# Patient Record
Sex: FEMALE | Race: WHITE | Employment: PART TIME | URBAN - NONMETROPOLITAN AREA
[De-identification: names, ages, dates, MRNs, and addresses within clinical notes are randomized per-mention and may not be internally consistent; named-entity substitution may affect disease eponyms.]

---

## 2019-09-04 ENCOUNTER — APPOINTMENT (OUTPATIENT)
Dept: CT IMAGING | Age: 54
DRG: 424 | End: 2019-09-04
Payer: MEDICAID

## 2019-09-04 ENCOUNTER — HOSPITAL ENCOUNTER (INPATIENT)
Age: 54
LOS: 7 days | Discharge: NURSING, REHAB OR LONG-TERM CARE | DRG: 424 | End: 2019-09-11
Attending: INTERNAL MEDICINE | Admitting: HOSPITALIST
Payer: MEDICAID

## 2019-09-04 ENCOUNTER — APPOINTMENT (OUTPATIENT)
Dept: GENERAL RADIOLOGY | Age: 54
DRG: 424 | End: 2019-09-04
Payer: MEDICAID

## 2019-09-04 DIAGNOSIS — I95.9 HYPOTENSION, UNSPECIFIED HYPOTENSION TYPE: ICD-10-CM

## 2019-09-04 DIAGNOSIS — E87.20 METABOLIC ACIDOSIS: Primary | ICD-10-CM

## 2019-09-04 PROBLEM — G93.41 ACUTE METABOLIC ENCEPHALOPATHY: Status: ACTIVE | Noted: 2019-09-04

## 2019-09-04 LAB
ALBUMIN SERPL-MCNC: 3.5 G/DL (ref 3.5–5.1)
ALLEN TEST: POSITIVE
ALP BLD-CCNC: 90 U/L (ref 38–126)
ALT SERPL-CCNC: 15 U/L (ref 11–66)
ANION GAP SERPL CALCULATED.3IONS-SCNC: 24 MEQ/L (ref 8–16)
AST SERPL-CCNC: 60 U/L (ref 5–40)
BACTERIA: ABNORMAL /HPF
BASE EXCESS (CALCULATED): -13.6 MMOL/L (ref -2.5–2.5)
BASOPHILS # BLD: 0.9 %
BASOPHILS ABSOLUTE: 0.1 THOU/MM3 (ref 0–0.1)
BETA-HYDROXYBUTYRATE: 47.27 MG/DL (ref 0.2–2.81)
BILIRUB SERPL-MCNC: 0.7 MG/DL (ref 0.3–1.2)
BILIRUBIN DIRECT: 0.3 MG/DL (ref 0–0.3)
BILIRUBIN URINE: ABNORMAL
BLOOD, URINE: NEGATIVE
BUN BLDV-MCNC: 26 MG/DL (ref 7–22)
CALCIUM SERPL-MCNC: 8.6 MG/DL (ref 8.5–10.5)
CASTS 2: ABNORMAL /LPF
CASTS UA: ABNORMAL /LPF
CHARACTER, URINE: CLEAR
CHLORIDE BLD-SCNC: 107 MEQ/L (ref 98–111)
CO2: 9 MEQ/L (ref 23–33)
COLLECTED BY:: ABNORMAL
COLOR: ABNORMAL
CREAT SERPL-MCNC: 1.5 MG/DL (ref 0.4–1.2)
CRYSTALS, UA: ABNORMAL
DEVICE: ABNORMAL
EKG ATRIAL RATE: 128 BPM
EKG P AXIS: 73 DEGREES
EKG P-R INTERVAL: 128 MS
EKG Q-T INTERVAL: 380 MS
EKG QRS DURATION: 64 MS
EKG QTC CALCULATION (BAZETT): 554 MS
EKG R AXIS: 89 DEGREES
EKG T AXIS: 79 DEGREES
EKG VENTRICULAR RATE: 128 BPM
EOSINOPHIL # BLD: 0.4 %
EOSINOPHILS ABSOLUTE: 0 THOU/MM3 (ref 0–0.4)
EPITHELIAL CELLS, UA: ABNORMAL /HPF
ERYTHROCYTE [DISTWIDTH] IN BLOOD BY AUTOMATED COUNT: 13.5 % (ref 11.5–14.5)
ERYTHROCYTE [DISTWIDTH] IN BLOOD BY AUTOMATED COUNT: 47.3 FL (ref 35–45)
GFR SERPL CREATININE-BSD FRML MDRD: 36 ML/MIN/1.73M2
GLUCOSE BLD-MCNC: 87 MG/DL (ref 70–108)
GLUCOSE BLD-MCNC: 90 MG/DL (ref 70–108)
GLUCOSE URINE: NEGATIVE MG/DL
HCO3: 11 MMOL/L (ref 23–28)
HCT VFR BLD CALC: 45 % (ref 37–47)
HEMOGLOBIN: 14.9 GM/DL (ref 12–16)
ICTOTEST: NEGATIVE
IMMATURE GRANS (ABS): 0.02 THOU/MM3 (ref 0–0.07)
IMMATURE GRANULOCYTES: 0 %
KETONES, URINE: 40
LACTIC ACID: 1.8 MMOL/L (ref 0.5–2.2)
LEUKOCYTE ESTERASE, URINE: NEGATIVE
LIPASE: 15 U/L (ref 5.6–51.3)
LYMPHOCYTES # BLD: 28.8 %
LYMPHOCYTES ABSOLUTE: 2.6 THOU/MM3 (ref 1–4.8)
MAGNESIUM: 1.8 MG/DL (ref 1.6–2.4)
MCH RBC QN AUTO: 31.2 PG (ref 26–33)
MCHC RBC AUTO-ENTMCNC: 33.1 GM/DL (ref 32.2–35.5)
MCV RBC AUTO: 94.3 FL (ref 81–99)
MISCELLANEOUS 2: ABNORMAL
MONOCYTES # BLD: 9.2 %
MONOCYTES ABSOLUTE: 0.8 THOU/MM3 (ref 0.4–1.3)
NITRITE, URINE: NEGATIVE
NUCLEATED RED BLOOD CELLS: 0 /100 WBC
O2 SATURATION: 98 %
OSMOLALITY CALCULATION: 283.7 MOSMOL/KG (ref 275–300)
PCO2: 21 MMHG (ref 35–45)
PH BLOOD GAS: 7.3 (ref 7.35–7.45)
PH UA: 6 (ref 5–9)
PLATELET # BLD: 376 THOU/MM3 (ref 130–400)
PMV BLD AUTO: 10.7 FL (ref 9.4–12.4)
PO2: 104 MMHG (ref 71–104)
POTASSIUM SERPL-SCNC: 5.2 MEQ/L (ref 3.5–5.2)
PREGNANCY, SERUM: NEGATIVE
PROTEIN UA: 30
RBC # BLD: 4.77 MILL/MM3 (ref 4.2–5.4)
RBC URINE: ABNORMAL /HPF
REASON FOR REJECTION: NORMAL
REJECTED TEST: NORMAL
RENAL EPITHELIAL, UA: ABNORMAL
SEG NEUTROPHILS: 60.5 %
SEGMENTED NEUTROPHILS ABSOLUTE COUNT: 5.6 THOU/MM3 (ref 1.8–7.7)
SODIUM BLD-SCNC: 140 MEQ/L (ref 135–145)
SOURCE, BLOOD GAS: ABNORMAL
SPECIFIC GRAVITY, URINE: 1.02 (ref 1–1.03)
T4 FREE: 0.69 NG/DL (ref 0.93–1.76)
TOTAL CK: 2677 U/L (ref 30–135)
TOTAL PROTEIN: 5.7 G/DL (ref 6.1–8)
TROPONIN T: < 0.01 NG/ML
TSH SERPL DL<=0.05 MIU/L-ACNC: 0.03 UIU/ML (ref 0.4–4.2)
UROBILINOGEN, URINE: 1 EU/DL (ref 0–1)
WBC # BLD: 9.2 THOU/MM3 (ref 4.8–10.8)
WBC UA: ABNORMAL /HPF
YEAST: ABNORMAL

## 2019-09-04 PROCEDURE — 84703 CHORIONIC GONADOTROPIN ASSAY: CPT

## 2019-09-04 PROCEDURE — 71045 X-RAY EXAM CHEST 1 VIEW: CPT

## 2019-09-04 PROCEDURE — 93005 ELECTROCARDIOGRAM TRACING: CPT | Performed by: INTERNAL MEDICINE

## 2019-09-04 PROCEDURE — 82550 ASSAY OF CK (CPK): CPT

## 2019-09-04 PROCEDURE — 87040 BLOOD CULTURE FOR BACTERIA: CPT

## 2019-09-04 PROCEDURE — 80307 DRUG TEST PRSMV CHEM ANLYZR: CPT

## 2019-09-04 PROCEDURE — 83735 ASSAY OF MAGNESIUM: CPT

## 2019-09-04 PROCEDURE — 36600 WITHDRAWAL OF ARTERIAL BLOOD: CPT

## 2019-09-04 PROCEDURE — 2709999900 HC NON-CHARGEABLE SUPPLY

## 2019-09-04 PROCEDURE — 2580000003 HC RX 258: Performed by: INTERNAL MEDICINE

## 2019-09-04 PROCEDURE — 2060000000 HC ICU INTERMEDIATE R&B

## 2019-09-04 PROCEDURE — 84484 ASSAY OF TROPONIN QUANT: CPT

## 2019-09-04 PROCEDURE — 99223 1ST HOSP IP/OBS HIGH 75: CPT | Performed by: HOSPITALIST

## 2019-09-04 PROCEDURE — 2580000003 HC RX 258: Performed by: HOSPITALIST

## 2019-09-04 PROCEDURE — 82803 BLOOD GASES ANY COMBINATION: CPT

## 2019-09-04 PROCEDURE — 84439 ASSAY OF FREE THYROXINE: CPT

## 2019-09-04 PROCEDURE — 83690 ASSAY OF LIPASE: CPT

## 2019-09-04 PROCEDURE — 82948 REAGENT STRIP/BLOOD GLUCOSE: CPT

## 2019-09-04 PROCEDURE — 80053 COMPREHEN METABOLIC PANEL: CPT

## 2019-09-04 PROCEDURE — 84443 ASSAY THYROID STIM HORMONE: CPT

## 2019-09-04 PROCEDURE — 85025 COMPLETE CBC W/AUTO DIFF WBC: CPT

## 2019-09-04 PROCEDURE — 70450 CT HEAD/BRAIN W/O DYE: CPT

## 2019-09-04 PROCEDURE — 82010 KETONE BODYS QUAN: CPT

## 2019-09-04 PROCEDURE — 36415 COLL VENOUS BLD VENIPUNCTURE: CPT

## 2019-09-04 PROCEDURE — 99285 EMERGENCY DEPT VISIT HI MDM: CPT

## 2019-09-04 PROCEDURE — 82248 BILIRUBIN DIRECT: CPT

## 2019-09-04 PROCEDURE — 83605 ASSAY OF LACTIC ACID: CPT

## 2019-09-04 PROCEDURE — 81001 URINALYSIS AUTO W/SCOPE: CPT

## 2019-09-04 RX ORDER — ACETAMINOPHEN 325 MG/1
650 TABLET ORAL EVERY 6 HOURS PRN
Status: DISCONTINUED | OUTPATIENT
Start: 2019-09-04 | End: 2019-09-11 | Stop reason: HOSPADM

## 2019-09-04 RX ORDER — 0.9 % SODIUM CHLORIDE 0.9 %
1000 INTRAVENOUS SOLUTION INTRAVENOUS ONCE
Status: DISCONTINUED | OUTPATIENT
Start: 2019-09-04 | End: 2019-09-11 | Stop reason: HOSPADM

## 2019-09-04 RX ORDER — SODIUM CHLORIDE 9 MG/ML
INJECTION, SOLUTION INTRAVENOUS CONTINUOUS
Status: DISCONTINUED | OUTPATIENT
Start: 2019-09-04 | End: 2019-09-07

## 2019-09-04 RX ORDER — POTASSIUM CHLORIDE 20 MEQ/1
40 TABLET, EXTENDED RELEASE ORAL PRN
Status: DISCONTINUED | OUTPATIENT
Start: 2019-09-04 | End: 2019-09-11 | Stop reason: HOSPADM

## 2019-09-04 RX ORDER — 0.9 % SODIUM CHLORIDE 0.9 %
1000 INTRAVENOUS SOLUTION INTRAVENOUS ONCE
Status: COMPLETED | OUTPATIENT
Start: 2019-09-04 | End: 2019-09-04

## 2019-09-04 RX ORDER — SODIUM CHLORIDE 0.9 % (FLUSH) 0.9 %
10 SYRINGE (ML) INJECTION EVERY 12 HOURS SCHEDULED
Status: DISCONTINUED | OUTPATIENT
Start: 2019-09-04 | End: 2019-09-11 | Stop reason: HOSPADM

## 2019-09-04 RX ORDER — DEXTROSE AND SODIUM CHLORIDE 5; .45 G/100ML; G/100ML
INJECTION, SOLUTION INTRAVENOUS CONTINUOUS
Status: DISCONTINUED | OUTPATIENT
Start: 2019-09-04 | End: 2019-09-07

## 2019-09-04 RX ORDER — SODIUM CHLORIDE 0.9 % (FLUSH) 0.9 %
10 SYRINGE (ML) INJECTION PRN
Status: DISCONTINUED | OUTPATIENT
Start: 2019-09-04 | End: 2019-09-11 | Stop reason: HOSPADM

## 2019-09-04 RX ORDER — POTASSIUM CHLORIDE 7.45 MG/ML
10 INJECTION INTRAVENOUS PRN
Status: DISCONTINUED | OUTPATIENT
Start: 2019-09-04 | End: 2019-09-11 | Stop reason: HOSPADM

## 2019-09-04 RX ORDER — ONDANSETRON 2 MG/ML
4 INJECTION INTRAMUSCULAR; INTRAVENOUS EVERY 6 HOURS PRN
Status: DISCONTINUED | OUTPATIENT
Start: 2019-09-04 | End: 2019-09-11 | Stop reason: HOSPADM

## 2019-09-04 RX ADMIN — SODIUM CHLORIDE 1000 ML: 9 INJECTION, SOLUTION INTRAVENOUS at 16:47

## 2019-09-04 RX ADMIN — DEXTROSE AND SODIUM CHLORIDE: 5; 450 INJECTION, SOLUTION INTRAVENOUS at 23:02

## 2019-09-04 RX ADMIN — SODIUM CHLORIDE 1000 ML: 9 INJECTION, SOLUTION INTRAVENOUS at 19:05

## 2019-09-05 LAB
ALBUMIN SERPL-MCNC: 3.2 G/DL (ref 3.5–5.1)
ALP BLD-CCNC: 82 U/L (ref 38–126)
ALT SERPL-CCNC: 14 U/L (ref 11–66)
AMPHETAMINE+METHAMPHETAMINE URINE SCREEN: NEGATIVE
ANION GAP SERPL CALCULATED.3IONS-SCNC: 18 MEQ/L (ref 8–16)
AST SERPL-CCNC: 54 U/L (ref 5–40)
BARBITURATE QUANTITATIVE URINE: NEGATIVE
BENZODIAZEPINE QUANTITATIVE URINE: NEGATIVE
BILIRUB SERPL-MCNC: 0.6 MG/DL (ref 0.3–1.2)
BUN BLDV-MCNC: 21 MG/DL (ref 7–22)
CALCIUM SERPL-MCNC: 8.6 MG/DL (ref 8.5–10.5)
CANNABINOID QUANTITATIVE URINE: NEGATIVE
CHLORIDE BLD-SCNC: 105 MEQ/L (ref 98–111)
CO2: 16 MEQ/L (ref 23–33)
COCAINE METABOLITE QUANTITATIVE URINE: NEGATIVE
CORTISOL COLLECTION INFO: NORMAL
CORTISOL: 5.18 UG/DL
CREAT SERPL-MCNC: 1.1 MG/DL (ref 0.4–1.2)
ERYTHROCYTE [DISTWIDTH] IN BLOOD BY AUTOMATED COUNT: 13.4 % (ref 11.5–14.5)
ERYTHROCYTE [DISTWIDTH] IN BLOOD BY AUTOMATED COUNT: 46.4 FL (ref 35–45)
FOLLICLE STIMULATING HORMONE: 0.7 MIU/ML (ref 16–160)
GFR SERPL CREATININE-BSD FRML MDRD: 52 ML/MIN/1.73M2
GLUCOSE BLD-MCNC: 148 MG/DL (ref 70–108)
HCT VFR BLD CALC: 35.6 % (ref 37–47)
HEMOGLOBIN: 11.8 GM/DL (ref 12–16)
LACTIC ACID: 1.1 MMOL/L (ref 0.5–2.2)
LUTEINIZING HORMONE: 0.5 MIU/ML (ref 3.3–70.6)
MCH RBC QN AUTO: 31.3 PG (ref 26–33)
MCHC RBC AUTO-ENTMCNC: 33.1 GM/DL (ref 32.2–35.5)
MCV RBC AUTO: 94.4 FL (ref 81–99)
OPIATES, URINE: NEGATIVE
OSMOLALITY URINE: 568 MOSMOL/KG (ref 250–750)
OSMOLALITY: 292 MOSMOL/KG (ref 275–295)
OXYCODONE: NEGATIVE
PHENCYCLIDINE QUANTITATIVE URINE: NEGATIVE
PLATELET # BLD: 301 THOU/MM3 (ref 130–400)
PMV BLD AUTO: 10.1 FL (ref 9.4–12.4)
POTASSIUM REFLEX MAGNESIUM: 4.2 MEQ/L (ref 3.5–5.2)
PROLACTIN: 4.6 NG/ML
RBC # BLD: 3.77 MILL/MM3 (ref 4.2–5.4)
SODIUM BLD-SCNC: 139 MEQ/L (ref 135–145)
TOTAL CK: 2535 U/L (ref 30–135)
TOTAL PROTEIN: 6.2 G/DL (ref 6.1–8)
WBC # BLD: 6.9 THOU/MM3 (ref 4.8–10.8)

## 2019-09-05 PROCEDURE — 84146 ASSAY OF PROLACTIN: CPT

## 2019-09-05 PROCEDURE — 83002 ASSAY OF GONADOTROPIN (LH): CPT

## 2019-09-05 PROCEDURE — 83001 ASSAY OF GONADOTROPIN (FSH): CPT

## 2019-09-05 PROCEDURE — 6360000002 HC RX W HCPCS: Performed by: HOSPITALIST

## 2019-09-05 PROCEDURE — 6370000000 HC RX 637 (ALT 250 FOR IP): Performed by: INTERNAL MEDICINE

## 2019-09-05 PROCEDURE — 2709999900 HC NON-CHARGEABLE SUPPLY

## 2019-09-05 PROCEDURE — 83935 ASSAY OF URINE OSMOLALITY: CPT

## 2019-09-05 PROCEDURE — 83605 ASSAY OF LACTIC ACID: CPT

## 2019-09-05 PROCEDURE — 82550 ASSAY OF CK (CPK): CPT

## 2019-09-05 PROCEDURE — 93010 ELECTROCARDIOGRAM REPORT: CPT | Performed by: INTERNAL MEDICINE

## 2019-09-05 PROCEDURE — 2580000003 HC RX 258: Performed by: HOSPITALIST

## 2019-09-05 PROCEDURE — 82024 ASSAY OF ACTH: CPT

## 2019-09-05 PROCEDURE — 80053 COMPREHEN METABOLIC PANEL: CPT

## 2019-09-05 PROCEDURE — 83930 ASSAY OF BLOOD OSMOLALITY: CPT

## 2019-09-05 PROCEDURE — 99233 SBSQ HOSP IP/OBS HIGH 50: CPT | Performed by: INTERNAL MEDICINE

## 2019-09-05 PROCEDURE — 85027 COMPLETE CBC AUTOMATED: CPT

## 2019-09-05 PROCEDURE — 82533 TOTAL CORTISOL: CPT

## 2019-09-05 PROCEDURE — 2060000000 HC ICU INTERMEDIATE R&B

## 2019-09-05 PROCEDURE — 36415 COLL VENOUS BLD VENIPUNCTURE: CPT

## 2019-09-05 PROCEDURE — 82552 ASSAY OF CPK IN BLOOD: CPT

## 2019-09-05 PROCEDURE — 6370000000 HC RX 637 (ALT 250 FOR IP): Performed by: HOSPITALIST

## 2019-09-05 RX ORDER — LEVOTHYROXINE SODIUM 0.1 MG/1
200 TABLET ORAL DAILY
Status: COMPLETED | OUTPATIENT
Start: 2019-09-05 | End: 2019-09-08

## 2019-09-05 RX ORDER — LEVOTHYROXINE SODIUM 0.05 MG/1
50 TABLET ORAL DAILY
Status: DISCONTINUED | OUTPATIENT
Start: 2019-09-05 | End: 2019-09-05

## 2019-09-05 RX ADMIN — LEVOTHYROXINE SODIUM 50 MCG: 50 TABLET ORAL at 10:40

## 2019-09-05 RX ADMIN — LEVOTHYROXINE SODIUM 200 MCG: 100 TABLET ORAL at 20:47

## 2019-09-05 RX ADMIN — DEXTROSE AND SODIUM CHLORIDE: 5; 450 INJECTION, SOLUTION INTRAVENOUS at 21:12

## 2019-09-05 RX ADMIN — ENOXAPARIN SODIUM 40 MG: 40 INJECTION SUBCUTANEOUS at 10:36

## 2019-09-05 RX ADMIN — ACETAMINOPHEN 650 MG: 325 TABLET ORAL at 21:20

## 2019-09-05 RX ADMIN — DEXTROSE AND SODIUM CHLORIDE: 5; 450 INJECTION, SOLUTION INTRAVENOUS at 10:37

## 2019-09-05 NOTE — PROGRESS NOTES
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 CT of the head has reported being as normal however will order an MRI of the brain with contrast, with particular interest in the pituitary area. -We will add levothyroxine oral for now and discuss with endocrinologist if IV is needed. Discussed this with the pharmacist    Acute metabolic acidosis likely secondary to starvation ketosis and mild rhabdomyolysis-improving with IV fluid continue to monitor CK      Thank you No primary care provider on file. for the opportunity to be involved in this patient's care.     Electronically signed by Leonora Arredondo MD on 9/5/2019 at 6:26 PM

## 2019-09-05 NOTE — ED NOTES
Pt resting in bed. Call light in reach. Respirations regular.  VS reassessed     Faith Muhammad RN  09/04/19 1959 Aminah Paredes RN  09/04/19 4090

## 2019-09-05 NOTE — H&P
recognition technology. **      Final report electronically signed by Dr Juan Manuel Hernandez on 9/4/2019 5:10 PM           DVT prophylaxis: Lovenox    Code Status: No Order      Austen Hyman    Diagnosis Date Noted    Metabolic acidosis [K78.3] 09/04/2019     Priority: High    Acute metabolic encephalopathy [G53.05] 09/04/2019       PLAN:    1. Acute metabolic encephalopathy - unknown cause. Limited history due to mental status. Continue supportive care. 2. Metabolic acidosis - patient denies history of DM. Glucose level normal but ketone level is high. Possible starvation ketoacidosis. Continue with D5 1/2 NS. Reassess in AM.        Thank you No primary care provider on file. for the opportunity to be involved in this patient's care.     Electronically signed by Renee Mclean DO on 9/4/2019 at 11:26 PM

## 2019-09-06 ENCOUNTER — APPOINTMENT (OUTPATIENT)
Dept: MRI IMAGING | Age: 54
DRG: 424 | End: 2019-09-06
Payer: MEDICAID

## 2019-09-06 LAB
ANION GAP SERPL CALCULATED.3IONS-SCNC: 11 MEQ/L (ref 8–16)
BUN BLDV-MCNC: 8 MG/DL (ref 7–22)
CALCIUM SERPL-MCNC: 8.5 MG/DL (ref 8.5–10.5)
CHLORIDE BLD-SCNC: 108 MEQ/L (ref 98–111)
CO2: 20 MEQ/L (ref 23–33)
CREAT SERPL-MCNC: 0.7 MG/DL (ref 0.4–1.2)
ESTRADIOL LEVEL: 24.1 PG/ML
GFR SERPL CREATININE-BSD FRML MDRD: 87 ML/MIN/1.73M2
GLUCOSE BLD-MCNC: 136 MG/DL (ref 70–108)
POTASSIUM SERPL-SCNC: 3.6 MEQ/L (ref 3.5–5.2)
SODIUM BLD-SCNC: 139 MEQ/L (ref 135–145)
TOTAL CK: 1037 U/L (ref 30–135)

## 2019-09-06 PROCEDURE — 99232 SBSQ HOSP IP/OBS MODERATE 35: CPT | Performed by: INTERNAL MEDICINE

## 2019-09-06 PROCEDURE — 2060000000 HC ICU INTERMEDIATE R&B

## 2019-09-06 PROCEDURE — 2709999900 HC NON-CHARGEABLE SUPPLY

## 2019-09-06 PROCEDURE — 6360000004 HC RX CONTRAST MEDICATION: Performed by: INTERNAL MEDICINE

## 2019-09-06 PROCEDURE — 82550 ASSAY OF CK (CPK): CPT

## 2019-09-06 PROCEDURE — 82670 ASSAY OF TOTAL ESTRADIOL: CPT

## 2019-09-06 PROCEDURE — 6370000000 HC RX 637 (ALT 250 FOR IP): Performed by: INTERNAL MEDICINE

## 2019-09-06 PROCEDURE — 36415 COLL VENOUS BLD VENIPUNCTURE: CPT

## 2019-09-06 PROCEDURE — 2580000003 HC RX 258: Performed by: HOSPITALIST

## 2019-09-06 PROCEDURE — 6370000000 HC RX 637 (ALT 250 FOR IP): Performed by: HOSPITALIST

## 2019-09-06 PROCEDURE — 80048 BASIC METABOLIC PNL TOTAL CA: CPT

## 2019-09-06 PROCEDURE — 6360000002 HC RX W HCPCS: Performed by: HOSPITALIST

## 2019-09-06 PROCEDURE — 70553 MRI BRAIN STEM W/O & W/DYE: CPT

## 2019-09-06 PROCEDURE — A9579 GAD-BASE MR CONTRAST NOS,1ML: HCPCS | Performed by: INTERNAL MEDICINE

## 2019-09-06 RX ADMIN — GADOTERIDOL 10 ML: 279.3 INJECTION, SOLUTION INTRAVENOUS at 13:42

## 2019-09-06 RX ADMIN — DEXTROSE AND SODIUM CHLORIDE: 5; 450 INJECTION, SOLUTION INTRAVENOUS at 06:44

## 2019-09-06 RX ADMIN — LEVOTHYROXINE SODIUM 200 MCG: 100 TABLET ORAL at 06:44

## 2019-09-06 RX ADMIN — ENOXAPARIN SODIUM 40 MG: 40 INJECTION SUBCUTANEOUS at 10:08

## 2019-09-06 RX ADMIN — ACETAMINOPHEN 650 MG: 325 TABLET ORAL at 23:37

## 2019-09-06 RX ADMIN — DEXTROSE AND SODIUM CHLORIDE: 5; 450 INJECTION, SOLUTION INTRAVENOUS at 19:40

## 2019-09-06 ASSESSMENT — PAIN DESCRIPTION - ONSET
ONSET: GRADUAL
ONSET: GRADUAL

## 2019-09-06 ASSESSMENT — PAIN DESCRIPTION - FREQUENCY
FREQUENCY: INTERMITTENT
FREQUENCY: CONTINUOUS

## 2019-09-06 ASSESSMENT — PAIN DESCRIPTION - ORIENTATION
ORIENTATION: RIGHT
ORIENTATION: ANTERIOR
ORIENTATION: RIGHT
ORIENTATION: RIGHT

## 2019-09-06 ASSESSMENT — PAIN DESCRIPTION - PAIN TYPE
TYPE: ACUTE PAIN

## 2019-09-06 ASSESSMENT — PAIN DESCRIPTION - LOCATION
LOCATION: HEAD
LOCATION: NECK
LOCATION: LEG
LOCATION: LEG

## 2019-09-06 ASSESSMENT — PAIN SCALES - GENERAL
PAINLEVEL_OUTOF10: 0
PAINLEVEL_OUTOF10: 0
PAINLEVEL_OUTOF10: 4
PAINLEVEL_OUTOF10: 10
PAINLEVEL_OUTOF10: 0
PAINLEVEL_OUTOF10: 1
PAINLEVEL_OUTOF10: 8
PAINLEVEL_OUTOF10: 0

## 2019-09-06 ASSESSMENT — PAIN DESCRIPTION - DESCRIPTORS
DESCRIPTORS: ACHING;HEADACHE
DESCRIPTORS: DULL;ACHING;THROBBING
DESCRIPTORS: ACHING;DULL
DESCRIPTORS: DULL

## 2019-09-06 ASSESSMENT — PAIN - FUNCTIONAL ASSESSMENT: PAIN_FUNCTIONAL_ASSESSMENT: ACTIVITIES ARE NOT PREVENTED

## 2019-09-06 NOTE — PROGRESS NOTES
Spoke with pt's daughter Jimena via telephone. She would like the pt at a nursing home as she feels that pt doesn't take good care of herself. She even said she would like the pt to get Medicaid and possibly move her down to [place] with her.

## 2019-09-06 NOTE — PROGRESS NOTES
Ht 5' 2\" (1.575 m)   Wt 133 lb (60.3 kg)   SpO2 97%   BMI 24.33 kg/m²     General appearance:  No apparent distress, appears stated age and cooperative.,gives a blank stare when questions are asked  HEENT:  Dry oral mucosa. Poor dentition. Neck: No jugular venous distention. Trachea midline. Respiratory:  Normal respiratory effort. Clear to auscultation, bilaterally without Rales/Wheezes/Rhonchi. Cardiovascular:  Regular rate and rhythm with normal S1/S2 without murmurs, rubs or gallops. Abdomen: Soft, non-tender, non-distended with normal bowel sounds. Musculoskeletal:  No clubbing, cyanosis or edema bilaterally. Skin: Dry and warm. Neurologic:  Limited exam.  No obvious deficit noted. Psychiatric:  Alert and oriented to self. Capillary Refill: Brisk,< 3 seconds   Peripheral Pulses: +2 palpable, equal bilaterally       Labs:     Recent Labs     09/04/19  1635 09/05/19  0408   WBC 9.2 6.9   HGB 14.9 11.8*   HCT 45.0 35.6*    301     Recent Labs     09/04/19  1709 09/05/19  0408 09/06/19  0350    139 139   K 5.2 4.2 3.6    105 108   CO2 9* 16* 20*   BUN 26* 21 8   CREATININE 1.5* 1.1 0.7   CALCIUM 8.6 8.6 8.5     Recent Labs     09/04/19  1709 09/05/19  0408   AST 60* 54*   ALT 15 14   BILIDIR 0.3  --    BILITOT 0.7 0.6   ALKPHOS 90 82     No results for input(s): INR in the last 72 hours. Recent Labs     09/04/19  1709 09/05/19  0408 09/06/19  0350   CKTOTAL 2,677* 2,535* 1,037*       Urinalysis:      Lab Results   Component Value Date    NITRU NEGATIVE 09/04/2019    WBCUA 0-2 09/04/2019    BACTERIA NONE 09/04/2019    RBCUA 0-2 09/04/2019    BLOODU NEGATIVE 09/04/2019    GLUCOSEU NEGATIVE 09/04/2019       Intake & Output:  I/O last 3 completed shifts: In: 2352.9 [P.O.:200; I.V.:2152.9]  Out: 650 [Urine:650]  No intake/output data recorded.       Radiology:            DVT prophylaxis: Lovenox    Code Status: Full Code      Disposition:Home    [facility]

## 2019-09-06 NOTE — PROGRESS NOTES
Pt. Alert and Oriented x4. Speech slurred. Pupils equal and round 4mm. Left eye reactive to light 2mm. Mucous membranes pink, dry and intact. Heart sounds regular, S1 and S2 heard. Lung sounds regular and equal throughout. Upper extremities appropriate for ethnicity, warm and dry. Peripheral IV in right antecubital infusing. Dressing clear, dry and intact. Hand grasp weak bilaterally. Capillary refill and skin turgor less than 3 seconds. Bowel sounds present and active in all four quadrants, no guarding or tenderness. Talavera catheter in place. Redness on bottom. Lower extremities appropriate for ethnicity, warm and dry. Pedal push and pull weak bilaterally. Pedal pulses 2+ bilaterally. Call light and bedside table within reach.    Maru Salas SN/RSC

## 2019-09-06 NOTE — PROGRESS NOTES
Once    enoxaparin  40 mg Subcutaneous Daily     Continuous Infusions:   sodium chloride      dextrose 5 % and 0.45 % NaCl 100 mL/hr at 09/06/19 0644     PRN Meds:sodium chloride flush, magnesium hydroxide, ondansetron, magnesium sulfate, potassium chloride **OR** potassium alternative oral replacement **OR** potassium chloride, acetaminophen    OBJECTIVE        Physical    VITALS:  /63   Pulse 88   Temp 98.5 °F (36.9 °C) (Oral)   Resp 16   Ht 5' 2\" (1.575 m)   Wt 133 lb (60.3 kg)   SpO2 97%   BMI 24.33 kg/m²   CONSTITUTIONAL:  awake, alert, cooperative, no apparent distress, and appears stated age  CARDIOVASCULAR:  Normal apical impulse, regular rate and rhythm, normal S1 and S2, no S3 or S4, and no murmur noted  ABDOMEN:  No scars, normal bowel sounds, soft, non-distended, non-tender, no masses palpated, no hepatosplenomegally  CHEST/BREASTS:  Breasts symmetrical, skin without lesion(s), no nipple retraction or dimpling, no nipple discharge, no masses palpated, no axillary or supraclavicular adenopathy    DATA  TSH:    Lab Results   Component Value Date    TSH 0.032 09/04/2019   No components found for: FREE T4No components found for: FREET3  RADIOLOGYREPORTS:    Lab Review  @LASTGLUCOSEx3@  [unfilled]  Lab Results   Component Value Date    TSH 0.032 (L) 09/04/2019    T4FREE 0.69 (L) 09/04/2019     No results found for: FREET4  No results found for: TESTOSTERONE  CBC:  Lab Results   Component Value Date    WBC 6.9 09/05/2019    RBC 3.77 09/05/2019    HGB 11.8 09/05/2019    HCT 35.6 09/05/2019    MCV 94.4 09/05/2019    MCH 31.3 09/05/2019    MCHC 33.1 09/05/2019     09/05/2019    MPV 10.1 09/05/2019     CMP:    Lab Results   Component Value Date     09/06/2019    K 3.6 09/06/2019    K 4.2 09/05/2019     09/06/2019    CO2 20 09/06/2019    BUN 8 09/06/2019    CREATININE 0.7 09/06/2019    LABGLOM 87 09/06/2019    GLUCOSE 136 09/06/2019    PROT 6.2 09/05/2019    LABALBU 3.2 09/05/2019    CALCIUM 8.5 09/06/2019    BILITOT 0.6 09/05/2019    ALKPHOS 82 09/05/2019    AST 54 09/05/2019    ALT 14 09/05/2019     Hepatic Function Panel:    Lab Results   Component Value Date    ALKPHOS 82 09/05/2019    ALT 14 09/05/2019    AST 54 09/05/2019    PROT 6.2 09/05/2019    BILITOT 0.6 09/05/2019    BILIDIR 0.3 09/04/2019    LABALBU 3.2 09/05/2019     Magnesium:    Lab Results   Component Value Date    MG 1.8 09/04/2019     PT/INR:  No results found for: PROTIME, INR  HgBA1c:  No results found for: LABA1C   Recent Labs     09/04/19  1709 09/05/19  0408 09/06/19  0350   CKTOTAL 2,677* 2,535* 1,037*     PROCEDURE: MRI BRAIN W WO CONTRAST    CLINICAL INFORMATION: central hypothyroidism. COMPARISON: None available. Correlation is made to CT of the head dated September 4, 2019. TECHNIQUE: Multiplanar and multiple spin echo T1 and T2-weighted images were obtained through the brain before and after the administration of 10 mL ProHance intravenous contrast. Additional high-resolution sequences were performed through the pituitary   gland. These include coronal T2-weighted images and coronal and sagittal pre-and postcontrast T1-weighted images. Note is made that images were repeated secondary to patient motion. The repeated images are also degraded by motion. FINDINGS:  The images are degraded by motion artifacts which limits detailed evaluation. There is prominence of the sulcal spaces and ventricular system secondary to generalized, age-related parenchymal volume loss. Mild punctate foci of hyperintense T2 signal are noted within the periventricular and deep cerebral white matter which likely   correspond to sequela of chronic microvascular ischemic change. Encephalomalacia is noted at the inferior medial aspect of the right frontal lobe. This may be secondary to postsurgical changes and there are postsurgical changes at the sellar region and   suprasellar cistern.  No restricted diffusion is

## 2019-09-07 LAB
ANION GAP SERPL CALCULATED.3IONS-SCNC: 10 MEQ/L (ref 8–16)
BUN BLDV-MCNC: 4 MG/DL (ref 7–22)
CALCIUM SERPL-MCNC: 8 MG/DL (ref 8.5–10.5)
CHLORIDE BLD-SCNC: 106 MEQ/L (ref 98–111)
CO2: 20 MEQ/L (ref 23–33)
CORTISOL COLLECTION INFO: NORMAL
CORTISOL: 1.37 UG/DL
CORTISOL: 5.93 UG/DL
CORTISOL: 8.48 UG/DL
CORTISOL: NORMAL UG/DL
CREAT SERPL-MCNC: 0.6 MG/DL (ref 0.4–1.2)
GFR SERPL CREATININE-BSD FRML MDRD: > 90 ML/MIN/1.73M2
GLUCOSE BLD-MCNC: 100 MG/DL (ref 70–108)
POTASSIUM SERPL-SCNC: 2.9 MEQ/L (ref 3.5–5.2)
POTASSIUM SERPL-SCNC: 3.6 MEQ/L (ref 3.5–5.2)
POTASSIUM SERPL-SCNC: 5.8 MEQ/L (ref 3.5–5.2)
SODIUM BLD-SCNC: 136 MEQ/L (ref 135–145)
TOTAL CK: 444 U/L (ref 30–135)
VITAMIN D 25-HYDROXY: 16 NG/ML (ref 30–100)

## 2019-09-07 PROCEDURE — 99232 SBSQ HOSP IP/OBS MODERATE 35: CPT | Performed by: INTERNAL MEDICINE

## 2019-09-07 PROCEDURE — 84132 ASSAY OF SERUM POTASSIUM: CPT

## 2019-09-07 PROCEDURE — 36415 COLL VENOUS BLD VENIPUNCTURE: CPT

## 2019-09-07 PROCEDURE — 80400 ACTH STIMULATION PANEL: CPT

## 2019-09-07 PROCEDURE — 6360000002 HC RX W HCPCS: Performed by: HOSPITALIST

## 2019-09-07 PROCEDURE — 2580000003 HC RX 258: Performed by: HOSPITALIST

## 2019-09-07 PROCEDURE — 2709999900 HC NON-CHARGEABLE SUPPLY

## 2019-09-07 PROCEDURE — 97530 THERAPEUTIC ACTIVITIES: CPT

## 2019-09-07 PROCEDURE — 82550 ASSAY OF CK (CPK): CPT

## 2019-09-07 PROCEDURE — 82306 VITAMIN D 25 HYDROXY: CPT

## 2019-09-07 PROCEDURE — 82533 TOTAL CORTISOL: CPT

## 2019-09-07 PROCEDURE — 97163 PT EVAL HIGH COMPLEX 45 MIN: CPT

## 2019-09-07 PROCEDURE — 2060000000 HC ICU INTERMEDIATE R&B

## 2019-09-07 PROCEDURE — 80048 BASIC METABOLIC PNL TOTAL CA: CPT

## 2019-09-07 PROCEDURE — 6370000000 HC RX 637 (ALT 250 FOR IP): Performed by: INTERNAL MEDICINE

## 2019-09-07 PROCEDURE — 6360000002 HC RX W HCPCS: Performed by: INTERNAL MEDICINE

## 2019-09-07 RX ORDER — FLUDROCORTISONE ACETATE 0.1 MG/1
100 TABLET ORAL 2 TIMES DAILY
Status: DISCONTINUED | OUTPATIENT
Start: 2019-09-07 | End: 2019-09-11 | Stop reason: HOSPADM

## 2019-09-07 RX ORDER — MEDROXYPROGESTERONE ACETATE 2.5 MG/1
2.5 TABLET ORAL DAILY
Status: DISCONTINUED | OUTPATIENT
Start: 2019-09-07 | End: 2019-09-11 | Stop reason: HOSPADM

## 2019-09-07 RX ORDER — ESTRADIOL 1 MG/1
1 TABLET ORAL DAILY
Status: DISCONTINUED | OUTPATIENT
Start: 2019-09-07 | End: 2019-09-11 | Stop reason: HOSPADM

## 2019-09-07 RX ORDER — COSYNTROPIN 0.25 MG/ML
0.25 INJECTION, POWDER, FOR SOLUTION INTRAMUSCULAR; INTRAVENOUS ONCE
Status: DISCONTINUED | OUTPATIENT
Start: 2019-09-07 | End: 2019-09-07 | Stop reason: SDUPTHER

## 2019-09-07 RX ORDER — ERGOCALCIFEROL 1.25 MG/1
50000 CAPSULE ORAL
Status: DISCONTINUED | OUTPATIENT
Start: 2019-09-07 | End: 2019-09-11 | Stop reason: HOSPADM

## 2019-09-07 RX ORDER — COSYNTROPIN 0.25 MG/ML
250 INJECTION, POWDER, FOR SOLUTION INTRAMUSCULAR; INTRAVENOUS ONCE
Status: COMPLETED | OUTPATIENT
Start: 2019-09-07 | End: 2019-09-07

## 2019-09-07 RX ORDER — POTASSIUM CHLORIDE 7.45 MG/ML
10 INJECTION INTRAVENOUS
Status: DISPENSED | OUTPATIENT
Start: 2019-09-07 | End: 2019-09-07

## 2019-09-07 RX ADMIN — ERGOCALCIFEROL 50000 UNITS: 1.25 CAPSULE ORAL at 21:27

## 2019-09-07 RX ADMIN — POTASSIUM CHLORIDE 10 MEQ: 7.46 INJECTION, SOLUTION INTRAVENOUS at 08:35

## 2019-09-07 RX ADMIN — MEDROXYPROGESTERONE ACETATE 2.5 MG: 2.5 TABLET ORAL at 10:28

## 2019-09-07 RX ADMIN — HYDROCORTISONE SODIUM SUCCINATE 100 MG: 100 INJECTION, POWDER, FOR SOLUTION INTRAMUSCULAR; INTRAVENOUS at 13:31

## 2019-09-07 RX ADMIN — COSYNTROPIN 250 MCG: 0.25 INJECTION, POWDER, LYOPHILIZED, FOR SOLUTION INTRAMUSCULAR; INTRAVENOUS at 10:18

## 2019-09-07 RX ADMIN — POTASSIUM CHLORIDE 10 MEQ: 7.46 INJECTION, SOLUTION INTRAVENOUS at 11:32

## 2019-09-07 RX ADMIN — FLUDROCORTISONE ACETATE 100 MCG: 0.1 TABLET ORAL at 21:27

## 2019-09-07 RX ADMIN — HYDROCORTISONE SODIUM SUCCINATE 100 MG: 100 INJECTION, POWDER, FOR SOLUTION INTRAMUSCULAR; INTRAVENOUS at 21:28

## 2019-09-07 RX ADMIN — POTASSIUM CHLORIDE 10 MEQ: 7.46 INJECTION, SOLUTION INTRAVENOUS at 09:46

## 2019-09-07 RX ADMIN — DEXTROSE AND SODIUM CHLORIDE: 5; 450 INJECTION, SOLUTION INTRAVENOUS at 05:36

## 2019-09-07 RX ADMIN — ESTRADIOL 1 MG: 1 TABLET ORAL at 10:28

## 2019-09-07 RX ADMIN — LEVOTHYROXINE SODIUM 200 MCG: 100 TABLET ORAL at 05:33

## 2019-09-07 RX ADMIN — ENOXAPARIN SODIUM 40 MG: 40 INJECTION SUBCUTANEOUS at 09:53

## 2019-09-07 RX ADMIN — POTASSIUM CHLORIDE 10 MEQ: 7.46 INJECTION, SOLUTION INTRAVENOUS at 05:34

## 2019-09-07 RX ADMIN — POTASSIUM CHLORIDE 10 MEQ: 7.46 INJECTION, SOLUTION INTRAVENOUS at 17:24

## 2019-09-07 RX ADMIN — Medication 10 ML: at 21:25

## 2019-09-07 RX ADMIN — Medication 10 ML: at 10:34

## 2019-09-07 ASSESSMENT — PAIN SCALES - GENERAL
PAINLEVEL_OUTOF10: 0
PAINLEVEL_OUTOF10: 2

## 2019-09-07 ASSESSMENT — PAIN DESCRIPTION - ONSET: ONSET: GRADUAL

## 2019-09-07 ASSESSMENT — PAIN DESCRIPTION - PROGRESSION: CLINICAL_PROGRESSION: GRADUALLY WORSENING

## 2019-09-07 ASSESSMENT — PAIN DESCRIPTION - FREQUENCY: FREQUENCY: INTERMITTENT

## 2019-09-07 ASSESSMENT — PAIN DESCRIPTION - LOCATION: LOCATION: HEAD

## 2019-09-07 ASSESSMENT — PAIN - FUNCTIONAL ASSESSMENT: PAIN_FUNCTIONAL_ASSESSMENT: ACTIVITIES ARE NOT PREVENTED

## 2019-09-07 ASSESSMENT — PAIN DESCRIPTION - PAIN TYPE: TYPE: ACUTE PAIN

## 2019-09-07 ASSESSMENT — PAIN DESCRIPTION - DESCRIPTORS: DESCRIPTORS: HEADACHE

## 2019-09-07 NOTE — PROGRESS NOTES
Received bed side report from primary nurse, Dorann Alpers. Patient resting in bed with eyes closed.      Niurka Jimenez [facility]-SN

## 2019-09-07 NOTE — PROGRESS NOTES
Reported off to primary nurse, Dee Dee Almendarez. 1400 dose of potassium is still infusing. Patient is resting in bed.      Paul Friends RSC-SN

## 2019-09-07 NOTE — PROGRESS NOTES
Beth Christy 61  OCCUPATIONAL THERAPY MISSED TREATMENT NOTE  [facility] NEUROSCIENCES 4A  [id number]      Date: 2019  Patient Name: Elayne Gillespie        CSN: [id number]   : 1965  (49 y.o.)  Gender: female                REASON FOR MISSED TREATMENT: Pt declined participation this pm d/t fatigue. Detailed exam

## 2019-09-07 NOTE — PROGRESS NOTES
Patient's BP reassessed, 85/65. Lab chey second cortisol level. Patient resting in bed.     Blase Holding RSC-SN

## 2019-09-07 NOTE — PROGRESS NOTES
Pt. resting comfortably in bed with eyes closed. Call light and bedside table within reach. Reported of to Alexa Lopes . KYLE Saucedo SN/RSC

## 2019-09-07 NOTE — PROGRESS NOTES
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 [address] and LH along with TSH and free T4 is low estradiol started -by endocrinologist on 9/6 .levothyroxine added by endocrinologist  Received a one-time dose of cosyntropin 9/7-    Solu-Cortef added IV by endocrinologist 9/7    Acute metabolic acidosis likely secondary to starvation ketosis and mild rhabdomyolysis-improving with IV fluid continue to monitor [id number] to monitor      PT OT plan for rehab  Thank you No primary care provider on file. for the opportunity to be involved in this patient's care.     Electronically signed by Mellisa Nicholas MD on 9/7/2019 at 5:56 PM

## 2019-09-07 NOTE — PLAN OF CARE
participates minimally in self care - continue to encourage and assist pt.  9/7/2019 0055 by Pawel Singh RN  Outcome: Ongoing  Note:   Pt starting to help with turning and repositioning     Problem: Discharge Planning:  Goal: Discharged to appropriate level of care  Description  Discharged to appropriate level of care  9/7/2019 1401 by Christiano Martinez RN  Outcome: Ongoing  Note:   Per daughter, Ari Santana in [place] - relative pt. Was staying with can no longer take care of pt, as she has been requiring quite a lot of care - will ensure SS is on case and discusses discharge with pt./family. 9/7/2019 0055 by Pawel Singh RN  Outcome: Ongoing  Note:   Pt possibly looking to go to nursing home at discharge     Problem: Discharge Planning:  Goal: Ability to perform activities of daily living will improve  Description  Ability to perform activities of daily living will improve  9/7/2019 1401 by Christiano Martinez RN  Outcome: Not Met This Shift  Note:   Pt. Is unable to perform many of her daily activities - continue to assist and encourage pt. 9/7/2019 0055 by Pawel Singh RN  Outcome: Ongoing  Note:   Pt does not have much energy and needs assistance with ADLs     Problem: Discharge Planning:  Goal: Participates in care planning  Description  Participates in care planning  9/7/2019 1401 by Christiano Martinez RN  Outcome: Ongoing  Note:   Pt. Isn't able to participate in care much today - continue to teach and discuss POC  Problem: Risk for Impaired Skin Integrity  Goal: Tissue integrity - skin and mucous membranes  Description  Structural intactness and normal physiological function of skin and  mucous membranes. 9/7/2019 1401 by Christiano Martinez RN  Outcome: Ongoing  Note:   Pt. Remains at risk and all measures taken to ensure no skin breakdown.   Problem: Injury - Risk of, Physical Injury:  Goal: Will remain free from falls  Description  Will remain free from falls  9/7/2019 1401 by Christiano Martinez RN  Outcome: Met This

## 2019-09-07 NOTE — PLAN OF CARE
Problem: Falls - Risk of:  Goal: Will remain free from falls  Description  Will remain free from falls  Outcome: Ongoing  Note:   No falls this shift. Pt using call light appropriately to call for assistance with ambulation to the bathroom and to chair. Pt is also compliant with use of non-skid slippers. Pt reports understanding of fall prevention when discussed. Problem: Falls - Risk of:  Goal: Absence of physical injury  Description  Absence of physical injury  Outcome: Ongoing  Note:   Pt free from physical injury this shift. Pt states that she understands how to use the call light     Problem: Skin Integrity:  Goal: Will show no infection signs and symptoms  Description  Will show no infection signs and symptoms  Outcome: Ongoing  Note:   Pt running low grade temps at times     Problem: Skin Integrity:  Goal: Absence of new skin breakdown  Description  Absence of new skin breakdown  Outcome: Ongoing  Note:   No new skin breakdown noted. Pt will turn and reposition with strong encouragement     Problem: Coping:  Goal: Ability to remain calm will improve  Description  Ability to remain calm will improve  Outcome: Ongoing  Note:   Pt has been calm and cooperative this shift     Problem: Safety:  Goal: Ability to remain free from injury will improve  Description  Ability to remain free from injury will improve  Outcome: Ongoing  Note:   Pt has been safe and free from injury this shift.       Problem: Self-Care:  Goal: Ability to participate in self-care as condition permits will improve  Description  Ability to participate in self-care as condition permits will improve  Outcome: Ongoing  Note:   Pt starting to help with turning and repositioning     Problem: Discharge Planning:  Goal: Discharged to appropriate level of care  Description  Discharged to appropriate level of care  Outcome: Ongoing  Note:   Pt possibly looking to go to nursing home at discharge     Problem: Discharge Planning:  Goal: Ability to perform activities of daily living will improve  Description  Ability to perform activities of daily living will improve  Outcome: Ongoing  Note:   Pt does not have much energy and needs assistance with ADLs     Problem: Discharge Planning:  Goal: Participates in care planning  Description  Participates in care planning  Outcome: Ongoing  Note:   Pt does not participate much in care planning     Problem: Risk for Impaired Skin Integrity  Goal: Tissue integrity - skin and mucous membranes  Description  Structural intactness and normal physiological function of skin and  mucous membranes. Outcome: Ongoing  Note:   No new skin breakdown noted. Pt encouraged to turn and reposition every 2 hours     Problem: Confusion - Acute:  Goal: Absence of continued neurological deterioration signs and symptoms  Description  Absence of continued neurological deterioration signs and symptoms  Outcome: Ongoing  Note:   Pt alert to name always. Disoriented to time, place and situation at times     Problem: Confusion - Acute:  Goal: Mental status will be restored to baseline  Description  Mental status will be restored to baseline  Outcome: Ongoing  Note:   Pt is alert to name. Disoriented to time, place, and situation     Problem: Injury - Risk of, Physical Injury:  Goal: Will remain free from falls  Description  Will remain free from falls  Outcome: Ongoing  Note:   No falls this shift. Pt using call light appropriately to call for assistance with ambulation to the bathroom and to chair. Pt is also compliant with use of non-skid slippers. Pt reports understanding of fall prevention when discussed. Problem: Injury - Risk of, Physical Injury:  Goal: Absence of physical injury  Description  Absence of physical injury  Outcome: Ongoing  Note:   Pt free from physical injury this shift.  Pt states that she understands how to use the call light     Problem: Mood - Altered:  Goal: Mood stable  Description  Mood stable  Outcome:

## 2019-09-07 NOTE — PROGRESS NOTES
Oral Q6H PRN Latisha Montaño DO   650 mg at 09/06/19 2337     Home Meds:   Prior to Admission medications    Medication Sig Start Date End Date Taking?  Authorizing Provider   Estradiol-Progesterone 1-100 MG CAPS Take 1 capsule by mouth daily 9/7/19  Yes Florentino Barnard MD     Scheduled Meds:   potassium chloride  10 mEq Intravenous Q2H    medroxyPROGESTERone  2.5 mg Oral Daily    estradiol  1 mg Oral Daily    hydrocortisone sodium succinate PF  100 mg Intravenous Q8H    levothyroxine  200 mcg Oral Daily    sodium chloride flush  10 mL Intravenous 2 times per day    sodium chloride  1,000 mL Intravenous Once    enoxaparin  40 mg Subcutaneous Daily     Continuous Infusions:  PRN Meds:sodium chloride flush, magnesium hydroxide, ondansetron, magnesium sulfate, potassium chloride **OR** potassium alternative oral replacement **OR** potassium chloride, acetaminophen    OBJECTIVE        Physical    VITALS:  /62   Pulse 78   Temp 98.1 °F (36.7 °C) (Oral)   Resp 16   Ht 5' 2\" (1.575 m)   Wt 138 lb 9.6 oz (62.9 kg)   SpO2 98%   BMI 25.35 kg/m²   CONSTITUTIONAL:  awake, alert, cooperative, no apparent distress, and appears stated age  LUNGS:  No increased work of breathing, good air exchange, clear to auscultation bilaterally, no crackles or wheezing  CARDIOVASCULAR:  Normal apical impulse, regular rate and rhythm, normal S1 and S2, no S3 or S4, and no murmur noted  ABDOMEN:  No scars, normal bowel sounds, soft, non-distended, non-tender, no masses palpated, no hepatosplenomegally    DATA  TSH:    Lab Results   Component Value Date    TSH 0.032 09/04/2019   No components found for: FREE T4No components found for: FREET3  RADIOLOGYREPORTS:    Lab Review  @LASTGLUCOSEx3@  [unfilled]  Lab Results   Component Value Date    TSH 0.032 (L) 09/04/2019    T4FREE 0.69 (L) 09/04/2019     No results found for: FREET4  No results found for: TESTOSTERONE  CBC:  Lab Results   Component Value Date    WBC 6.9

## 2019-09-07 NOTE — PROGRESS NOTES
Second head-to-toe complete. Check flowsheet. Patient refused bath or washup. Patient resting comfortably in bed, with call liht and bedside table within reach.    New Site Minks SN/RSC

## 2019-09-08 PROBLEM — E23.0: Status: ACTIVE | Noted: 2019-09-08

## 2019-09-08 LAB
ACTH: < 1 PG/ML (ref 7.2–63.3)
ANION GAP SERPL CALCULATED.3IONS-SCNC: 13 MEQ/L (ref 8–16)
AVERAGE GLUCOSE: 81 MG/DL (ref 70–126)
BUN BLDV-MCNC: 5 MG/DL (ref 7–22)
CALCIUM SERPL-MCNC: 8.7 MG/DL (ref 8.5–10.5)
CHLORIDE BLD-SCNC: 109 MEQ/L (ref 98–111)
CO2: 18 MEQ/L (ref 23–33)
CREAT SERPL-MCNC: 0.5 MG/DL (ref 0.4–1.2)
GFR SERPL CREATININE-BSD FRML MDRD: > 90 ML/MIN/1.73M2
GLUCOSE BLD-MCNC: 128 MG/DL (ref 70–108)
GLUCOSE BLD-MCNC: 178 MG/DL (ref 70–108)
GLUCOSE BLD-MCNC: 183 MG/DL (ref 70–108)
GLUCOSE BLD-MCNC: 205 MG/DL (ref 70–108)
HBA1C MFR BLD: 4.7 % (ref 4.4–6.4)
MISC. #1 REFERENCE GROUP TEST: ABNORMAL
POTASSIUM SERPL-SCNC: 3.9 MEQ/L (ref 3.5–5.2)
SODIUM BLD-SCNC: 140 MEQ/L (ref 135–145)
TOTAL CK: 330 U/L (ref 30–135)

## 2019-09-08 PROCEDURE — 6360000002 HC RX W HCPCS: Performed by: INTERNAL MEDICINE

## 2019-09-08 PROCEDURE — 83036 HEMOGLOBIN GLYCOSYLATED A1C: CPT

## 2019-09-08 PROCEDURE — 2709999900 HC NON-CHARGEABLE SUPPLY

## 2019-09-08 PROCEDURE — 97535 SELF CARE MNGMENT TRAINING: CPT

## 2019-09-08 PROCEDURE — 2060000000 HC ICU INTERMEDIATE R&B

## 2019-09-08 PROCEDURE — 36415 COLL VENOUS BLD VENIPUNCTURE: CPT

## 2019-09-08 PROCEDURE — 6370000000 HC RX 637 (ALT 250 FOR IP): Performed by: INTERNAL MEDICINE

## 2019-09-08 PROCEDURE — 82550 ASSAY OF CK (CPK): CPT

## 2019-09-08 PROCEDURE — 82948 REAGENT STRIP/BLOOD GLUCOSE: CPT

## 2019-09-08 PROCEDURE — 99232 SBSQ HOSP IP/OBS MODERATE 35: CPT | Performed by: INTERNAL MEDICINE

## 2019-09-08 PROCEDURE — 80048 BASIC METABOLIC PNL TOTAL CA: CPT

## 2019-09-08 PROCEDURE — 97165 OT EVAL LOW COMPLEX 30 MIN: CPT

## 2019-09-08 PROCEDURE — 97166 OT EVAL MOD COMPLEX 45 MIN: CPT

## 2019-09-08 PROCEDURE — 2580000003 HC RX 258: Performed by: HOSPITALIST

## 2019-09-08 PROCEDURE — 6360000002 HC RX W HCPCS: Performed by: HOSPITALIST

## 2019-09-08 RX ORDER — HYDROCORTISONE 5 MG/1
5 TABLET ORAL
Status: DISCONTINUED | OUTPATIENT
Start: 2019-09-09 | End: 2019-09-11 | Stop reason: HOSPADM

## 2019-09-08 RX ORDER — HYDROCORTISONE 10 MG/1
10 TABLET ORAL 2 TIMES DAILY
Status: DISCONTINUED | OUTPATIENT
Start: 2019-09-08 | End: 2019-09-11 | Stop reason: HOSPADM

## 2019-09-08 RX ADMIN — HYDROCORTISONE 10 MG: 10 TABLET ORAL at 20:58

## 2019-09-08 RX ADMIN — FLUDROCORTISONE ACETATE 100 MCG: 0.1 TABLET ORAL at 20:58

## 2019-09-08 RX ADMIN — ESTRADIOL 1 MG: 1 TABLET ORAL at 08:44

## 2019-09-08 RX ADMIN — HYDROCORTISONE SODIUM SUCCINATE 100 MG: 100 INJECTION, POWDER, FOR SOLUTION INTRAMUSCULAR; INTRAVENOUS at 03:06

## 2019-09-08 RX ADMIN — ENOXAPARIN SODIUM 40 MG: 40 INJECTION SUBCUTANEOUS at 08:41

## 2019-09-08 RX ADMIN — MEDROXYPROGESTERONE ACETATE 2.5 MG: 2.5 TABLET ORAL at 08:44

## 2019-09-08 RX ADMIN — HYDROCORTISONE 10 MG: 10 TABLET ORAL at 13:03

## 2019-09-08 RX ADMIN — FLUDROCORTISONE ACETATE 100 MCG: 0.1 TABLET ORAL at 08:42

## 2019-09-08 RX ADMIN — Medication 10 ML: at 08:42

## 2019-09-08 RX ADMIN — Medication 10 ML: at 20:59

## 2019-09-08 RX ADMIN — LEVOTHYROXINE SODIUM 200 MCG: 100 TABLET ORAL at 06:04

## 2019-09-08 ASSESSMENT — PAIN SCALES - GENERAL
PAINLEVEL_OUTOF10: 0

## 2019-09-08 ASSESSMENT — PAIN DESCRIPTION - PAIN TYPE: TYPE: ACUTE PAIN

## 2019-09-08 NOTE — PLAN OF CARE
Problem: Falls - Risk of:  Goal: Will remain free from falls  Description  Will remain free from falls  9/8/2019 1020 by Jade Wolf RN  Outcome: Ongoing  Note:   Call light within reach. Side rails up x2. Bed alarm on. Non skid slippers available. Hourly rounding done. Problem: Skin Integrity:  Goal: Absence of new skin breakdown  Description  Absence of new skin breakdown  Outcome: Ongoing  Note:   Patient free from skin breakdown. Patient turns self and makes frequent positional changes. Will continue to monitor. Problem: Self-Care:  Goal: Ability to participate in self-care as condition permits will improve  Description  Ability to participate in self-care as condition permits will improve  Outcome: Ongoing  Note:   Encourage patient to perform as much of daily care as she can. Problem: Discharge Planning:  Goal: Participates in care planning  Description  Participates in care planning  Outcome: Ongoing  Note:   All changes in care and plans for discharge will be communicated to patient and family. Problem: Risk for Impaired Skin Integrity  Goal: Tissue integrity - skin and mucous membranes  Description  Structural intactness and normal physiological function of skin and  mucous membranes. Outcome: Ongoing  Note:   No signs of new skin breakdown with each assessment. Skin remains warm, dry, intact. Mucous membranes pink & moist. Patient is able to turn self. Problem: Injury - Risk of, Physical Injury:  Goal: Will remain free from falls  Description  Will remain free from falls  9/8/2019 1020 by Jade Wolf RN  Outcome: Ongoing  Note:   Call light within reach. Side rails up x2. Bed alarm on. Non skid slippers available. Hourly rounding done.      Problem: Sensory Perception - Impaired:  Goal: Demonstrates accurate environmental perceptions  Description  Demonstrates accurate environmental perceptions  Outcome: Ongoing  Note:   Continuing to evaluate  patients orientation and

## 2019-09-08 NOTE — PROGRESS NOTES
Department of Internal Medicine  Section of Endocrinology   [facility] ENDOCRINOLOGY AND DIABETES CLINIC    [address] , [address], [address]  Office Phone [address]  ([address])  ([address])  Dulce Maria Cancino MD, Fellow of [employer]   Progress Note    Admit Date: 9/4/2019  Hospital day  Reviewed the chart of the last 24 hours:   SUBJECTIVE:     Chief Complaint   Patient presents with    Altered Mental Status    Hypotension    Other     not eating or drinking for 3-6 days     Acute metabolic encephalopathy [G27.34]   Patient Active Problem List   Diagnosis Code    Metabolic acidosis V63.2    Acute metabolic encephalopathy J88.71     Acute metabolic encephalopathy  9/8/6516  4:30 PM  Admission weight: 120 lb (54.4 kg)  [id number]  [id number]  4A-09/009-A      Patient has complaints bed sore. .   Medication side effects: none  Patient is eating 100%    Review of Systems  Review of Systems - General ROS: negative   Psychological ROS: negative   Hematological and Lymphatic ROS: No history of blood clots or bleeding disorder. Respiratory ROS: no cough, shortness of breath, or wheezing   Cardiovascular ROS: no chest pain or dyspnea on exertion   Gastrointestinal ROS: no abdominal pain, change in bowel habits, or black or bloody stools   Genito-Urinary ROS: no dysuria, trouble voiding, or hematuria   Musculoskeletal ROS: negative   Neurological ROS: no TIA or stroke symptoms   Dermatological ROS: negative    Past Medical, Surgical, Family, Social and Allergy Histories:  I have reviewed the patient's medical history in detail and updated the computerized patient record. has no past medical history on file. has no past surgical history on file. reports that she has quit smoking.  She has never used smokeless tobacco. She reports that she drinks about 1.0 standard drinks of alcohol per week. She reports that she does not use drugs. family history is not on file.   Allergies   Allergen Reactions    Morphine And Related      Current Facility-Administered Medications   Medication Dose Route Frequency Provider Last Rate Last Dose    hydrocortisone (CORTEF) tablet 10 mg  10 mg Oral BID Wil Bowden MD   10 mg at 09/08/19 1303    medroxyPROGESTERone (PROVERA) tablet 2.5 mg  2.5 mg Oral Daily Jasmin Randolph MD   2.5 mg at 09/08/19 0844    estradiol (ESTRACE) tablet 1 mg  1 mg Oral Daily Wil Bowden MD   1 mg at 09/08/19 0844    vitamin D (ERGOCALCIFEROL) capsule 50,000 Units  50,000 Units Oral Once per day on Mon Wed Fri Wil Bowden MD   50,000 Units at 09/07/19 2127    fludrocortisone (FLORINEF) tablet 100 mcg  100 mcg Oral BID Wil Bowden MD   100 mcg at 09/08/19 2875    sodium chloride flush 0.9 % injection 10 mL  10 mL Intravenous 2 times per day Festus Palm, DO   10 mL at 09/08/19 7611    sodium chloride flush 0.9 % injection 10 mL  10 mL Intravenous PRN Festus Palm, DO        magnesium hydroxide (MILK OF MAGNESIA) 400 MG/5ML suspension 30 mL  30 mL Oral Daily PRN Festus Palm, DO        ondansetron [facility]) injection 4 mg  4 mg Intravenous Q6H PRN Festus Palm, DO        0.9 % sodium chloride bolus  1,000 mL Intravenous Once Festus Palm, DO        magnesium sulfate 1 g in dextrose 5 % 100 mL IVPB  1 g Intravenous PRN Festus Palm, DO        potassium chloride (KLOR-CON M) extended release tablet 40 mEq  40 mEq Oral PRN Festus Palm, DO        Or    potassium bicarb-citric acid (EFFER-K) effervescent tablet 40 mEq  40 mEq Oral PRN Festus Palm, DO        Or    potassium chloride 10 mEq/100 mL IVPB (Peripheral Line)  10 mEq Intravenous PRN Festus Palm, DO        enoxaparin (LOVENOX) injection 40 mg  40 mg Subcutaneous Daily Festus Palm, DO   40 mg at 09/08/19 0841    acetaminophen (TYLENOL) tablet 650 mg  650 mg Oral Q6H PRN Festus Palm, DO   650 mg at 09/06/19 2337     Home Meds:   Prior to Admission medications    Medication Sig Start Date End Date Taking?  Authorizing Provider   Estradiol-Progesterone 1-100 MG CAPS Take 1 capsule by mouth daily 9/7/19  Yes Cliff Ma MD     Scheduled Meds:   hydrocortisone  10 mg Oral BID    medroxyPROGESTERone  2.5 mg Oral Daily    estradiol  1 mg Oral Daily    vitamin D  50,000 Units Oral Once per day on Mon Wed Fri    fludrocortisone  100 mcg Oral BID    sodium chloride flush  10 mL Intravenous 2 times per day    sodium chloride  1,000 mL Intravenous Once    enoxaparin  40 mg Subcutaneous Daily     Continuous Infusions:  PRN Meds:sodium chloride flush, magnesium hydroxide, ondansetron, magnesium sulfate, potassium chloride **OR** potassium alternative oral replacement **OR** potassium chloride, acetaminophen    OBJECTIVE        Physical    VITALS:  BP 99/63   Pulse 80   Temp 99 °F (37.2 °C) (Oral)   Resp 18   Ht 5' 2\" (1.575 m)   Wt 133 lb 4 oz (60.4 kg)   SpO2 96%   BMI 24.37 kg/m²   CONSTITUTIONAL:  awake, alert, cooperative, no apparent distress, and appears stated age  LUNGS:  No increased work of breathing, good air exchange, clear to auscultation bilaterally, no crackles or wheezing  CARDIOVASCULAR:  Normal apical impulse, regular rate and rhythm, normal S1 and S2, no S3 or S4, and no murmur noted  ABDOMEN:  No scars, normal bowel sounds, soft, non-distended, non-tender, no masses palpated, no hepatosplenomegally    DATA  TSH:    Lab Results   Component Value Date    TSH 0.032 09/04/2019   No components found for: FREE T4No components found for: FREET3  RADIOLOGYREPORTS:    Lab Review  @LASTGLUCOSEx3@  [unfilled]  Lab Results   Component Value Date    TSH 0.032 (L) 09/04/2019    T4FREE 0.69 (L) 09/04/2019     No results found for: FREET4  No results found for: TESTOSTERONE  CBC:  Lab Results   Component Value Date    WBC 6.9 09/05/2019    RBC 3.77 09/05/2019    HGB 11.8 09/05/2019    HCT 35.6 09/05/2019    MCV 94.4 09/05/2019    MCH 31.3 09/05/2019    MCHC 33.1 09/05/2019     09/05/2019    MPV 10.1 09/05/2019     CMP:    Lab Results   Component Value Date     09/08/2019    K 3.9 09/08/2019    K 4.2 09/05/2019     09/08/2019    CO2 18 09/08/2019    BUN 5 09/08/2019    CREATININE 0.5 09/08/2019    LABGLOM >90 09/08/2019    GLUCOSE 183 09/08/2019    PROT 6.2 09/05/2019    LABALBU 3.2 09/05/2019    CALCIUM 8.7 09/08/2019    BILITOT 0.6 09/05/2019    ALKPHOS 82 09/05/2019    AST 54 09/05/2019    ALT 14 09/05/2019     Hepatic Function Panel:    Lab Results   Component Value Date    ALKPHOS 82 09/05/2019    ALT 14 09/05/2019    AST 54 09/05/2019    PROT 6.2 09/05/2019    BILITOT 0.6 09/05/2019    BILIDIR 0.3 09/04/2019    LABALBU 3.2 09/05/2019     Magnesium:    Lab Results   Component Value Date    MG 1.8 09/04/2019   Cortrosyn Stim test: . It is a Abnormal  LOW baseline, low ACTH, poor response to cortrosyn response. Results for Manish Rockwell (MRN [id number]) as of 9/8/2019 16:42   Ref. Range 9/7/2019 10:15 9/7/2019 10:49 9/7/2019 11:22   Cortisol Latest Units: ug/dL 1.37 5.93 8.48   Cortisol Collection Info Unknown Baseline 30 Minute 60 Minute        PT/INR:  No results found for: PROTIME, INR  HgBA1c:  No results found for: LABA1C   Recent Labs     09/06/19  0350 09/07/19  0340 09/08/19  0339   CKTOTAL 1,037* 444* 330*     FLP:  No results found for: TRIG, HDL, LDLCALC, LDLDIRECT, LABVLDL  DIET: DIET GENERAL; No Added Salt (3-4 GM)    Acth < 1.0Low      Results for Manish Rockwell (MRN [id number]) as of 9/8/2019 16:42   Ref. Range 9/8/2019 12:10   POC Glucose Latest Ref Range: 70 - 108 mg/dl 205 (H)   Results for Manish Ditch (MRN [id number]) as of 9/8/2019 16:42   Ref.  Range 9/5/2019 18:43   FSH Latest Ref Range: 16.0 - 160.0 mIU/ml 0.7 (L)   LH Latest Ref Range: 3.3 - 70.6 mIU/ml 0.5 (L)   Prolactin Latest Units: ng/ml 4.6   Results for Manish Ditch (MRN [id number]) as of 9/8/2019 16:42 Ref. Range 9/5/2019 18:43   FSH Latest Ref Range: 16.0 - 160.0 mIU/ml 0.7 (L)   LH Latest Ref Range: 3.3 - 70.6 mIU/ml 0.5 (L)   Prolactin Latest Units: ng/ml 4.6   Results for Felix Brewer (MRN [id number]) as of 9/8/2019 16:42   Ref. Range 9/4/2019 16:35 9/6/2019 03:50   Estradiol Latest Units: pg/mL  24.1   Results for Felix Brewer (MRN [id number]) as of 9/8/2019 16:42   Ref.  Range 9/7/2019 11:22 9/7/2019 11:31   Vit D, 25-Hydroxy Latest Ref Range: 30 - 100 ng/ml  16 (L)     Impression:    Waiting for A1C result  [place] 2X2\"  ASSESSMENT AND PLAN    Add daytime hydrocortisone because Systolic BP is 99

## 2019-09-09 LAB
ANION GAP SERPL CALCULATED.3IONS-SCNC: 11 MEQ/L (ref 8–16)
BUN BLDV-MCNC: 5 MG/DL (ref 7–22)
CALCIUM SERPL-MCNC: 9.1 MG/DL (ref 8.5–10.5)
CHLORIDE BLD-SCNC: 111 MEQ/L (ref 98–111)
CO2: 24 MEQ/L (ref 23–33)
CREAT SERPL-MCNC: 0.6 MG/DL (ref 0.4–1.2)
GFR SERPL CREATININE-BSD FRML MDRD: > 90 ML/MIN/1.73M2
GLUCOSE BLD-MCNC: 110 MG/DL (ref 70–108)
GLUCOSE BLD-MCNC: 110 MG/DL (ref 70–108)
GLUCOSE BLD-MCNC: 114 MG/DL (ref 70–108)
GLUCOSE BLD-MCNC: 125 MG/DL (ref 70–108)
GLUCOSE BLD-MCNC: 92 MG/DL (ref 70–108)
POTASSIUM SERPL-SCNC: 4.1 MEQ/L (ref 3.5–5.2)
SODIUM BLD-SCNC: 146 MEQ/L (ref 135–145)
TOTAL CK: 187 U/L (ref 30–135)

## 2019-09-09 PROCEDURE — 6370000000 HC RX 637 (ALT 250 FOR IP): Performed by: INTERNAL MEDICINE

## 2019-09-09 PROCEDURE — 82550 ASSAY OF CK (CPK): CPT

## 2019-09-09 PROCEDURE — 97110 THERAPEUTIC EXERCISES: CPT

## 2019-09-09 PROCEDURE — 97116 GAIT TRAINING THERAPY: CPT

## 2019-09-09 PROCEDURE — 2709999900 HC NON-CHARGEABLE SUPPLY

## 2019-09-09 PROCEDURE — 36415 COLL VENOUS BLD VENIPUNCTURE: CPT

## 2019-09-09 PROCEDURE — 97530 THERAPEUTIC ACTIVITIES: CPT

## 2019-09-09 PROCEDURE — 82948 REAGENT STRIP/BLOOD GLUCOSE: CPT

## 2019-09-09 PROCEDURE — 2580000003 HC RX 258: Performed by: HOSPITALIST

## 2019-09-09 PROCEDURE — 2060000000 HC ICU INTERMEDIATE R&B

## 2019-09-09 PROCEDURE — 6360000002 HC RX W HCPCS: Performed by: HOSPITALIST

## 2019-09-09 PROCEDURE — 80048 BASIC METABOLIC PNL TOTAL CA: CPT

## 2019-09-09 PROCEDURE — 97535 SELF CARE MNGMENT TRAINING: CPT

## 2019-09-09 PROCEDURE — 99232 SBSQ HOSP IP/OBS MODERATE 35: CPT | Performed by: INTERNAL MEDICINE

## 2019-09-09 RX ADMIN — Medication 10 ML: at 10:08

## 2019-09-09 RX ADMIN — MEDROXYPROGESTERONE ACETATE 2.5 MG: 2.5 TABLET ORAL at 10:07

## 2019-09-09 RX ADMIN — HYDROCORTISONE 5 MG: 10 TABLET ORAL at 10:08

## 2019-09-09 RX ADMIN — ENOXAPARIN SODIUM 40 MG: 40 INJECTION SUBCUTANEOUS at 10:06

## 2019-09-09 RX ADMIN — FLUDROCORTISONE ACETATE 100 MCG: 0.1 TABLET ORAL at 10:07

## 2019-09-09 RX ADMIN — ESTRADIOL 1 MG: 1 TABLET ORAL at 10:07

## 2019-09-09 RX ADMIN — HYDROCORTISONE 10 MG: 10 TABLET ORAL at 20:17

## 2019-09-09 RX ADMIN — HYDROCORTISONE 10 MG: 10 TABLET ORAL at 10:07

## 2019-09-09 RX ADMIN — ERGOCALCIFEROL 50000 UNITS: 1.25 CAPSULE ORAL at 20:18

## 2019-09-09 RX ADMIN — Medication 10 ML: at 20:18

## 2019-09-09 RX ADMIN — FLUDROCORTISONE ACETATE 100 MCG: 0.1 TABLET ORAL at 20:17

## 2019-09-09 ASSESSMENT — PAIN SCALES - GENERAL: PAINLEVEL_OUTOF10: 0

## 2019-09-09 NOTE — PROGRESS NOTES
Spoke to primary nurse regarding open blister consult. Pt had blister present on admission that has since ruptured to coccyx area. Nursing has sacral bordered foam inplace to deroofed blister - pt is continent of bowel and bladder. Pt on hercules dream air support surface with alternating pressure and microclimate control. Recommend waffle cushion to chair. Informed primary nurse to call wound and ostomy if wound appears to be yellow to wound bed, deep or shows s/s of infection. Otherwise would continue current treatment and monitor area. Turn pt every 2 hours to offload area.

## 2019-09-09 NOTE — PROGRESS NOTES
[facility]  PHYSICAL THERAPY MISSED TREATMENT NOTE  ACUTE CARE  [facility] NEUROSCIENCES 4A       x 1 - pt with OT        Missed Treatment  Elise Shah PTA [id number]

## 2019-09-09 NOTE — CARE COORDINATION
9/9/19  3:07 PM  Clinical update: Hospitalist following. Endocrinology consult pending fro central hypothyroidism. PT/OT. PMR consult. Would be unable to go to TCU r/t medicaid pending but possible IPR vs ECF. CM will continue to follow.

## 2019-09-09 NOTE — PLAN OF CARE
at this time. Problem: Mood - Altered:  Goal: Mood stable  Description  Mood stable  Outcome: Met This Shift     Problem: Mood - Altered:  Goal: Absence of abusive behavior  Description  Absence of abusive behavior  Outcome: Met This Shift     Problem: Mood - Altered:  Goal: Verbalizations of feeling emotionally comfortable while being cared for will increase  Description  Verbalizations of feeling emotionally comfortable while being cared for will increase  Outcome: Met This Shift     Problem: Pain:  Goal: Pain level will decrease  Description  Pain level will decrease  Outcome: Met This Shift     Problem: Coping:  Goal: Ability to remain calm will improve  Description  Ability to remain calm will improve  Outcome: Ongoing     Problem: Coping:  Goal: Ability to remain calm will improve  Description  Ability to remain calm will improve  Outcome: Ongoing     Problem: Self-Care:  Goal: Ability to participate in self-care as condition permits will improve  Description  Ability to participate in self-care as condition permits will improve  Outcome: Ongoing  Note:   Able to participate in self care. Problem: Discharge Planning:  Goal: Discharged to appropriate level of care  Description  Discharged to appropriate level of care  Outcome: Ongoing     Problem: Discharge Planning:  Goal: Ability to perform activities of daily living will improve  Description  Ability to perform activities of daily living will improve  Outcome: Ongoing     Problem: Discharge Planning:  Goal: Participates in care planning  Description  Participates in care planning  Outcome: Ongoing     Problem: Risk for Impaired Skin Integrity  Goal: Tissue integrity - skin and mucous membranes  Description  Structural intactness and normal physiological function of skin and  mucous membranes.   Outcome: Ongoing     Problem: Confusion - Acute:  Goal: Mental status will be restored to baseline  Description  Mental status will be restored to baseline  Outcome: Ongoing     Problem: Psychomotor Activity - Altered:  Goal: Absence of psychomotor disturbance signs and symptoms  Description  Absence of psychomotor disturbance signs and symptoms  Outcome: Ongoing     Problem: Sensory Perception - Impaired:  Goal: Demonstrations of improved sensory functioning will increase  Description  Demonstrations of improved sensory functioning will increase  Outcome: Ongoing     Problem: Sensory Perception - Impaired:  Goal: Decrease in sensory misperception frequency  Description  Decrease in sensory misperception frequency  Outcome: Ongoing     Problem: Sensory Perception - Impaired:  Goal: Able to refrain from responding to false sensory perceptions  Description  Able to refrain from responding to false sensory perceptions  Outcome: Ongoing     Problem: Sensory Perception - Impaired:  Goal: Demonstrates accurate environmental perceptions  Description  Demonstrates accurate environmental perceptions  Outcome: Ongoing     Problem: Sensory Perception - Impaired:  Goal: Able to distinguish between reality-based and nonreality-based thinking  Description  Able to distinguish between reality-based and nonreality-based thinking  Outcome: Ongoing     Problem: Sensory Perception - Impaired:  Goal: Able to interrupt nonreality-based thinking  Description  Able to interrupt nonreality-based thinking  Outcome: Ongoing     Problem: Sleep Pattern Disturbance:  Goal: Appears well-rested  Description  Appears well-rested  Outcome: Ongoing    Care plan reviewed with patient. Patient verbalizes understanding of the plan of care and contributed to goal setting.

## 2019-09-10 LAB
ANION GAP SERPL CALCULATED.3IONS-SCNC: 11 MEQ/L (ref 8–16)
BLOOD CULTURE, ROUTINE: NORMAL
BLOOD CULTURE, ROUTINE: NORMAL
BUN BLDV-MCNC: 5 MG/DL (ref 7–22)
CALCIUM SERPL-MCNC: 8.5 MG/DL (ref 8.5–10.5)
CHLORIDE BLD-SCNC: 111 MEQ/L (ref 98–111)
CO2: 27 MEQ/L (ref 23–33)
CREAT SERPL-MCNC: 0.6 MG/DL (ref 0.4–1.2)
GFR SERPL CREATININE-BSD FRML MDRD: > 90 ML/MIN/1.73M2
GLUCOSE BLD-MCNC: 114 MG/DL (ref 70–108)
GLUCOSE BLD-MCNC: 123 MG/DL (ref 70–108)
GLUCOSE BLD-MCNC: 78 MG/DL (ref 70–108)
GLUCOSE BLD-MCNC: 85 MG/DL (ref 70–108)
GLUCOSE BLD-MCNC: 92 MG/DL (ref 70–108)
POTASSIUM SERPL-SCNC: 3.9 MEQ/L (ref 3.5–5.2)
SODIUM BLD-SCNC: 149 MEQ/L (ref 135–145)
T4 FREE: 1.38 NG/DL (ref 0.93–1.76)

## 2019-09-10 PROCEDURE — 36415 COLL VENOUS BLD VENIPUNCTURE: CPT

## 2019-09-10 PROCEDURE — 97535 SELF CARE MNGMENT TRAINING: CPT

## 2019-09-10 PROCEDURE — 97110 THERAPEUTIC EXERCISES: CPT

## 2019-09-10 PROCEDURE — 2060000000 HC ICU INTERMEDIATE R&B

## 2019-09-10 PROCEDURE — 97530 THERAPEUTIC ACTIVITIES: CPT

## 2019-09-10 PROCEDURE — 80048 BASIC METABOLIC PNL TOTAL CA: CPT

## 2019-09-10 PROCEDURE — 99232 SBSQ HOSP IP/OBS MODERATE 35: CPT | Performed by: INTERNAL MEDICINE

## 2019-09-10 PROCEDURE — 84439 ASSAY OF FREE THYROXINE: CPT

## 2019-09-10 PROCEDURE — 82948 REAGENT STRIP/BLOOD GLUCOSE: CPT

## 2019-09-10 PROCEDURE — 6370000000 HC RX 637 (ALT 250 FOR IP): Performed by: INTERNAL MEDICINE

## 2019-09-10 PROCEDURE — 2580000003 HC RX 258: Performed by: INTERNAL MEDICINE

## 2019-09-10 PROCEDURE — 97116 GAIT TRAINING THERAPY: CPT

## 2019-09-10 PROCEDURE — 2580000003 HC RX 258: Performed by: HOSPITALIST

## 2019-09-10 PROCEDURE — 2709999900 HC NON-CHARGEABLE SUPPLY

## 2019-09-10 RX ORDER — HYDROCORTISONE 5 MG/1
10 TABLET ORAL 2 TIMES DAILY
COMMUNITY

## 2019-09-10 RX ORDER — DEXTROSE MONOHYDRATE 50 MG/ML
INJECTION, SOLUTION INTRAVENOUS CONTINUOUS
Status: ACTIVE | OUTPATIENT
Start: 2019-09-10 | End: 2019-09-11

## 2019-09-10 RX ORDER — LEVOTHYROXINE SODIUM 0.1 MG/1
100 TABLET ORAL DAILY
Status: DISCONTINUED | OUTPATIENT
Start: 2019-09-10 | End: 2019-09-11 | Stop reason: HOSPADM

## 2019-09-10 RX ORDER — LEVOTHYROXINE AND LIOTHYRONINE 38; 9 UG/1; UG/1
60 TABLET ORAL DAILY
Status: ON HOLD | COMMUNITY
End: 2019-09-11 | Stop reason: HOSPADM

## 2019-09-10 RX ADMIN — MEDROXYPROGESTERONE ACETATE 2.5 MG: 2.5 TABLET ORAL at 08:16

## 2019-09-10 RX ADMIN — HYDROCORTISONE 10 MG: 10 TABLET ORAL at 21:06

## 2019-09-10 RX ADMIN — ESTRADIOL 1 MG: 1 TABLET ORAL at 08:16

## 2019-09-10 RX ADMIN — FLUDROCORTISONE ACETATE 100 MCG: 0.1 TABLET ORAL at 21:06

## 2019-09-10 RX ADMIN — Medication 10 ML: at 08:16

## 2019-09-10 RX ADMIN — HYDROCORTISONE 10 MG: 10 TABLET ORAL at 08:16

## 2019-09-10 RX ADMIN — DEXTROSE MONOHYDRATE: 50 INJECTION, SOLUTION INTRAVENOUS at 23:21

## 2019-09-10 RX ADMIN — DEXTROSE MONOHYDRATE: 50 INJECTION, SOLUTION INTRAVENOUS at 09:28

## 2019-09-10 RX ADMIN — HYDROCORTISONE 5 MG: 10 TABLET ORAL at 11:47

## 2019-09-10 RX ADMIN — FLUDROCORTISONE ACETATE 100 MCG: 0.1 TABLET ORAL at 08:16

## 2019-09-10 RX ADMIN — LEVOTHYROXINE SODIUM 100 MCG: 100 TABLET ORAL at 17:37

## 2019-09-10 ASSESSMENT — PAIN SCALES - GENERAL: PAINLEVEL_OUTOF10: 0

## 2019-09-10 NOTE — PROGRESS NOTES
Department of Internal Medicine  Section of Endocrinology   [address]    [address] [place], [address]  Office Phone Mahesh [address]  ([address])  ([address])  [address] Angelita Sosa MD, Fellow of [employer]   Progress Note    Admit Date: 9/4/2019    Reviewed the chart of the last 24 hours:   SUBJECTIVE:     Chief Complaint   Patient presents with    Altered Mental Status    Hypotension    Other     not eating or drinking for 3-6 days     Acute metabolic encephalopathy [N91.46]   Patient Active Problem List   Diagnosis Code    Metabolic acidosis Q04.1    Acute metabolic encephalopathy N41.14    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 .   Medication side effects: none  Patient is eating 100%    Review of Systems  Review of Systems - General ROS: negative   Psychological ROS: negative   Hematological and Lymphatic ROS: No history of blood clots or bleeding disorder. Respiratory ROS: no cough, shortness of breath, or wheezing   Cardiovascular ROS: no chest pain or dyspnea on exertion   Gastrointestinal ROS: no abdominal pain, change in bowel habits, or black or bloody stools   Genito-Urinary ROS: no dysuria, trouble voiding, or hematuria   Musculoskeletal ROS: negative   Neurological ROS: no TIA or stroke symptoms   Dermatological ROS: negative    Past Medical, Surgical, Family, Social and Allergy Histories:  I have reviewed the patient's medical history in detail and updated the computerized patient record. has no past medical history on file. has no past surgical history on file. reports that she has quit smoking.  She has S4, and no murmur noted  ABDOMEN:  No scars, normal bowel sounds, soft, non-distended, non-tender, no masses palpated, no hepatosplenomegally    DATA  TSH:    Lab Results   Component Value Date    TSH 0.032 09/04/2019   No components found for: FREE T4No components found for: FREET3  RADIOLOGYREPORTS:    Lab Review  @LASTGLUCOSEx3@  [unfilled]  Lab Results   Component Value Date    TSH 0.032 (L) 09/04/2019    T4FREE 0.69 (L) 09/04/2019     No results found for: FREET4  No results found for: TESTOSTERONE  CBC:  Lab Results   Component Value Date    WBC 6.9 09/05/2019    RBC 3.77 09/05/2019    HGB 11.8 09/05/2019    HCT 35.6 09/05/2019    MCV 94.4 09/05/2019    MCH 31.3 09/05/2019    MCHC 33.1 09/05/2019     09/05/2019    MPV 10.1 09/05/2019     CMP:    Lab Results   Component Value Date     09/10/2019    K 3.9 09/10/2019    K 4.2 09/05/2019     09/10/2019    CO2 27 09/10/2019    BUN 5 09/10/2019    CREATININE 0.6 09/10/2019    LABGLOM >90 09/10/2019    GLUCOSE 85 09/10/2019    PROT 6.2 09/05/2019    LABALBU 3.2 09/05/2019    CALCIUM 8.5 09/10/2019    BILITOT 0.6 09/05/2019    ALKPHOS 82 09/05/2019    AST 54 09/05/2019    ALT 14 09/05/2019     Hepatic Function Panel:    Lab Results   Component Value Date    ALKPHOS 82 09/05/2019    ALT 14 09/05/2019    AST 54 09/05/2019    PROT 6.2 09/05/2019    BILITOT 0.6 09/05/2019    BILIDIR 0.3 09/04/2019    LABALBU 3.2 09/05/2019     Magnesium:    Lab Results   Component Value Date    MG 1.8 09/04/2019     PT/INR:  No results found for: PROTIME, INR  HgBA1c:    Lab Results   Component Value Date    LABA1C 4.7 09/08/2019      Recent Labs     09/08/19  0339 09/09/19  0339   CKTOTAL 330* 187*     FLP:  No results found for: TRIG, HDL, LDLCALC, LDLDIRECT, LABVLDL  DIET: DIET GENERAL; No Added Salt (3-4 GM)  Impression:    Pan hypopit    ASSESSMENT AND PLAN

## 2019-09-10 NOTE — PLAN OF CARE
Problem: Falls - Risk of:  Goal: Will remain free from falls  Description  Will remain free from falls  9/10/2019 0007 by Eder Melendez RN  Note:   Fall precaution maintained this shift. Nonskid socks on, call light within reach, bed alarm on, 2/4 side rails raised. No falls. Problem: Skin Integrity:  Goal: Absence of new skin breakdown  Description  Absence of new skin breakdown  9/10/2019 0007 by Eder Melendez RN  Note:   Pt turns self in bed with pillow support. Heels elevated as tolerated. No new evidence of skin breakdown noted. Problem: Confusion - Acute:  Goal: Absence of continued neurological deterioration signs and symptoms  Description  Absence of continued neurological deterioration signs and symptoms  9/10/2019 0007 by Eder Melendez RN  Note:   Pt alert and oriented x4 most of shift. Confused at times. Problem: Injury - Risk of, Physical Injury:  Goal: Will remain free from falls  Description  Will remain free from falls  9/10/2019 0007 by Eder Melendez RN  Note:   Fall precaution maintained this shift. Nonskid socks on, call light within reach, bed alarm on, 2/4 side rails raised. No falls. Problem: Pain:  Goal: Pain level will decrease  Description  Pain level will decrease  9/10/2019 0007 by Eder Melendez RN  Note:   Pt doesn't complain of pain this shift. Problem: Coping:  Goal: Ability to remain calm will improve  Description  Ability to remain calm will improve  9/10/2019 0007 by Eder Melendez RN  Note:   Pt calm this shift. Problem: Discharge Planning:  Goal: Discharged to appropriate level of care  Description  Discharged to appropriate level of care  9/10/2019 0007 by Eder Melendez RN  Note:   Pt from home. Discharge planning remains in place. Care plan reviewed with patient. Patient verbalize understanding of the plan of care and contribute to goal setting.

## 2019-09-10 NOTE — PROGRESS NOTES
acetaminophen    Diet:  DIET GENERAL; No Added Salt (3-4 GM)    REVIEW OF SYSTEMS:   Pertinent positives as noted in the HPI. All other systems reviewed and negative. PHYSICAL EXAM:    /71   Pulse 72   Temp 98.2 °F (36.8 °C) (Oral)   Resp 16   Ht 5' 2\" (1.575 m)   Wt 130 lb 1.6 oz (59 kg)   SpO2 98%   BMI 23.80 kg/m²     General appearance:  No apparent distress, appears stated age and cooperative.,gives a blank stare when questions are asked  HEENT:  Dry oral mucosa. Poor dentition. Neck: No jugular venous distention. Trachea midline. Respiratory:  Normal respiratory effort. Clear to auscultation, bilaterally without Rales/Wheezes/Rhonchi. Cardiovascular:  Regular rate and rhythm with normal S1/S2 without murmurs, rubs or gallops. Abdomen: Soft, non-tender, non-distended with normal bowel sounds. Musculoskeletal:  No clubbing, cyanosis or edema bilaterally. Skin: Dry and warm. Neurologic:  Limited exam.  No obvious deficit noted. Psychiatric:  Alert and oriented to self. Capillary Refill: Brisk,< 3 seconds   Peripheral Pulses: +2 palpable, equal bilaterally       Labs:     No results for input(s): WBC, HGB, HCT, PLT in the last 72 hours. Recent Labs     09/08/19 0339 09/09/19 0339 09/10/19  0358    146* 149*   K 3.9 4.1 3.9    111 111   CO2 18* 24 27   BUN 5* 5* 5*   CREATININE 0.5 0.6 0.6   CALCIUM 8.7 9.1 8.5     No results for input(s): AST, ALT, BILIDIR, BILITOT, ALKPHOS in the last 72 hours. No results for input(s): INR in the last 72 hours. Recent Labs     09/08/19 0339 09/09/19 0339   CKTOTAL 330* 187*       Urinalysis:      Lab Results   Component Value Date    NITRU NEGATIVE 09/04/2019    WBCUA 0-2 09/04/2019    BACTERIA NONE 09/04/2019    RBCUA 0-2 09/04/2019    BLOODU NEGATIVE 09/04/2019    GLUCOSEU NEGATIVE 09/04/2019       Intake & Output:  I/O last 3 completed shifts:   In: 6127 [P.O.:1820]  Out: 500 [Urine:500]  I/O this shift:  In: -   Out: 100 [Urine:100]      Radiology:            DVT prophylaxis: Lovenox    Code Status: Full Code      Disposition:Home    Active Hospital Problems    Diagnosis Date Noted    Hypopituitary dwarfism ([facility] [address] [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 [address] and LH along with TSH and free T4 is low estradiol started -by endocrinologist on 9/6 .levothyroxine added by endocrinologist  Received a one-time dose of cosyntropin 9/7-    Solu-Cortef added IV by endocrinologist 9/7    Acute metabolic acidosis likely secondary to starvation ketosis and mild rhabdomyolysis-improving with IV fluid continue to monitor [id number] to monitor      PT OT plan for rehab  Disposition per endocrinologist    Clinically improving planning for inpatient rehab versus nursing home    Next option in shelter home which patient is agreeable  Thank you No primary care provider on file. for the opportunity to be involved in this patient's care.     Electronically signed by Annita Corrales MD on 9/10/2019 at 6:09 PM

## 2019-09-10 NOTE — CARE COORDINATION
9/10/19, 1:47 PM    DISCHARGE BARRIERS  Spoke with Isabelle Bloch and she would like to go to an ECF at discharge. She is agreeable to [address] [place] [address] as they take most Medicaid pending cases. She would like to go for rehab. SW called and left a message for Jose Nowak in admissions at the facility. Update 2:05pm: [facility] Update 4:07pm: Isabelle Bloch has been accepted to [address] [place] [address] at discharge.

## 2019-09-11 VITALS
BODY MASS INDEX: 24.92 KG/M2 | TEMPERATURE: 97.8 F | DIASTOLIC BLOOD PRESSURE: 59 MMHG | RESPIRATION RATE: 16 BRPM | HEART RATE: 90 BPM | WEIGHT: 135.4 LBS | SYSTOLIC BLOOD PRESSURE: 88 MMHG | OXYGEN SATURATION: 98 % | HEIGHT: 62 IN

## 2019-09-11 LAB
ANION GAP SERPL CALCULATED.3IONS-SCNC: 12 MEQ/L (ref 8–16)
BASOPHILS # BLD: 0.4 %
BASOPHILS ABSOLUTE: 0 THOU/MM3 (ref 0–0.1)
BUN BLDV-MCNC: 8 MG/DL (ref 7–22)
CALCIUM SERPL-MCNC: 8.5 MG/DL (ref 8.5–10.5)
CHLORIDE BLD-SCNC: 106 MEQ/L (ref 98–111)
CO2: 27 MEQ/L (ref 23–33)
CREAT SERPL-MCNC: 0.6 MG/DL (ref 0.4–1.2)
EOSINOPHIL # BLD: 1.2 %
EOSINOPHILS ABSOLUTE: 0.1 THOU/MM3 (ref 0–0.4)
ERYTHROCYTE [DISTWIDTH] IN BLOOD BY AUTOMATED COUNT: 14.1 % (ref 11.5–14.5)
ERYTHROCYTE [DISTWIDTH] IN BLOOD BY AUTOMATED COUNT: 50.9 FL (ref 35–45)
GFR SERPL CREATININE-BSD FRML MDRD: > 90 ML/MIN/1.73M2
GLUCOSE BLD-MCNC: 72 MG/DL (ref 70–108)
GLUCOSE BLD-MCNC: 73 MG/DL (ref 70–108)
GLUCOSE BLD-MCNC: 99 MG/DL (ref 70–108)
HCT VFR BLD CALC: 32.4 % (ref 37–47)
HEMOGLOBIN: 10.2 GM/DL (ref 12–16)
IMMATURE GRANS (ABS): 0.03 THOU/MM3 (ref 0–0.07)
IMMATURE GRANULOCYTES: 0 %
LYMPHOCYTES # BLD: 43.4 %
LYMPHOCYTES ABSOLUTE: 3.2 THOU/MM3 (ref 1–4.8)
MCH RBC QN AUTO: 31.2 PG (ref 26–33)
MCHC RBC AUTO-ENTMCNC: 31.5 GM/DL (ref 32.2–35.5)
MCV RBC AUTO: 99.1 FL (ref 81–99)
MONOCYTES # BLD: 8.1 %
MONOCYTES ABSOLUTE: 0.6 THOU/MM3 (ref 0.4–1.3)
NUCLEATED RED BLOOD CELLS: 0 /100 WBC
PLATELET # BLD: 309 THOU/MM3 (ref 130–400)
PMV BLD AUTO: 9.9 FL (ref 9.4–12.4)
POTASSIUM SERPL-SCNC: 3.5 MEQ/L (ref 3.5–5.2)
RBC # BLD: 3.27 MILL/MM3 (ref 4.2–5.4)
SEG NEUTROPHILS: 46.5 %
SEGMENTED NEUTROPHILS ABSOLUTE COUNT: 3.4 THOU/MM3 (ref 1.8–7.7)
SODIUM BLD-SCNC: 145 MEQ/L (ref 135–145)
WBC # BLD: 7.3 THOU/MM3 (ref 4.8–10.8)

## 2019-09-11 PROCEDURE — 97116 GAIT TRAINING THERAPY: CPT

## 2019-09-11 PROCEDURE — 85025 COMPLETE CBC W/AUTO DIFF WBC: CPT

## 2019-09-11 PROCEDURE — 2709999900 HC NON-CHARGEABLE SUPPLY

## 2019-09-11 PROCEDURE — 99239 HOSP IP/OBS DSCHRG MGMT >30: CPT | Performed by: HOSPITALIST

## 2019-09-11 PROCEDURE — 97110 THERAPEUTIC EXERCISES: CPT

## 2019-09-11 PROCEDURE — 36415 COLL VENOUS BLD VENIPUNCTURE: CPT

## 2019-09-11 PROCEDURE — 97530 THERAPEUTIC ACTIVITIES: CPT

## 2019-09-11 PROCEDURE — 6370000000 HC RX 637 (ALT 250 FOR IP): Performed by: INTERNAL MEDICINE

## 2019-09-11 PROCEDURE — 82397 CHEMILUMINESCENT ASSAY: CPT

## 2019-09-11 PROCEDURE — 80048 BASIC METABOLIC PNL TOTAL CA: CPT

## 2019-09-11 PROCEDURE — 82948 REAGENT STRIP/BLOOD GLUCOSE: CPT

## 2019-09-11 PROCEDURE — 97535 SELF CARE MNGMENT TRAINING: CPT

## 2019-09-11 RX ORDER — ESTRADIOL 1 MG/1
1 TABLET ORAL DAILY
Qty: 21 TABLET | Refills: 1 | Status: ON HOLD | OUTPATIENT
Start: 2019-09-12 | End: 2019-12-17

## 2019-09-11 RX ORDER — LEVOTHYROXINE SODIUM 0.1 MG/1
100 TABLET ORAL DAILY
Qty: 30 TABLET | Refills: 1 | Status: SHIPPED | OUTPATIENT
Start: 2019-09-12

## 2019-09-11 RX ORDER — FLUDROCORTISONE ACETATE 0.1 MG/1
0.1 TABLET ORAL 2 TIMES DAILY
Qty: 60 TABLET | Refills: 1 | Status: ON HOLD | OUTPATIENT
Start: 2019-09-11 | End: 2019-09-30 | Stop reason: SDUPTHER

## 2019-09-11 RX ORDER — ERGOCALCIFEROL (VITAMIN D2) 1250 MCG
50000 CAPSULE ORAL
Qty: 5 CAPSULE | Refills: 1 | Status: SHIPPED | OUTPATIENT
Start: 2019-09-11 | End: 2020-04-26

## 2019-09-11 RX ORDER — MEDROXYPROGESTERONE ACETATE 2.5 MG/1
2.5 TABLET ORAL DAILY
Qty: 30 TABLET | Refills: 1 | Status: ON HOLD | OUTPATIENT
Start: 2019-09-12 | End: 2019-12-17

## 2019-09-11 RX ADMIN — ESTRADIOL 1 MG: 1 TABLET ORAL at 09:33

## 2019-09-11 RX ADMIN — LEVOTHYROXINE SODIUM 100 MCG: 100 TABLET ORAL at 05:47

## 2019-09-11 RX ADMIN — HYDROCORTISONE 10 MG: 10 TABLET ORAL at 09:32

## 2019-09-11 RX ADMIN — HYDROCORTISONE 5 MG: 10 TABLET ORAL at 13:08

## 2019-09-11 RX ADMIN — MEDROXYPROGESTERONE ACETATE 2.5 MG: 2.5 TABLET ORAL at 09:32

## 2019-09-11 RX ADMIN — FLUDROCORTISONE ACETATE 100 MCG: 0.1 TABLET ORAL at 09:32

## 2019-09-11 ASSESSMENT — PAIN SCALES - GENERAL
PAINLEVEL_OUTOF10: 0
PAINLEVEL_OUTOF10: 0

## 2019-09-11 NOTE — PROGRESS NOTES
This RN called Dr. Isabela Bailey and updated him of the fact that the hospitalist would like to discharge. Dr Isabela Bailey states she has an appointment next week Tuesday at his office. This RN then proceeded to call his office. The office staff state that she has no appointment made for next week Tuesday, (9/17/2019) and that Dr. Isabela Bailey has no more available appointments that day. The office staff state that the next available appointment is September 17th, 2019 at 7:20 AM.  This RN told the office staff the patient is being dischaged to [facility]. This RN explained that it is the new name of [address] [place] [address] and [facility] and this RN gave them the phone number.

## 2019-09-13 LAB — IGF BINDING PROTEIN-3: 1400 NG/ML (ref 2562–5596)

## 2019-09-28 ENCOUNTER — HOSPITAL ENCOUNTER (OUTPATIENT)
Age: 54
Setting detail: OBSERVATION
Discharge: OTHER INSTITUTION | End: 2019-09-30
Attending: INTERNAL MEDICINE | Admitting: INTERNAL MEDICINE
Payer: MEDICAID

## 2019-09-28 ENCOUNTER — APPOINTMENT (OUTPATIENT)
Dept: GENERAL RADIOLOGY | Age: 54
End: 2019-09-28
Payer: MEDICAID

## 2019-09-28 DIAGNOSIS — E87.6 HYPOKALEMIA: Primary | ICD-10-CM

## 2019-09-28 LAB
ALBUMIN SERPL-MCNC: 4 G/DL (ref 3.5–5.1)
ALP BLD-CCNC: 60 U/L (ref 38–126)
ALT SERPL-CCNC: 7 U/L (ref 11–66)
ANION GAP SERPL CALCULATED.3IONS-SCNC: 14 MEQ/L (ref 8–16)
AST SERPL-CCNC: 16 U/L (ref 5–40)
BASOPHILS # BLD: 0.7 %
BASOPHILS ABSOLUTE: 0 THOU/MM3 (ref 0–0.1)
BILIRUB SERPL-MCNC: 0.2 MG/DL (ref 0.3–1.2)
BILIRUBIN DIRECT: < 0.2 MG/DL (ref 0–0.3)
BUN BLDV-MCNC: 8 MG/DL (ref 7–22)
CALCIUM SERPL-MCNC: 9 MG/DL (ref 8.5–10.5)
CHLORIDE BLD-SCNC: 106 MEQ/L (ref 98–111)
CO2: 27 MEQ/L (ref 23–33)
CREAT SERPL-MCNC: 0.7 MG/DL (ref 0.4–1.2)
EOSINOPHIL # BLD: 1.8 %
EOSINOPHILS ABSOLUTE: 0.1 THOU/MM3 (ref 0–0.4)
ERYTHROCYTE [DISTWIDTH] IN BLOOD BY AUTOMATED COUNT: 15.1 % (ref 11.5–14.5)
ERYTHROCYTE [DISTWIDTH] IN BLOOD BY AUTOMATED COUNT: 55.2 FL (ref 35–45)
GFR SERPL CREATININE-BSD FRML MDRD: 87 ML/MIN/1.73M2
GLUCOSE BLD-MCNC: 94 MG/DL (ref 70–108)
HCT VFR BLD CALC: 34.2 % (ref 37–47)
HEMOGLOBIN: 10.8 GM/DL (ref 12–16)
IMMATURE GRANS (ABS): 0.03 THOU/MM3 (ref 0–0.07)
IMMATURE GRANULOCYTES: 0.4 %
LYMPHOCYTES # BLD: 44.2 %
LYMPHOCYTES ABSOLUTE: 3 THOU/MM3 (ref 1–4.8)
MAGNESIUM: 2.1 MG/DL (ref 1.6–2.4)
MCH RBC QN AUTO: 31.7 PG (ref 26–33)
MCHC RBC AUTO-ENTMCNC: 31.6 GM/DL (ref 32.2–35.5)
MCV RBC AUTO: 100.3 FL (ref 81–99)
MONOCYTES # BLD: 6.1 %
MONOCYTES ABSOLUTE: 0.4 THOU/MM3 (ref 0.4–1.3)
NUCLEATED RED BLOOD CELLS: 0 /100 WBC
OSMOLALITY CALCULATION: 290.5 MOSMOL/KG (ref 275–300)
PLATELET # BLD: 273 THOU/MM3 (ref 130–400)
PMV BLD AUTO: 10.2 FL (ref 9.4–12.4)
POTASSIUM REFLEX MAGNESIUM: 2.8 MEQ/L (ref 3.5–5.2)
PRO-BNP: 2641 PG/ML (ref 0–900)
RBC # BLD: 3.41 MILL/MM3 (ref 4.2–5.4)
SEG NEUTROPHILS: 46.8 %
SEGMENTED NEUTROPHILS ABSOLUTE COUNT: 3.1 THOU/MM3 (ref 1.8–7.7)
SODIUM BLD-SCNC: 147 MEQ/L (ref 135–145)
TOTAL CK: 83 U/L (ref 30–135)
TOTAL PROTEIN: 6.7 G/DL (ref 6.1–8)
TROPONIN T: < 0.01 NG/ML
WBC # BLD: 6.7 THOU/MM3 (ref 4.8–10.8)

## 2019-09-28 PROCEDURE — 80048 BASIC METABOLIC PNL TOTAL CA: CPT

## 2019-09-28 PROCEDURE — 36415 COLL VENOUS BLD VENIPUNCTURE: CPT

## 2019-09-28 PROCEDURE — 6370000000 HC RX 637 (ALT 250 FOR IP): Performed by: INTERNAL MEDICINE

## 2019-09-28 PROCEDURE — 83735 ASSAY OF MAGNESIUM: CPT

## 2019-09-28 PROCEDURE — G0378 HOSPITAL OBSERVATION PER HR: HCPCS

## 2019-09-28 PROCEDURE — 82550 ASSAY OF CK (CPK): CPT

## 2019-09-28 PROCEDURE — 85025 COMPLETE CBC W/AUTO DIFF WBC: CPT

## 2019-09-28 PROCEDURE — 83880 ASSAY OF NATRIURETIC PEPTIDE: CPT

## 2019-09-28 PROCEDURE — 84484 ASSAY OF TROPONIN QUANT: CPT

## 2019-09-28 PROCEDURE — 2580000003 HC RX 258: Performed by: INTERNAL MEDICINE

## 2019-09-28 PROCEDURE — 99285 EMERGENCY DEPT VISIT HI MDM: CPT

## 2019-09-28 PROCEDURE — 80076 HEPATIC FUNCTION PANEL: CPT

## 2019-09-28 PROCEDURE — 71046 X-RAY EXAM CHEST 2 VIEWS: CPT

## 2019-09-28 PROCEDURE — 93005 ELECTROCARDIOGRAM TRACING: CPT | Performed by: NURSE PRACTITIONER

## 2019-09-28 RX ORDER — SODIUM CHLORIDE 0.9 % (FLUSH) 0.9 %
10 SYRINGE (ML) INJECTION EVERY 12 HOURS SCHEDULED
Status: DISCONTINUED | OUTPATIENT
Start: 2019-09-28 | End: 2019-09-30 | Stop reason: HOSPADM

## 2019-09-28 RX ORDER — LEVOTHYROXINE SODIUM 0.1 MG/1
100 TABLET ORAL DAILY
Status: DISCONTINUED | OUTPATIENT
Start: 2019-09-29 | End: 2019-09-30 | Stop reason: HOSPADM

## 2019-09-28 RX ORDER — MEDROXYPROGESTERONE ACETATE 2.5 MG/1
2.5 TABLET ORAL DAILY
Status: DISCONTINUED | OUTPATIENT
Start: 2019-09-29 | End: 2019-09-30 | Stop reason: HOSPADM

## 2019-09-28 RX ORDER — POTASSIUM CHLORIDE 750 MG/1
40 TABLET, FILM COATED, EXTENDED RELEASE ORAL ONCE
Status: COMPLETED | OUTPATIENT
Start: 2019-09-28 | End: 2019-09-28

## 2019-09-28 RX ORDER — POTASSIUM CHLORIDE 20 MEQ/1
10 TABLET, EXTENDED RELEASE ORAL 2 TIMES DAILY
Status: DISCONTINUED | OUTPATIENT
Start: 2019-09-28 | End: 2019-09-30 | Stop reason: HOSPADM

## 2019-09-28 RX ORDER — HYDROCORTISONE 5 MG/1
5 TABLET ORAL DAILY
Status: DISCONTINUED | OUTPATIENT
Start: 2019-09-29 | End: 2019-09-30 | Stop reason: HOSPADM

## 2019-09-28 RX ORDER — FLUDROCORTISONE ACETATE 0.1 MG/1
0.1 TABLET ORAL 2 TIMES DAILY
Status: DISCONTINUED | OUTPATIENT
Start: 2019-09-28 | End: 2019-09-30 | Stop reason: HOSPADM

## 2019-09-28 RX ORDER — ERGOCALCIFEROL 1.25 MG/1
50000 CAPSULE ORAL
Status: DISCONTINUED | OUTPATIENT
Start: 2019-09-30 | End: 2019-09-30 | Stop reason: HOSPADM

## 2019-09-28 RX ORDER — ONDANSETRON 2 MG/ML
4 INJECTION INTRAMUSCULAR; INTRAVENOUS EVERY 6 HOURS PRN
Status: DISCONTINUED | OUTPATIENT
Start: 2019-09-28 | End: 2019-09-30 | Stop reason: HOSPADM

## 2019-09-28 RX ORDER — SODIUM CHLORIDE 0.9 % (FLUSH) 0.9 %
10 SYRINGE (ML) INJECTION PRN
Status: DISCONTINUED | OUTPATIENT
Start: 2019-09-28 | End: 2019-09-30 | Stop reason: HOSPADM

## 2019-09-28 RX ORDER — POTASSIUM CHLORIDE 20 MEQ/1
40 TABLET, EXTENDED RELEASE ORAL PRN
Status: DISCONTINUED | OUTPATIENT
Start: 2019-09-28 | End: 2019-09-30 | Stop reason: HOSPADM

## 2019-09-28 RX ORDER — ACETAMINOPHEN 325 MG/1
650 TABLET ORAL EVERY 4 HOURS PRN
Status: DISCONTINUED | OUTPATIENT
Start: 2019-09-28 | End: 2019-09-30 | Stop reason: HOSPADM

## 2019-09-28 RX ORDER — ESTRADIOL 1 MG/1
1 TABLET ORAL DAILY
Status: DISCONTINUED | OUTPATIENT
Start: 2019-09-29 | End: 2019-09-30 | Stop reason: HOSPADM

## 2019-09-28 RX ORDER — POTASSIUM CHLORIDE 7.45 MG/ML
10 INJECTION INTRAVENOUS PRN
Status: DISCONTINUED | OUTPATIENT
Start: 2019-09-28 | End: 2019-09-30 | Stop reason: HOSPADM

## 2019-09-28 RX ADMIN — SODIUM CHLORIDE, PRESERVATIVE FREE 10 ML: 5 INJECTION INTRAVENOUS at 23:10

## 2019-09-28 RX ADMIN — POTASSIUM CHLORIDE 10 MEQ: 1500 TABLET, EXTENDED RELEASE ORAL at 23:10

## 2019-09-28 RX ADMIN — POTASSIUM CHLORIDE 40 MEQ: 750 TABLET, FILM COATED, EXTENDED RELEASE ORAL at 17:33

## 2019-09-28 RX ADMIN — FLUDROCORTISONE ACETATE 0.1 MG: 0.1 TABLET ORAL at 23:33

## 2019-09-28 ASSESSMENT — ENCOUNTER SYMPTOMS
EYE DISCHARGE: 0
ABDOMINAL PAIN: 0
DIARRHEA: 0
VOMITING: 0
NAUSEA: 0
EYE PAIN: 0
SORE THROAT: 0
COUGH: 0
SHORTNESS OF BREATH: 0
BACK PAIN: 0
RHINORRHEA: 0
WHEEZING: 0

## 2019-09-28 ASSESSMENT — PAIN SCALES - GENERAL
PAINLEVEL_OUTOF10: 0
PAINLEVEL_OUTOF10: 0

## 2019-09-29 PROBLEM — M79.89 SWELLING OF LOWER EXTREMITY: Status: ACTIVE | Noted: 2019-09-29

## 2019-09-29 LAB
ANION GAP SERPL CALCULATED.3IONS-SCNC: 11 MEQ/L (ref 8–16)
BUN BLDV-MCNC: 7 MG/DL (ref 7–22)
CALCIUM SERPL-MCNC: 8.2 MG/DL (ref 8.5–10.5)
CHLORIDE BLD-SCNC: 110 MEQ/L (ref 98–111)
CO2: 27 MEQ/L (ref 23–33)
CREAT SERPL-MCNC: 0.7 MG/DL (ref 0.4–1.2)
ERYTHROCYTE [DISTWIDTH] IN BLOOD BY AUTOMATED COUNT: 15.2 % (ref 11.5–14.5)
ERYTHROCYTE [DISTWIDTH] IN BLOOD BY AUTOMATED COUNT: 55.4 FL (ref 35–45)
GFR SERPL CREATININE-BSD FRML MDRD: 87 ML/MIN/1.73M2
GLUCOSE BLD-MCNC: 73 MG/DL (ref 70–108)
HCT VFR BLD CALC: 28.4 % (ref 37–47)
HEMOGLOBIN: 9 GM/DL (ref 12–16)
MAGNESIUM: 2.1 MG/DL (ref 1.6–2.4)
MCH RBC QN AUTO: 31.6 PG (ref 26–33)
MCHC RBC AUTO-ENTMCNC: 31.7 GM/DL (ref 32.2–35.5)
MCV RBC AUTO: 99.6 FL (ref 81–99)
PLATELET # BLD: 216 THOU/MM3 (ref 130–400)
PMV BLD AUTO: 10.3 FL (ref 9.4–12.4)
POTASSIUM REFLEX MAGNESIUM: 2.9 MEQ/L (ref 3.5–5.2)
POTASSIUM SERPL-SCNC: 3.6 MEQ/L (ref 3.5–5.2)
RBC # BLD: 2.85 MILL/MM3 (ref 4.2–5.4)
SODIUM BLD-SCNC: 148 MEQ/L (ref 135–145)
WBC # BLD: 5.5 THOU/MM3 (ref 4.8–10.8)

## 2019-09-29 PROCEDURE — 6370000000 HC RX 637 (ALT 250 FOR IP): Performed by: INTERNAL MEDICINE

## 2019-09-29 PROCEDURE — 2709999900 HC NON-CHARGEABLE SUPPLY

## 2019-09-29 PROCEDURE — 83735 ASSAY OF MAGNESIUM: CPT

## 2019-09-29 PROCEDURE — 99225 PR SBSQ OBSERVATION CARE/DAY 25 MINUTES: CPT | Performed by: INTERNAL MEDICINE

## 2019-09-29 PROCEDURE — 2580000003 HC RX 258: Performed by: INTERNAL MEDICINE

## 2019-09-29 PROCEDURE — 96376 TX/PRO/DX INJ SAME DRUG ADON: CPT

## 2019-09-29 PROCEDURE — 84132 ASSAY OF SERUM POTASSIUM: CPT

## 2019-09-29 PROCEDURE — 96374 THER/PROPH/DIAG INJ IV PUSH: CPT

## 2019-09-29 PROCEDURE — 36415 COLL VENOUS BLD VENIPUNCTURE: CPT

## 2019-09-29 PROCEDURE — G0378 HOSPITAL OBSERVATION PER HR: HCPCS

## 2019-09-29 PROCEDURE — 85027 COMPLETE CBC AUTOMATED: CPT

## 2019-09-29 PROCEDURE — 94760 N-INVAS EAR/PLS OXIMETRY 1: CPT

## 2019-09-29 PROCEDURE — 6360000002 HC RX W HCPCS: Performed by: INTERNAL MEDICINE

## 2019-09-29 PROCEDURE — 80048 BASIC METABOLIC PNL TOTAL CA: CPT

## 2019-09-29 RX ORDER — POTASSIUM CHLORIDE 7.45 MG/ML
10 INJECTION INTRAVENOUS
Status: COMPLETED | OUTPATIENT
Start: 2019-09-29 | End: 2019-09-29

## 2019-09-29 RX ADMIN — ESTRADIOL 1 MG: 1 TABLET ORAL at 09:07

## 2019-09-29 RX ADMIN — POTASSIUM CHLORIDE 10 MEQ: 7.46 INJECTION, SOLUTION INTRAVENOUS at 13:55

## 2019-09-29 RX ADMIN — POTASSIUM CHLORIDE 10 MEQ: 7.46 INJECTION, SOLUTION INTRAVENOUS at 06:51

## 2019-09-29 RX ADMIN — MEDROXYPROGESTERONE ACETATE 2.5 MG: 2.5 TABLET ORAL at 09:07

## 2019-09-29 RX ADMIN — ACETAMINOPHEN 650 MG: 325 TABLET ORAL at 20:23

## 2019-09-29 RX ADMIN — POTASSIUM CHLORIDE 10 MEQ: 7.46 INJECTION, SOLUTION INTRAVENOUS at 15:10

## 2019-09-29 RX ADMIN — LEVOTHYROXINE SODIUM 100 MCG: 100 TABLET ORAL at 06:53

## 2019-09-29 RX ADMIN — FLUDROCORTISONE ACETATE 0.1 MG: 0.1 TABLET ORAL at 20:21

## 2019-09-29 RX ADMIN — FLUDROCORTISONE ACETATE 0.1 MG: 0.1 TABLET ORAL at 09:07

## 2019-09-29 RX ADMIN — HYDROCORTISONE 5 MG: 5 TABLET ORAL at 09:06

## 2019-09-29 RX ADMIN — POTASSIUM CHLORIDE 10 MEQ: 7.46 INJECTION, SOLUTION INTRAVENOUS at 09:50

## 2019-09-29 RX ADMIN — POTASSIUM CHLORIDE 10 MEQ: 1500 TABLET, EXTENDED RELEASE ORAL at 20:20

## 2019-09-29 RX ADMIN — POTASSIUM CHLORIDE 10 MEQ: 7.46 INJECTION, SOLUTION INTRAVENOUS at 17:30

## 2019-09-29 RX ADMIN — POTASSIUM CHLORIDE 10 MEQ: 1500 TABLET, EXTENDED RELEASE ORAL at 10:18

## 2019-09-29 RX ADMIN — POTASSIUM CHLORIDE 10 MEQ: 7.46 INJECTION, SOLUTION INTRAVENOUS at 08:18

## 2019-09-29 RX ADMIN — SODIUM CHLORIDE, PRESERVATIVE FREE 10 ML: 5 INJECTION INTRAVENOUS at 20:20

## 2019-09-29 ASSESSMENT — PAIN SCALES - GENERAL
PAINLEVEL_OUTOF10: 0

## 2019-09-30 VITALS
BODY MASS INDEX: 26.06 KG/M2 | HEART RATE: 74 BPM | SYSTOLIC BLOOD PRESSURE: 132 MMHG | WEIGHT: 141.6 LBS | TEMPERATURE: 98.3 F | RESPIRATION RATE: 16 BRPM | OXYGEN SATURATION: 92 % | HEIGHT: 62 IN | DIASTOLIC BLOOD PRESSURE: 86 MMHG

## 2019-09-30 LAB
ANION GAP SERPL CALCULATED.3IONS-SCNC: 8 MEQ/L (ref 8–16)
BUN BLDV-MCNC: 10 MG/DL (ref 7–22)
CALCIUM SERPL-MCNC: 8.6 MG/DL (ref 8.5–10.5)
CHLORIDE BLD-SCNC: 112 MEQ/L (ref 98–111)
CO2: 27 MEQ/L (ref 23–33)
CREAT SERPL-MCNC: 0.7 MG/DL (ref 0.4–1.2)
EKG ATRIAL RATE: 62 BPM
EKG P AXIS: 75 DEGREES
EKG P-R INTERVAL: 180 MS
EKG Q-T INTERVAL: 410 MS
EKG QRS DURATION: 76 MS
EKG QTC CALCULATION (BAZETT): 416 MS
EKG R AXIS: 46 DEGREES
EKG T AXIS: 62 DEGREES
EKG VENTRICULAR RATE: 62 BPM
ERYTHROCYTE [DISTWIDTH] IN BLOOD BY AUTOMATED COUNT: 15.1 % (ref 11.5–14.5)
ERYTHROCYTE [DISTWIDTH] IN BLOOD BY AUTOMATED COUNT: 55.9 FL (ref 35–45)
GFR SERPL CREATININE-BSD FRML MDRD: 87 ML/MIN/1.73M2
GLUCOSE BLD-MCNC: 74 MG/DL (ref 70–108)
HCT VFR BLD CALC: 32.4 % (ref 37–47)
HEMOGLOBIN: 9.9 GM/DL (ref 12–16)
LV EF: 55 %
LVEF MODALITY: NORMAL
MCH RBC QN AUTO: 30.9 PG (ref 26–33)
MCHC RBC AUTO-ENTMCNC: 30.6 GM/DL (ref 32.2–35.5)
MCV RBC AUTO: 101.3 FL (ref 81–99)
PLATELET # BLD: 238 THOU/MM3 (ref 130–400)
PMV BLD AUTO: 10.5 FL (ref 9.4–12.4)
POTASSIUM REFLEX MAGNESIUM: 3.9 MEQ/L (ref 3.5–5.2)
RBC # BLD: 3.2 MILL/MM3 (ref 4.2–5.4)
SODIUM BLD-SCNC: 147 MEQ/L (ref 135–145)
WBC # BLD: 5.8 THOU/MM3 (ref 4.8–10.8)

## 2019-09-30 PROCEDURE — G0378 HOSPITAL OBSERVATION PER HR: HCPCS

## 2019-09-30 PROCEDURE — 85027 COMPLETE CBC AUTOMATED: CPT

## 2019-09-30 PROCEDURE — 2580000003 HC RX 258: Performed by: INTERNAL MEDICINE

## 2019-09-30 PROCEDURE — 93306 TTE W/DOPPLER COMPLETE: CPT

## 2019-09-30 PROCEDURE — 94760 N-INVAS EAR/PLS OXIMETRY 1: CPT

## 2019-09-30 PROCEDURE — 93010 ELECTROCARDIOGRAM REPORT: CPT | Performed by: INTERNAL MEDICINE

## 2019-09-30 PROCEDURE — 80048 BASIC METABOLIC PNL TOTAL CA: CPT

## 2019-09-30 PROCEDURE — 99217 PR OBSERVATION CARE DISCHARGE MANAGEMENT: CPT | Performed by: INTERNAL MEDICINE

## 2019-09-30 PROCEDURE — 36415 COLL VENOUS BLD VENIPUNCTURE: CPT

## 2019-09-30 PROCEDURE — 6370000000 HC RX 637 (ALT 250 FOR IP): Performed by: INTERNAL MEDICINE

## 2019-09-30 RX ORDER — FLUDROCORTISONE ACETATE 0.1 MG/1
0.1 TABLET ORAL 2 TIMES DAILY
Qty: 60 TABLET | Refills: 0 | Status: SHIPPED | OUTPATIENT
Start: 2019-09-30 | End: 2019-10-30

## 2019-09-30 RX ADMIN — ESTRADIOL 1 MG: 1 TABLET ORAL at 10:24

## 2019-09-30 RX ADMIN — FLUDROCORTISONE ACETATE 0.1 MG: 0.1 TABLET ORAL at 10:24

## 2019-09-30 RX ADMIN — MEDROXYPROGESTERONE ACETATE 2.5 MG: 2.5 TABLET ORAL at 10:24

## 2019-09-30 RX ADMIN — ERGOCALCIFEROL 50000 UNITS: 1.25 CAPSULE ORAL at 10:24

## 2019-09-30 RX ADMIN — LEVOTHYROXINE SODIUM 100 MCG: 100 TABLET ORAL at 06:52

## 2019-09-30 RX ADMIN — SODIUM CHLORIDE, PRESERVATIVE FREE 10 ML: 5 INJECTION INTRAVENOUS at 10:29

## 2019-09-30 RX ADMIN — HYDROCORTISONE 5 MG: 5 TABLET ORAL at 10:24

## 2019-09-30 RX ADMIN — POTASSIUM CHLORIDE 10 MEQ: 1500 TABLET, EXTENDED RELEASE ORAL at 10:29

## 2019-09-30 ASSESSMENT — PAIN SCALES - GENERAL
PAINLEVEL_OUTOF10: 0

## 2019-11-19 ENCOUNTER — TELEPHONE (OUTPATIENT)
Dept: FAMILY MEDICINE CLINIC | Age: 54
End: 2019-11-19

## 2019-11-20 ENCOUNTER — HOSPITAL ENCOUNTER (EMERGENCY)
Age: 54
Discharge: HOME | End: 2019-11-20
Payer: MEDICAID

## 2019-11-20 VITALS
RESPIRATION RATE: 18 BRPM | HEART RATE: 66 BPM | WEIGHT: 120 LBS | HEIGHT: 62 IN | OXYGEN SATURATION: 99 % | DIASTOLIC BLOOD PRESSURE: 80 MMHG | TEMPERATURE: 98.3 F | SYSTOLIC BLOOD PRESSURE: 138 MMHG | BODY MASS INDEX: 22.08 KG/M2

## 2019-11-20 DIAGNOSIS — Z01.89 ENCOUNTER FOR LABORATORY TEST: Primary | ICD-10-CM

## 2019-11-20 LAB
CHOLESTEROL, TOTAL: 209 MG/DL (ref 100–199)
HDLC SERPL-MCNC: 49 MG/DL
LDL CHOLESTEROL CALCULATED: 135 MG/DL
TRIGL SERPL-MCNC: 125 MG/DL (ref 0–199)

## 2019-11-20 PROCEDURE — 99282 EMERGENCY DEPT VISIT SF MDM: CPT

## 2019-11-20 PROCEDURE — 80061 LIPID PANEL: CPT

## 2019-11-20 PROCEDURE — 36415 COLL VENOUS BLD VENIPUNCTURE: CPT

## 2019-11-20 RX ORDER — ATORVASTATIN CALCIUM 40 MG/1
1 TABLET, FILM COATED ORAL DAILY
COMMUNITY
End: 2020-01-16 | Stop reason: SDUPTHER

## 2019-11-20 ASSESSMENT — ENCOUNTER SYMPTOMS: SHORTNESS OF BREATH: 0

## 2019-12-17 ENCOUNTER — HOSPITAL ENCOUNTER (OUTPATIENT)
Age: 54
Setting detail: OBSERVATION
Discharge: HOME | End: 2019-12-18
Attending: INTERNAL MEDICINE | Admitting: INTERNAL MEDICINE
Payer: MEDICAID

## 2019-12-17 ENCOUNTER — APPOINTMENT (OUTPATIENT)
Dept: CT IMAGING | Age: 54
End: 2019-12-17
Payer: MEDICAID

## 2019-12-17 DIAGNOSIS — R94.31 ABNORMAL EKG: ICD-10-CM

## 2019-12-17 DIAGNOSIS — R06.02 SHORTNESS OF BREATH: Primary | ICD-10-CM

## 2019-12-17 PROBLEM — R07.9 CHEST PAIN: Status: ACTIVE | Noted: 2019-12-17

## 2019-12-17 LAB
ANION GAP SERPL CALCULATED.3IONS-SCNC: 13 MEQ/L (ref 8–16)
APTT: 42.9 SECONDS (ref 22–38)
BASOPHILS # BLD: 1.1 %
BASOPHILS ABSOLUTE: 0.1 THOU/MM3 (ref 0–0.1)
BUN BLDV-MCNC: 6 MG/DL (ref 7–22)
CALCIUM SERPL-MCNC: 9.2 MG/DL (ref 8.5–10.5)
CHLORIDE BLD-SCNC: 106 MEQ/L (ref 98–111)
CO2: 21 MEQ/L (ref 23–33)
CREAT SERPL-MCNC: 0.9 MG/DL (ref 0.4–1.2)
EKG ATRIAL RATE: 84 BPM
EKG ATRIAL RATE: 97 BPM
EKG P AXIS: 60 DEGREES
EKG P AXIS: 77 DEGREES
EKG P-R INTERVAL: 154 MS
EKG P-R INTERVAL: 162 MS
EKG Q-T INTERVAL: 354 MS
EKG Q-T INTERVAL: 420 MS
EKG QRS DURATION: 78 MS
EKG QRS DURATION: 84 MS
EKG QTC CALCULATION (BAZETT): 449 MS
EKG QTC CALCULATION (BAZETT): 496 MS
EKG R AXIS: 58 DEGREES
EKG R AXIS: 63 DEGREES
EKG T AXIS: -29 DEGREES
EKG T AXIS: 73 DEGREES
EKG VENTRICULAR RATE: 84 BPM
EKG VENTRICULAR RATE: 97 BPM
EOSINOPHIL # BLD: 2.9 %
EOSINOPHILS ABSOLUTE: 0.2 THOU/MM3 (ref 0–0.4)
ERYTHROCYTE [DISTWIDTH] IN BLOOD BY AUTOMATED COUNT: 13.3 % (ref 11.5–14.5)
ERYTHROCYTE [DISTWIDTH] IN BLOOD BY AUTOMATED COUNT: 13.3 % (ref 11.5–14.5)
ERYTHROCYTE [DISTWIDTH] IN BLOOD BY AUTOMATED COUNT: 45.1 FL (ref 35–45)
ERYTHROCYTE [DISTWIDTH] IN BLOOD BY AUTOMATED COUNT: 45.2 FL (ref 35–45)
GFR SERPL CREATININE-BSD FRML MDRD: 65 ML/MIN/1.73M2
GLUCOSE BLD-MCNC: 109 MG/DL (ref 70–108)
HCT VFR BLD CALC: 42 % (ref 37–47)
HCT VFR BLD CALC: 44.1 % (ref 37–47)
HEMOGLOBIN: 13.6 GM/DL (ref 12–16)
HEMOGLOBIN: 14.3 GM/DL (ref 12–16)
IMMATURE GRANS (ABS): 0.03 THOU/MM3 (ref 0–0.07)
IMMATURE GRANULOCYTES: 0.4 %
LYMPHOCYTES # BLD: 40.5 %
LYMPHOCYTES ABSOLUTE: 3.3 THOU/MM3 (ref 1–4.8)
MAGNESIUM: 1.9 MG/DL (ref 1.6–2.4)
MCH RBC QN AUTO: 30.2 PG (ref 26–33)
MCH RBC QN AUTO: 30.3 PG (ref 26–33)
MCHC RBC AUTO-ENTMCNC: 32.4 GM/DL (ref 32.2–35.5)
MCHC RBC AUTO-ENTMCNC: 32.4 GM/DL (ref 32.2–35.5)
MCV RBC AUTO: 93.1 FL (ref 81–99)
MCV RBC AUTO: 93.4 FL (ref 81–99)
MONOCYTES # BLD: 6 %
MONOCYTES ABSOLUTE: 0.5 THOU/MM3 (ref 0.4–1.3)
NUCLEATED RED BLOOD CELLS: 0 /100 WBC
OSMOLALITY CALCULATION: 277.6 MOSMOL/KG (ref 275–300)
PLATELET # BLD: 248 THOU/MM3 (ref 130–400)
PLATELET # BLD: 264 THOU/MM3 (ref 130–400)
PMV BLD AUTO: 10.1 FL (ref 9.4–12.4)
PMV BLD AUTO: 9.8 FL (ref 9.4–12.4)
POTASSIUM SERPL-SCNC: 3.7 MEQ/L (ref 3.5–5.2)
PRO-BNP: 95.5 PG/ML (ref 0–900)
RBC # BLD: 4.51 MILL/MM3 (ref 4.2–5.4)
RBC # BLD: 4.72 MILL/MM3 (ref 4.2–5.4)
SEG NEUTROPHILS: 49.1 %
SEGMENTED NEUTROPHILS ABSOLUTE COUNT: 4 THOU/MM3 (ref 1.8–7.7)
SODIUM BLD-SCNC: 140 MEQ/L (ref 135–145)
TROPONIN T: < 0.01 NG/ML
WBC # BLD: 7.7 THOU/MM3 (ref 4.8–10.8)
WBC # BLD: 8.2 THOU/MM3 (ref 4.8–10.8)

## 2019-12-17 PROCEDURE — 85025 COMPLETE CBC W/AUTO DIFF WBC: CPT

## 2019-12-17 PROCEDURE — 96375 TX/PRO/DX INJ NEW DRUG ADDON: CPT

## 2019-12-17 PROCEDURE — 93005 ELECTROCARDIOGRAM TRACING: CPT | Performed by: INTERNAL MEDICINE

## 2019-12-17 PROCEDURE — 83880 ASSAY OF NATRIURETIC PEPTIDE: CPT

## 2019-12-17 PROCEDURE — 94761 N-INVAS EAR/PLS OXIMETRY MLT: CPT

## 2019-12-17 PROCEDURE — 80048 BASIC METABOLIC PNL TOTAL CA: CPT

## 2019-12-17 PROCEDURE — 6370000000 HC RX 637 (ALT 250 FOR IP): Performed by: PHYSICIAN ASSISTANT

## 2019-12-17 PROCEDURE — 96374 THER/PROPH/DIAG INJ IV PUSH: CPT

## 2019-12-17 PROCEDURE — G0378 HOSPITAL OBSERVATION PER HR: HCPCS

## 2019-12-17 PROCEDURE — 71275 CT ANGIOGRAPHY CHEST: CPT

## 2019-12-17 PROCEDURE — 94640 AIRWAY INHALATION TREATMENT: CPT

## 2019-12-17 PROCEDURE — 96376 TX/PRO/DX INJ SAME DRUG ADON: CPT

## 2019-12-17 PROCEDURE — 93005 ELECTROCARDIOGRAM TRACING: CPT | Performed by: PHYSICIAN ASSISTANT

## 2019-12-17 PROCEDURE — 6360000002 HC RX W HCPCS: Performed by: PHYSICIAN ASSISTANT

## 2019-12-17 PROCEDURE — 93010 ELECTROCARDIOGRAM REPORT: CPT | Performed by: NUCLEAR MEDICINE

## 2019-12-17 PROCEDURE — 6370000000 HC RX 637 (ALT 250 FOR IP): Performed by: INTERNAL MEDICINE

## 2019-12-17 PROCEDURE — 6360000004 HC RX CONTRAST MEDICATION: Performed by: PHYSICIAN ASSISTANT

## 2019-12-17 PROCEDURE — 6360000002 HC RX W HCPCS: Performed by: INTERNAL MEDICINE

## 2019-12-17 PROCEDURE — 85730 THROMBOPLASTIN TIME PARTIAL: CPT

## 2019-12-17 PROCEDURE — 85027 COMPLETE CBC AUTOMATED: CPT

## 2019-12-17 PROCEDURE — 99285 EMERGENCY DEPT VISIT HI MDM: CPT

## 2019-12-17 PROCEDURE — 83735 ASSAY OF MAGNESIUM: CPT

## 2019-12-17 PROCEDURE — 96366 THER/PROPH/DIAG IV INF ADDON: CPT

## 2019-12-17 PROCEDURE — 36415 COLL VENOUS BLD VENIPUNCTURE: CPT

## 2019-12-17 PROCEDURE — 2709999900 HC NON-CHARGEABLE SUPPLY

## 2019-12-17 PROCEDURE — 99220 PR INITIAL OBSERVATION CARE/DAY 70 MINUTES: CPT | Performed by: PHYSICIAN ASSISTANT

## 2019-12-17 PROCEDURE — 84484 ASSAY OF TROPONIN QUANT: CPT

## 2019-12-17 PROCEDURE — 96365 THER/PROPH/DIAG IV INF INIT: CPT

## 2019-12-17 PROCEDURE — 99245 OFF/OP CONSLTJ NEW/EST HI 55: CPT | Performed by: INTERNAL MEDICINE

## 2019-12-17 RX ORDER — HEPARIN SODIUM 1000 [USP'U]/ML
60 INJECTION, SOLUTION INTRAVENOUS; SUBCUTANEOUS PRN
Status: DISCONTINUED | OUTPATIENT
Start: 2019-12-17 | End: 2019-12-19 | Stop reason: HOSPADM

## 2019-12-17 RX ORDER — HYDROCORTISONE 5 MG/1
5 TABLET ORAL DAILY
Status: DISCONTINUED | OUTPATIENT
Start: 2019-12-17 | End: 2019-12-17

## 2019-12-17 RX ORDER — METHYLPREDNISOLONE SODIUM SUCCINATE 125 MG/2ML
125 INJECTION, POWDER, LYOPHILIZED, FOR SOLUTION INTRAMUSCULAR; INTRAVENOUS ONCE
Status: COMPLETED | OUTPATIENT
Start: 2019-12-17 | End: 2019-12-17

## 2019-12-17 RX ORDER — HYDROCORTISONE 10 MG/1
10 TABLET ORAL 2 TIMES DAILY
Status: DISCONTINUED | OUTPATIENT
Start: 2019-12-17 | End: 2019-12-19 | Stop reason: HOSPADM

## 2019-12-17 RX ORDER — HEPARIN SODIUM 10000 [USP'U]/100ML
12 INJECTION, SOLUTION INTRAVENOUS CONTINUOUS
Status: DISCONTINUED | OUTPATIENT
Start: 2019-12-17 | End: 2019-12-19 | Stop reason: HOSPADM

## 2019-12-17 RX ORDER — ONDANSETRON 2 MG/ML
4 INJECTION INTRAMUSCULAR; INTRAVENOUS EVERY 6 HOURS PRN
Status: DISCONTINUED | OUTPATIENT
Start: 2019-12-17 | End: 2019-12-19 | Stop reason: HOSPADM

## 2019-12-17 RX ORDER — SODIUM CHLORIDE 0.9 % (FLUSH) 0.9 %
10 SYRINGE (ML) INJECTION PRN
Status: DISCONTINUED | OUTPATIENT
Start: 2019-12-17 | End: 2019-12-19 | Stop reason: HOSPADM

## 2019-12-17 RX ORDER — SODIUM CHLORIDE 0.9 % (FLUSH) 0.9 %
10 SYRINGE (ML) INJECTION EVERY 12 HOURS SCHEDULED
Status: DISCONTINUED | OUTPATIENT
Start: 2019-12-17 | End: 2019-12-19 | Stop reason: HOSPADM

## 2019-12-17 RX ORDER — FLUDROCORTISONE ACETATE 0.1 MG/1
0.1 TABLET ORAL 2 TIMES DAILY
Status: DISCONTINUED | OUTPATIENT
Start: 2019-12-17 | End: 2019-12-19 | Stop reason: HOSPADM

## 2019-12-17 RX ORDER — HEPARIN SODIUM 1000 [USP'U]/ML
60 INJECTION, SOLUTION INTRAVENOUS; SUBCUTANEOUS ONCE
Status: COMPLETED | OUTPATIENT
Start: 2019-12-17 | End: 2019-12-17

## 2019-12-17 RX ORDER — NITROGLYCERIN 20 MG/100ML
5 INJECTION INTRAVENOUS CONTINUOUS
Status: DISCONTINUED | OUTPATIENT
Start: 2019-12-17 | End: 2019-12-19 | Stop reason: HOSPADM

## 2019-12-17 RX ORDER — MEDROXYPROGESTERONE ACETATE 2.5 MG/1
2.5 TABLET ORAL DAILY
Status: DISCONTINUED | OUTPATIENT
Start: 2019-12-17 | End: 2019-12-17

## 2019-12-17 RX ORDER — ATORVASTATIN CALCIUM 80 MG/1
80 TABLET, FILM COATED ORAL NIGHTLY
Status: DISCONTINUED | OUTPATIENT
Start: 2019-12-17 | End: 2019-12-17

## 2019-12-17 RX ORDER — IPRATROPIUM BROMIDE AND ALBUTEROL SULFATE 2.5; .5 MG/3ML; MG/3ML
1 SOLUTION RESPIRATORY (INHALATION) ONCE
Status: COMPLETED | OUTPATIENT
Start: 2019-12-17 | End: 2019-12-17

## 2019-12-17 RX ORDER — FLUDROCORTISONE ACETATE 0.1 MG/1
0.1 TABLET ORAL 2 TIMES DAILY
COMMUNITY

## 2019-12-17 RX ORDER — LEVOTHYROXINE SODIUM 0.1 MG/1
100 TABLET ORAL DAILY
Status: DISCONTINUED | OUTPATIENT
Start: 2019-12-17 | End: 2019-12-19 | Stop reason: HOSPADM

## 2019-12-17 RX ORDER — ASPIRIN 81 MG/1
324 TABLET, CHEWABLE ORAL ONCE
Status: DISCONTINUED | OUTPATIENT
Start: 2019-12-17 | End: 2019-12-19 | Stop reason: HOSPADM

## 2019-12-17 RX ORDER — ATORVASTATIN CALCIUM 40 MG/1
40 TABLET, FILM COATED ORAL NIGHTLY
Status: DISCONTINUED | OUTPATIENT
Start: 2019-12-17 | End: 2019-12-19 | Stop reason: HOSPADM

## 2019-12-17 RX ORDER — ESTRADIOL 1 MG/1
1 TABLET ORAL DAILY
Status: DISCONTINUED | OUTPATIENT
Start: 2019-12-17 | End: 2019-12-17

## 2019-12-17 RX ORDER — ASPIRIN 81 MG/1
81 TABLET, CHEWABLE ORAL DAILY
Status: DISCONTINUED | OUTPATIENT
Start: 2019-12-18 | End: 2019-12-19 | Stop reason: HOSPADM

## 2019-12-17 RX ORDER — ACETAMINOPHEN 325 MG/1
650 TABLET ORAL EVERY 4 HOURS PRN
Status: DISCONTINUED | OUTPATIENT
Start: 2019-12-17 | End: 2019-12-18 | Stop reason: SDUPTHER

## 2019-12-17 RX ORDER — ERGOCALCIFEROL 1.25 MG/1
50000 CAPSULE ORAL
Status: DISCONTINUED | OUTPATIENT
Start: 2019-12-18 | End: 2019-12-19 | Stop reason: HOSPADM

## 2019-12-17 RX ORDER — HEPARIN SODIUM 1000 [USP'U]/ML
30 INJECTION, SOLUTION INTRAVENOUS; SUBCUTANEOUS PRN
Status: DISCONTINUED | OUTPATIENT
Start: 2019-12-17 | End: 2019-12-19 | Stop reason: HOSPADM

## 2019-12-17 RX ADMIN — METHYLPREDNISOLONE SODIUM SUCCINATE 125 MG: 125 INJECTION, POWDER, FOR SOLUTION INTRAMUSCULAR; INTRAVENOUS at 12:13

## 2019-12-17 RX ADMIN — HYDROCORTISONE 10 MG: 10 TABLET ORAL at 21:13

## 2019-12-17 RX ADMIN — LEVOTHYROXINE SODIUM 100 MCG: 100 TABLET ORAL at 21:13

## 2019-12-17 RX ADMIN — IOPAMIDOL 55 ML: 755 INJECTION, SOLUTION INTRAVENOUS at 12:58

## 2019-12-17 RX ADMIN — HEPARIN SODIUM 12 UNITS/KG/HR: 10000 INJECTION, SOLUTION INTRAVENOUS at 18:28

## 2019-12-17 RX ADMIN — ATORVASTATIN CALCIUM 40 MG: 40 TABLET, FILM COATED ORAL at 21:12

## 2019-12-17 RX ADMIN — FLUDROCORTISONE ACETATE 0.1 MG: 0.1 TABLET ORAL at 21:13

## 2019-12-17 RX ADMIN — HEPARIN SODIUM 3440 UNITS: 1000 INJECTION, SOLUTION INTRAVENOUS; SUBCUTANEOUS at 18:28

## 2019-12-17 RX ADMIN — IPRATROPIUM BROMIDE AND ALBUTEROL SULFATE 1 AMPULE: .5; 3 SOLUTION RESPIRATORY (INHALATION) at 12:18

## 2019-12-17 ASSESSMENT — PAIN DESCRIPTION - LOCATION: LOCATION: CHEST

## 2019-12-17 ASSESSMENT — PAIN SCALES - GENERAL
PAINLEVEL_OUTOF10: 4
PAINLEVEL_OUTOF10: 0

## 2019-12-17 ASSESSMENT — ENCOUNTER SYMPTOMS
CHEST TIGHTNESS: 1
COUGH: 1
ABDOMINAL PAIN: 0
BACK PAIN: 0
EYE ITCHING: 0
SINUS PRESSURE: 0
SORE THROAT: 0
WHEEZING: 1
SHORTNESS OF BREATH: 1
EYE REDNESS: 0
SHORTNESS OF BREATH: 0
EYE PAIN: 0
NAUSEA: 0
DIARRHEA: 0
VOMITING: 0
RHINORRHEA: 0

## 2019-12-17 ASSESSMENT — PAIN DESCRIPTION - PAIN TYPE: TYPE: ACUTE PAIN

## 2019-12-17 ASSESSMENT — HEART SCORE: ECG: 2

## 2019-12-17 ASSESSMENT — PAIN DESCRIPTION - ORIENTATION: ORIENTATION: RIGHT

## 2019-12-18 ENCOUNTER — APPOINTMENT (OUTPATIENT)
Dept: CARDIAC CATH/INVASIVE PROCEDURES | Age: 54
End: 2019-12-18
Payer: MEDICAID

## 2019-12-18 VITALS
TEMPERATURE: 97.7 F | HEIGHT: 62 IN | DIASTOLIC BLOOD PRESSURE: 72 MMHG | OXYGEN SATURATION: 99 % | SYSTOLIC BLOOD PRESSURE: 103 MMHG | WEIGHT: 126.4 LBS | HEART RATE: 90 BPM | RESPIRATION RATE: 16 BRPM | BODY MASS INDEX: 23.26 KG/M2

## 2019-12-18 LAB
ABO: NORMAL
ANION GAP SERPL CALCULATED.3IONS-SCNC: 13 MEQ/L (ref 8–16)
ANTIBODY SCREEN: NORMAL
APTT: 119.5 SECONDS (ref 22–38)
APTT: 41.9 SECONDS (ref 22–38)
APTT: 41.9 SECONDS (ref 22–38)
BUN BLDV-MCNC: 10 MG/DL (ref 7–22)
CALCIUM SERPL-MCNC: 9.5 MG/DL (ref 8.5–10.5)
CHLORIDE BLD-SCNC: 106 MEQ/L (ref 98–111)
CHOLESTEROL, TOTAL: 201 MG/DL (ref 100–199)
CO2: 20 MEQ/L (ref 23–33)
CREAT SERPL-MCNC: 0.8 MG/DL (ref 0.4–1.2)
EKG ATRIAL RATE: 68 BPM
EKG P AXIS: 75 DEGREES
EKG P-R INTERVAL: 146 MS
EKG Q-T INTERVAL: 444 MS
EKG QRS DURATION: 74 MS
EKG QTC CALCULATION (BAZETT): 472 MS
EKG R AXIS: 52 DEGREES
EKG T AXIS: 60 DEGREES
EKG VENTRICULAR RATE: 68 BPM
ERYTHROCYTE [DISTWIDTH] IN BLOOD BY AUTOMATED COUNT: 13.2 % (ref 11.5–14.5)
ERYTHROCYTE [DISTWIDTH] IN BLOOD BY AUTOMATED COUNT: 45 FL (ref 35–45)
GFR SERPL CREATININE-BSD FRML MDRD: 75 ML/MIN/1.73M2
GLUCOSE BLD-MCNC: 305 MG/DL (ref 70–108)
HCT VFR BLD CALC: 40.2 % (ref 37–47)
HDLC SERPL-MCNC: 32 MG/DL
HEMOGLOBIN: 13.1 GM/DL (ref 12–16)
INR BLD: 1.13 (ref 0.85–1.13)
LDL CHOLESTEROL CALCULATED: 151 MG/DL
MCH RBC QN AUTO: 29.9 PG (ref 26–33)
MCHC RBC AUTO-ENTMCNC: 32.6 GM/DL (ref 32.2–35.5)
MCV RBC AUTO: 91.8 FL (ref 81–99)
PLATELET # BLD: 267 THOU/MM3 (ref 130–400)
PMV BLD AUTO: 10.4 FL (ref 9.4–12.4)
POTASSIUM REFLEX MAGNESIUM: 4.2 MEQ/L (ref 3.5–5.2)
RBC # BLD: 4.38 MILL/MM3 (ref 4.2–5.4)
RH FACTOR: NORMAL
SODIUM BLD-SCNC: 139 MEQ/L (ref 135–145)
TRIGL SERPL-MCNC: 91 MG/DL (ref 0–199)
WBC # BLD: 9.6 THOU/MM3 (ref 4.8–10.8)

## 2019-12-18 PROCEDURE — 85027 COMPLETE CBC AUTOMATED: CPT

## 2019-12-18 PROCEDURE — 36415 COLL VENOUS BLD VENIPUNCTURE: CPT

## 2019-12-18 PROCEDURE — 86850 RBC ANTIBODY SCREEN: CPT

## 2019-12-18 PROCEDURE — 6360000002 HC RX W HCPCS

## 2019-12-18 PROCEDURE — 93458 L HRT ARTERY/VENTRICLE ANGIO: CPT

## 2019-12-18 PROCEDURE — 2580000003 HC RX 258: Performed by: PHYSICIAN ASSISTANT

## 2019-12-18 PROCEDURE — 2709999900 HC NON-CHARGEABLE SUPPLY

## 2019-12-18 PROCEDURE — 86901 BLOOD TYPING SEROLOGIC RH(D): CPT

## 2019-12-18 PROCEDURE — C1769 GUIDE WIRE: HCPCS

## 2019-12-18 PROCEDURE — 93010 ELECTROCARDIOGRAM REPORT: CPT | Performed by: NUCLEAR MEDICINE

## 2019-12-18 PROCEDURE — 2500000003 HC RX 250 WO HCPCS

## 2019-12-18 PROCEDURE — 80048 BASIC METABOLIC PNL TOTAL CA: CPT

## 2019-12-18 PROCEDURE — 99217 PR OBSERVATION CARE DISCHARGE MANAGEMENT: CPT | Performed by: PHYSICIAN ASSISTANT

## 2019-12-18 PROCEDURE — G0378 HOSPITAL OBSERVATION PER HR: HCPCS

## 2019-12-18 PROCEDURE — 6370000000 HC RX 637 (ALT 250 FOR IP): Performed by: PHYSICIAN ASSISTANT

## 2019-12-18 PROCEDURE — 6360000002 HC RX W HCPCS: Performed by: INTERNAL MEDICINE

## 2019-12-18 PROCEDURE — C1894 INTRO/SHEATH, NON-LASER: HCPCS

## 2019-12-18 PROCEDURE — 80061 LIPID PANEL: CPT

## 2019-12-18 PROCEDURE — 85730 THROMBOPLASTIN TIME PARTIAL: CPT

## 2019-12-18 PROCEDURE — 96366 THER/PROPH/DIAG IV INF ADDON: CPT

## 2019-12-18 PROCEDURE — 93005 ELECTROCARDIOGRAM TRACING: CPT | Performed by: PHYSICIAN ASSISTANT

## 2019-12-18 PROCEDURE — 2580000003 HC RX 258: Performed by: INTERNAL MEDICINE

## 2019-12-18 PROCEDURE — 85610 PROTHROMBIN TIME: CPT

## 2019-12-18 PROCEDURE — 86900 BLOOD TYPING SEROLOGIC ABO: CPT

## 2019-12-18 PROCEDURE — 6370000000 HC RX 637 (ALT 250 FOR IP): Performed by: INTERNAL MEDICINE

## 2019-12-18 PROCEDURE — 6360000004 HC RX CONTRAST MEDICATION: Performed by: INTERNAL MEDICINE

## 2019-12-18 PROCEDURE — 93458 L HRT ARTERY/VENTRICLE ANGIO: CPT | Performed by: INTERNAL MEDICINE

## 2019-12-18 PROCEDURE — 96376 TX/PRO/DX INJ SAME DRUG ADON: CPT

## 2019-12-18 RX ORDER — SODIUM CHLORIDE 0.9 % (FLUSH) 0.9 %
10 SYRINGE (ML) INJECTION PRN
Status: DISCONTINUED | OUTPATIENT
Start: 2019-12-18 | End: 2019-12-19 | Stop reason: HOSPADM

## 2019-12-18 RX ORDER — SODIUM CHLORIDE 9 MG/ML
INJECTION, SOLUTION INTRAVENOUS CONTINUOUS
Status: DISCONTINUED | OUTPATIENT
Start: 2019-12-18 | End: 2019-12-19 | Stop reason: HOSPADM

## 2019-12-18 RX ORDER — SODIUM CHLORIDE 0.9 % (FLUSH) 0.9 %
10 SYRINGE (ML) INJECTION EVERY 12 HOURS SCHEDULED
Status: DISCONTINUED | OUTPATIENT
Start: 2019-12-18 | End: 2019-12-19 | Stop reason: HOSPADM

## 2019-12-18 RX ORDER — ACETAMINOPHEN 325 MG/1
650 TABLET ORAL EVERY 4 HOURS PRN
Status: DISCONTINUED | OUTPATIENT
Start: 2019-12-18 | End: 2019-12-19 | Stop reason: HOSPADM

## 2019-12-18 RX ORDER — ALPRAZOLAM 0.5 MG/1
0.5 TABLET ORAL
Status: DISCONTINUED | OUTPATIENT
Start: 2019-12-18 | End: 2019-12-18 | Stop reason: HOSPADM

## 2019-12-18 RX ORDER — ASPIRIN 81 MG/1
81 TABLET, CHEWABLE ORAL DAILY
Qty: 30 TABLET | Refills: 3 | Status: SHIPPED | OUTPATIENT
Start: 2019-12-19

## 2019-12-18 RX ADMIN — SODIUM CHLORIDE, PRESERVATIVE FREE 10 ML: 5 INJECTION INTRAVENOUS at 09:14

## 2019-12-18 RX ADMIN — ASPIRIN 81 MG: 81 TABLET, CHEWABLE ORAL at 09:10

## 2019-12-18 RX ADMIN — FLUDROCORTISONE ACETATE 0.1 MG: 0.1 TABLET ORAL at 09:11

## 2019-12-18 RX ADMIN — METOPROLOL TARTRATE 12.5 MG: 25 TABLET, FILM COATED ORAL at 09:12

## 2019-12-18 RX ADMIN — IOPAMIDOL 30 ML: 755 INJECTION, SOLUTION INTRAVENOUS at 14:14

## 2019-12-18 RX ADMIN — HYDROCORTISONE 10 MG: 10 TABLET ORAL at 09:12

## 2019-12-18 RX ADMIN — SODIUM CHLORIDE: 9 INJECTION, SOLUTION INTRAVENOUS at 04:28

## 2019-12-18 RX ADMIN — HEPARIN SODIUM 2000 UNITS: 1000 INJECTION, SOLUTION INTRAVENOUS; SUBCUTANEOUS at 12:42

## 2019-12-18 RX ADMIN — LEVOTHYROXINE SODIUM 100 MCG: 100 TABLET ORAL at 09:12

## 2019-12-18 ASSESSMENT — PAIN SCALES - GENERAL: PAINLEVEL_OUTOF10: 0

## 2020-01-02 ENCOUNTER — OFFICE VISIT (OUTPATIENT)
Dept: CARDIOLOGY CLINIC | Age: 55
End: 2020-01-02
Payer: MEDICAID

## 2020-01-02 VITALS
HEIGHT: 62 IN | BODY MASS INDEX: 23 KG/M2 | WEIGHT: 125 LBS | SYSTOLIC BLOOD PRESSURE: 98 MMHG | DIASTOLIC BLOOD PRESSURE: 60 MMHG | HEART RATE: 96 BPM

## 2020-01-02 PROCEDURE — G8484 FLU IMMUNIZE NO ADMIN: HCPCS | Performed by: INTERNAL MEDICINE

## 2020-01-02 PROCEDURE — 4004F PT TOBACCO SCREEN RCVD TLK: CPT | Performed by: INTERNAL MEDICINE

## 2020-01-02 PROCEDURE — 93000 ELECTROCARDIOGRAM COMPLETE: CPT | Performed by: INTERNAL MEDICINE

## 2020-01-02 PROCEDURE — G8427 DOCREV CUR MEDS BY ELIG CLIN: HCPCS | Performed by: INTERNAL MEDICINE

## 2020-01-02 PROCEDURE — 3017F COLORECTAL CA SCREEN DOC REV: CPT | Performed by: INTERNAL MEDICINE

## 2020-01-02 PROCEDURE — 99214 OFFICE O/P EST MOD 30 MIN: CPT | Performed by: INTERNAL MEDICINE

## 2020-01-02 PROCEDURE — G8420 CALC BMI NORM PARAMETERS: HCPCS | Performed by: INTERNAL MEDICINE

## 2020-01-02 NOTE — PROGRESS NOTES
[address] [facility],[place] [address] [address] [place] [address]  Dept: [phone]  Dept Fax: [phone]  Loc: [phone]    Visit Date: 1/2/2020    Ms. Kaelyn Flores is a 49 y.o. female  who presented for:  Chief Complaint   Patient presents with    Follow-up   chest pain  nonobstructive CAD  RECENT ADMISSION FOR CP    HPI:   TIERNEY Melchor is a pleasant 49year old female patient who  has a past medical history of Hyperlipidemia and Thyroid disease. The patient also has h/o cigarette smoking. During her recent admission to the hospital, patient presented with chest pain, she was put on heparin gtt and NTG gtt for possible unstable angina. There was evidence for pulmonary nodule for which repeat CT in 3-6 months was planned per DC summary. LHC revealed mild non-obstructive CAD, there was suspicion for vasospasm. Patient denies chest pain, shortness of breath, dyspnea on exertion, orthopnea, paroxysmal nocturnal dyspnea, palpitations, dizziness, syncope, weight gain or leg swelling.        Current Outpatient Medications:     aspirin 81 MG chewable tablet, Take 1 tablet by mouth daily, Disp: 30 tablet, Rfl: 3    metoprolol tartrate (LOPRESSOR) 25 MG tablet, Take 0.5 tablets by mouth 2 times daily, Disp: 60 tablet, Rfl: 3    estrogen, conjugated,-medroxyprogesterone (PREMPRO) 0.3-1.5 MG per tablet, Take 1 tablet by mouth daily, Disp: , Rfl:     fludrocortisone (FLORINEF) 0.1 MG tablet, Take 0.1 mg by mouth 2 times daily, Disp: , Rfl:     atorvastatin (LIPITOR) 40 MG tablet, Take 1 tablet by mouth daily Unsure of dose , Disp: , Rfl:     vitamin D (ERGOCALCIFEROL) 49471 units capsule, Take 1 capsule by mouth three times a week (Patient taking differently: Take 50,000 Units by mouth Twice a Week Mon & Fri), Disp: 5 capsule, Rfl: 1    levothyroxine (SYNTHROID) 100 MCG tablet, Take 1 tablet by mouth Daily, Disp: 30 tablet, Rfl: 1    hydrocortisone (CORTEF) 5 MG tablet, Take 10 mg by mouth 2 times daily , Disp: , Rfl:     Past Medical History  Jaja Patel  has a past medical history of Hyperlipidemia and Thyroid disease. Social History  Jaja Patel  reports that she has been smoking cigarettes. She has a 20.00 pack-year smoking history. She has never used smokeless tobacco. She reports previous alcohol use. She reports that she does not use drugs. Family History  Jaja Patel family history is not on file. Past Surgical History   Past Surgical History:   Procedure Laterality Date    BRAIN SURGERY      2017    DIAGNOSTIC CARDIAC CATH LAB PROCEDURE         Review of Systems   Constitutional: Negative for chills and fever  HENT: Negative for congestion, sinus pressure, sneezing and sore throat. Eyes: Negative for pain, discharge, redness and itching. Respiratory: Negative for apnea, cough  Gastrointestinal: Negative for blood in stool, constipation, diarrhea   Endocrine: Negative for cold intolerance, heat intolerance, polydipsia. Genitourinary: Negative for dysuria, enuresis, flank pain and hematuria. Musculoskeletal: Negative for arthralgias, joint swelling and neck pain. Neurological: Negative for numbness and headaches. Psychiatric/Behavioral: Negative for agitation, confusion, decreased concentration and dysphoric mood. Objective:     BP 98/60   Pulse 96   Ht 5' 2\" (1.575 m)   Wt 125 lb (56.7 kg)   BMI 22.86 kg/m²     Wt Readings from Last 3 Encounters:   01/02/20 125 lb (56.7 kg)   12/17/19 126 lb 6.4 oz (57.3 kg)   11/20/19 120 lb (54.4 kg)     BP Readings from Last 3 Encounters:   01/02/20 98/60   12/18/19 103/72   11/20/19 138/80       Nursing note and vitals reviewed. Physical Exam   Constitutional: Oriented to person, place, and time. Appears well-developed and well-nourished. ENT: Moist mucous membranes. No bleeding. Tongue is midline. Head: Normocephalic and atraumatic. Eyes: EOM are normal. Pupils are equal, round, and reactive to light. 09/28/19   ECHO Complete 2D W Doppler W Color    Narrative Transthoracic Echocardiography Report (TTE)     Demographics      Patient Name     Cydney Pal  Gender                Female      MR #             [id number]   Race                                                     Ethnicity      Account #        [de-identified]   Room Number           5232      Accession Number [id number]   Date of Study         09/30/2019      Date of Birth    1965  Referring Physician   Isaura Clinton MD      Age              49 year(s)  Sonographer           PABLO Cates                                   Interpreting          Echo reader of the week                                Physician             Kennedy Strickland MD     Procedure    Type of Study      TTE procedure:ECHOCARDIOGRAM COMPLETE 2D W DOPPLER W COLOR. Procedure Date  Date: 09/30/2019 Start: 09:44 AM    Study Location: Bedside  Technical Quality: Adequate visualization    Indications:Lower extremity edema. Additional Medical History: Former smoker    Patient Status: Routine    Height: 62 inches Weight: 141 pounds BSA: 1.65 m^2 BMI: 25.79 kg/m^2    BP: 141/76 mmHg     Conclusions      Summary   Ejection fraction is visually estimated at 55%. Overall left ventricular function is normal.   The aortic valve leaflets were not well visualized. Aortic valve appears tricuspid. Aortic valve leaflets are somewhat thickened. Trivial aortic regurgitaiton is noted. Mild mitral regurgitaiton is present. Signature      ----------------------------------------------------------------   Electronically signed by Kennedy Strickland MD (Interpreting   physician) on 12/18/2019 at 10:54 AM   ----------------------------------------------------------------      Findings      Mitral Valve   Mild mitral regurgitaiton is present. Aortic Valve   The aortic valve leaflets were not well visualized. Aortic valve appears tricuspid.    Aortic valve leaflets are TR Gradient:18.66 mmHg                                                     PV Peak Velocity: 58.7   MV E' Septal Velocity: 9.5                        cm/s   cm/s                       AV DVI (Vmax):0.71     PV Peak Gradient: 1.38   MV A' Septal Velocity: 6.1                        mmHg   cm/s   MV E' Lateral Velocity:   9.1 cm/s   MV A' Lateral Velocity:   9.5 cm/s   E/E' septal: 12.21   E/E' lateral: 12.75   MR Velocity: 486 cm/s     [email or web address] [id number]%[id number]        Ekg:   EKG Interpretation:  normal EKG, normal sinus rhythm, unchanged from previous tracings. Cath:CORONARY ANGIOGRAM:  1. Right coronary artery:  Right coronary artery is a dominant vessel. Proximal RCA initially showed pseudo-lesion due to vasospasm. This was  resolved during repeat images and this was suspected due to vasospasm,  catheter related. No significant stenosis in the proximal segment of  RCA. Mid RCA with mild diffuse disease. Distal RCA is patent without  significant stenosis. Distal RCA bifurcates into RPDA and RPL. Both  RPDA and RPL are patent without significant stenotic lesions. 2.  Left main coronary artery:  Left main coronary artery is patent. No  significant stenosis noted. It bifurcates into LCX and LAD. 3.  Left circumflex artery:  Proximal left circumflex artery has 20%  lesion. OM1 is a branching vessel without significant stenosis. OM2 is  patent without significant stenosis. Distal LCX with mild diffuse  disease. 4. LAD:  LAD has 20% lesion in the proximal segment. Mid LAD has a 30%  lesion. Distally, LAD is patent without significant stenosis. The D1  has mild diffuse disease.     MEDICATIONS:  See MAR.     COMPLICATIONS:  None.     ESTIMATED BLOOD LOSS:  Less than 20 mL.     ACCESS:  Right radial artery access.   Vasc Band was applied for  successful hemostasis.     CONCLUSION:  1. Nonobstructive coronary artery disease was noted in coronary  angiogram.  2.  Preserved ejection fraction.     RECOMMENDATIONS:  1. Medical management. 2.  Aggressive risk factor modification for coronary artery disease. 3.  Continue aspirin 81 mg p.o. daily. 4.  Continue Lipitor 40 mg p.o. daily. 5.  May consider vasodilators like Imdur if vasospasm is suspected.     Findings were discussed with the patient in details.           Sydelle Rinne, MD     D: 12/18/2019 14:11:39    AssessmentPlan:     Shanell Jordan is a pleasant 49year old female patient who  has a past medical history of Hyperlipidemia and Thyroid disease. The patient also has h/o cigarette smoking. During her recent admission to the hospital, patient presented with chest pain, she was put on heparin gtt and NTG gtt for possible unstable angina. There was evidence for pulmonary nodule for which repeat CT in 3-6 months was planned per DC summary. LHC revealed mild non-obstructive CAD, there was suspicion for vasospasm. Patient denies chest pain, shortness of breath, dyspnea on exertion, orthopnea, paroxysmal nocturnal dyspnea, palpitations, dizziness, syncope, weight gain or leg swelling. 1. Nonobstructive CAD  2. ?microvascular disease  3. Mild MR  4. Smoking   5. Dyslipidemia  6. Lung nodule  - patient feels better  - she denies chest pain  - preserved EF on TTE  - Smoking: discussed with the patient the importance of smoke cessation especially with the risk of CAD  - BP well controlled  - The patient was instructed to check her blood pressure at home, and record the readings. She will call office if blood pressure readings are high or low, will further adjust her antihypertensive medications accordingly   - cont ASA 81 mg po daily   - cont metoprolol   - cont lipitor     Above findings and plan of care were discussed with patient in details, patient's questions were answered.      Disposition:  RTC in 12 months      Electronically signed by Marco Griffin MD   1/2/2020 at 2:49 PM

## 2020-01-02 NOTE — PROGRESS NOTES
Pt here for follow S/P cath done 2-3 weeks ago. EKG done today. Pt denies chest pain, SOB, lightheadedness, dizziness, palpitations, ETELVINA.

## 2020-01-15 RX ORDER — HYDROCORTISONE 5 MG/1
10 TABLET ORAL 2 TIMES DAILY
OUTPATIENT
Start: 2020-01-15

## 2020-01-15 NOTE — TELEPHONE ENCOUNTER
Sonia Harrison called requesting a refill on the following medications:  Requested Prescriptions     Pending Prescriptions Disp Refills    hydrocortisone (CORTEF) 5 MG tablet       Sig: Take 2 tablets by mouth 2 times daily     Pharmacy verified: [facility]      Date of last visit: 1/02/2020  Date of next visit (if applicable): Visit date not found

## 2020-01-16 RX ORDER — ATORVASTATIN CALCIUM 40 MG/1
40 TABLET, FILM COATED ORAL DAILY
Qty: 90 TABLET | Refills: 3 | Status: SHIPPED | OUTPATIENT
Start: 2020-01-16

## 2020-02-25 ENCOUNTER — OFFICE VISIT (OUTPATIENT)
Dept: CARDIOLOGY CLINIC | Age: 55
End: 2020-02-25
Payer: MEDICAID

## 2020-02-25 VITALS
SYSTOLIC BLOOD PRESSURE: 128 MMHG | DIASTOLIC BLOOD PRESSURE: 70 MMHG | BODY MASS INDEX: 24.18 KG/M2 | HEART RATE: 78 BPM | WEIGHT: 131.4 LBS | HEIGHT: 62 IN

## 2020-02-25 PROCEDURE — G8420 CALC BMI NORM PARAMETERS: HCPCS | Performed by: NURSE PRACTITIONER

## 2020-02-25 PROCEDURE — 99213 OFFICE O/P EST LOW 20 MIN: CPT | Performed by: NURSE PRACTITIONER

## 2020-02-25 PROCEDURE — 3017F COLORECTAL CA SCREEN DOC REV: CPT | Performed by: NURSE PRACTITIONER

## 2020-02-25 PROCEDURE — G8484 FLU IMMUNIZE NO ADMIN: HCPCS | Performed by: NURSE PRACTITIONER

## 2020-02-25 PROCEDURE — G8427 DOCREV CUR MEDS BY ELIG CLIN: HCPCS | Performed by: NURSE PRACTITIONER

## 2020-02-25 PROCEDURE — 4004F PT TOBACCO SCREEN RCVD TLK: CPT | Performed by: NURSE PRACTITIONER
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
## 2020-04-06 ENCOUNTER — TELEPHONE (OUTPATIENT)
Dept: CARDIOLOGY CLINIC | Age: 55
End: 2020-04-06

## 2020-04-06 NOTE — TELEPHONE ENCOUNTER
Pt is needing a letter written to see if patient can get a better apartment due to heart issues and safety reasons. Pt states if she has a letter stating why Dr. Dez Dorman sees her as a patient that they will move her to a safer apartment. Pt would like this mailed to her.

## 2020-04-06 NOTE — TELEPHONE ENCOUNTER
Can provide letter stating her cardiac history:     The patient was seen by myself in office to evaluate for chest pain. Her echocardiogram was unremarkable. She was found to have unremarkable, nonobstructive coronary artery disease for which medical management was recommended. No intervention was warranted.

## 2020-04-19 ENCOUNTER — APPOINTMENT (OUTPATIENT)
Dept: GENERAL RADIOLOGY | Age: 55
DRG: 137 | End: 2020-04-19
Payer: MEDICAID

## 2020-04-19 ENCOUNTER — HOSPITAL ENCOUNTER (INPATIENT)
Age: 55
LOS: 1 days | Discharge: HOME | DRG: 137 | End: 2020-04-20
Attending: EMERGENCY MEDICINE | Admitting: INTERNAL MEDICINE
Payer: MEDICAID

## 2020-04-19 PROBLEM — R05.9 COUGH: Status: ACTIVE | Noted: 2020-04-19

## 2020-04-19 LAB
ALBUMIN SERPL-MCNC: 3.9 G/DL (ref 3.5–5.1)
ALP BLD-CCNC: 59 U/L (ref 38–126)
ALT SERPL-CCNC: 16 U/L (ref 11–66)
ANION GAP SERPL CALCULATED.3IONS-SCNC: 15 MEQ/L (ref 8–16)
AST SERPL-CCNC: 49 U/L (ref 5–40)
BASOPHILS # BLD: 0.5 %
BASOPHILS ABSOLUTE: 0 THOU/MM3 (ref 0–0.1)
BILIRUB SERPL-MCNC: 0.2 MG/DL (ref 0.3–1.2)
BILIRUBIN DIRECT: < 0.2 MG/DL (ref 0–0.3)
BUN BLDV-MCNC: 13 MG/DL (ref 7–22)
C-REACTIVE PROTEIN: 8.41 MG/DL (ref 0–1)
CALCIUM SERPL-MCNC: 8.7 MG/DL (ref 8.5–10.5)
CHLORIDE BLD-SCNC: 101 MEQ/L (ref 98–111)
CHP ED QC CHECK: YES
CO2: 20 MEQ/L (ref 23–33)
CREAT SERPL-MCNC: 0.8 MG/DL (ref 0.4–1.2)
D-DIMER QUANTITATIVE: 496 NG/ML FEU (ref 0–500)
EOSINOPHIL # BLD: 0.5 %
EOSINOPHILS ABSOLUTE: 0 THOU/MM3 (ref 0–0.4)
ERYTHROCYTE [DISTWIDTH] IN BLOOD BY AUTOMATED COUNT: 13.7 % (ref 11.5–14.5)
ERYTHROCYTE [DISTWIDTH] IN BLOOD BY AUTOMATED COUNT: 47.8 FL (ref 35–45)
FERRITIN: 150 NG/ML (ref 10–291)
FLU A ANTIGEN: NEGATIVE
FLU B ANTIGEN: NEGATIVE
GFR SERPL CREATININE-BSD FRML MDRD: 75 ML/MIN/1.73M2
GLUCOSE BLD-MCNC: 153 MG/DL
GLUCOSE BLD-MCNC: 153 MG/DL (ref 70–108)
GLUCOSE BLD-MCNC: 33 MG/DL (ref 70–108)
GLUCOSE BLD-MCNC: 36 MG/DL (ref 70–108)
HCT VFR BLD CALC: 46.3 % (ref 37–47)
HEMOGLOBIN: 14.9 GM/DL (ref 12–16)
IMMATURE GRANS (ABS): 0.01 THOU/MM3 (ref 0–0.07)
IMMATURE GRANULOCYTES: 0.1 %
LYMPHOCYTES # BLD: 57.2 %
LYMPHOCYTES ABSOLUTE: 5.3 THOU/MM3 (ref 1–4.8)
MCH RBC QN AUTO: 30.2 PG (ref 26–33)
MCHC RBC AUTO-ENTMCNC: 32.2 GM/DL (ref 32.2–35.5)
MCV RBC AUTO: 93.9 FL (ref 81–99)
MONOCYTES # BLD: 6.5 %
MONOCYTES ABSOLUTE: 0.6 THOU/MM3 (ref 0.4–1.3)
NUCLEATED RED BLOOD CELLS: 0 /100 WBC
OSMOLALITY CALCULATION: 268.6 MOSMOL/KG (ref 275–300)
PLATELET # BLD: 196 THOU/MM3 (ref 130–400)
PMV BLD AUTO: 10.4 FL (ref 9.4–12.4)
POTASSIUM SERPL-SCNC: 3.9 MEQ/L (ref 3.5–5.2)
RBC # BLD: 4.93 MILL/MM3 (ref 4.2–5.4)
SARS-COV-2, NAAT: DETECTED
SCAN OF BLOOD SMEAR: NORMAL
SEG NEUTROPHILS: 35.2 %
SEGMENTED NEUTROPHILS ABSOLUTE COUNT: 3.3 THOU/MM3 (ref 1.8–7.7)
SODIUM BLD-SCNC: 136 MEQ/L (ref 135–145)
TOTAL PROTEIN: 6.8 G/DL (ref 6.1–8)
TROPONIN T: < 0.01 NG/ML
WBC # BLD: 9.3 THOU/MM3 (ref 4.8–10.8)

## 2020-04-19 PROCEDURE — 87804 INFLUENZA ASSAY W/OPTIC: CPT

## 2020-04-19 PROCEDURE — 36415 COLL VENOUS BLD VENIPUNCTURE: CPT

## 2020-04-19 PROCEDURE — 96374 THER/PROPH/DIAG INJ IV PUSH: CPT

## 2020-04-19 PROCEDURE — 84484 ASSAY OF TROPONIN QUANT: CPT

## 2020-04-19 PROCEDURE — 85379 FIBRIN DEGRADATION QUANT: CPT

## 2020-04-19 PROCEDURE — 99221 1ST HOSP IP/OBS SF/LOW 40: CPT | Performed by: INTERNAL MEDICINE

## 2020-04-19 PROCEDURE — 85025 COMPLETE CBC W/AUTO DIFF WBC: CPT

## 2020-04-19 PROCEDURE — 6370000000 HC RX 637 (ALT 250 FOR IP): Performed by: NURSE PRACTITIONER

## 2020-04-19 PROCEDURE — APPSS180 APP SPLIT SHARED TIME > 60 MINUTES: Performed by: NURSE PRACTITIONER

## 2020-04-19 PROCEDURE — 71045 X-RAY EXAM CHEST 1 VIEW: CPT

## 2020-04-19 PROCEDURE — 82948 REAGENT STRIP/BLOOD GLUCOSE: CPT

## 2020-04-19 PROCEDURE — 99285 EMERGENCY DEPT VISIT HI MDM: CPT

## 2020-04-19 PROCEDURE — 86140 C-REACTIVE PROTEIN: CPT

## 2020-04-19 PROCEDURE — 2060000000 HC ICU INTERMEDIATE R&B

## 2020-04-19 PROCEDURE — 96361 HYDRATE IV INFUSION ADD-ON: CPT

## 2020-04-19 PROCEDURE — 6360000002 HC RX W HCPCS: Performed by: EMERGENCY MEDICINE

## 2020-04-19 PROCEDURE — 2580000003 HC RX 258

## 2020-04-19 PROCEDURE — 2580000003 HC RX 258: Performed by: EMERGENCY MEDICINE

## 2020-04-19 PROCEDURE — U0002 COVID-19 LAB TEST NON-CDC: HCPCS

## 2020-04-19 PROCEDURE — 80053 COMPREHEN METABOLIC PANEL: CPT

## 2020-04-19 PROCEDURE — 82728 ASSAY OF FERRITIN: CPT

## 2020-04-19 PROCEDURE — 82248 BILIRUBIN DIRECT: CPT

## 2020-04-19 RX ORDER — HYDROXYCHLOROQUINE SULFATE 200 MG/1
200 TABLET, FILM COATED ORAL EVERY 12 HOURS
Status: DISCONTINUED | OUTPATIENT
Start: 2020-04-20 | End: 2020-04-20 | Stop reason: HOSPADM

## 2020-04-19 RX ORDER — FLUDROCORTISONE ACETATE 0.1 MG/1
0.1 TABLET ORAL 2 TIMES DAILY
Status: DISCONTINUED | OUTPATIENT
Start: 2020-04-19 | End: 2020-04-20 | Stop reason: HOSPADM

## 2020-04-19 RX ORDER — AZITHROMYCIN 250 MG/1
500 TABLET, FILM COATED ORAL DAILY
Status: COMPLETED | OUTPATIENT
Start: 2020-04-20 | End: 2020-04-20

## 2020-04-19 RX ORDER — ASPIRIN 81 MG/1
81 TABLET, CHEWABLE ORAL DAILY
Status: DISCONTINUED | OUTPATIENT
Start: 2020-04-20 | End: 2020-04-20 | Stop reason: HOSPADM

## 2020-04-19 RX ORDER — HYDROCORTISONE 10 MG/1
10 TABLET ORAL 2 TIMES DAILY
Status: DISCONTINUED | OUTPATIENT
Start: 2020-04-19 | End: 2020-04-20 | Stop reason: HOSPADM

## 2020-04-19 RX ORDER — LEVOTHYROXINE SODIUM 0.1 MG/1
100 TABLET ORAL DAILY
Status: DISCONTINUED | OUTPATIENT
Start: 2020-04-20 | End: 2020-04-20 | Stop reason: HOSPADM

## 2020-04-19 RX ORDER — AZITHROMYCIN 250 MG/1
250 TABLET, FILM COATED ORAL DAILY
Status: DISCONTINUED | OUTPATIENT
Start: 2020-04-21 | End: 2020-04-20 | Stop reason: HOSPADM

## 2020-04-19 RX ORDER — ACETAMINOPHEN 650 MG/1
650 SUPPOSITORY RECTAL EVERY 6 HOURS PRN
Status: DISCONTINUED | OUTPATIENT
Start: 2020-04-19 | End: 2020-04-20 | Stop reason: HOSPADM

## 2020-04-19 RX ORDER — HYDROXYCHLOROQUINE SULFATE 200 MG/1
400 TABLET, FILM COATED ORAL EVERY 12 HOURS
Status: COMPLETED | OUTPATIENT
Start: 2020-04-19 | End: 2020-04-20

## 2020-04-19 RX ORDER — SODIUM CHLORIDE 9 MG/ML
INJECTION, SOLUTION INTRAVENOUS CONTINUOUS
Status: DISCONTINUED | OUTPATIENT
Start: 2020-04-19 | End: 2020-04-20

## 2020-04-19 RX ORDER — ACETAMINOPHEN 325 MG/1
650 TABLET ORAL EVERY 6 HOURS PRN
Status: DISCONTINUED | OUTPATIENT
Start: 2020-04-19 | End: 2020-04-20 | Stop reason: HOSPADM

## 2020-04-19 RX ORDER — DEXTROSE MONOHYDRATE 25 G/50ML
INJECTION, SOLUTION INTRAVENOUS
Status: COMPLETED
Start: 2020-04-19 | End: 2020-04-19

## 2020-04-19 RX ORDER — 0.9 % SODIUM CHLORIDE 0.9 %
500 INTRAVENOUS SOLUTION INTRAVENOUS ONCE
Status: COMPLETED | OUTPATIENT
Start: 2020-04-19 | End: 2020-04-19

## 2020-04-19 RX ORDER — METOCLOPRAMIDE HYDROCHLORIDE 5 MG/ML
10 INJECTION INTRAMUSCULAR; INTRAVENOUS ONCE
Status: COMPLETED | OUTPATIENT
Start: 2020-04-19 | End: 2020-04-19

## 2020-04-19 RX ORDER — ATORVASTATIN CALCIUM 40 MG/1
40 TABLET, FILM COATED ORAL DAILY
Status: DISCONTINUED | OUTPATIENT
Start: 2020-04-20 | End: 2020-04-20 | Stop reason: HOSPADM

## 2020-04-19 RX ADMIN — DEXTROSE MONOHYDRATE 50 ML: 500 INJECTION PARENTERAL at 16:48

## 2020-04-19 RX ADMIN — HYDROXYCHLOROQUINE SULFATE 400 MG: 200 TABLET ORAL at 23:48

## 2020-04-19 RX ADMIN — HYDROCORTISONE 10 MG: 10 TABLET ORAL at 23:48

## 2020-04-19 RX ADMIN — FLUDROCORTISONE ACETATE 0.1 MG: 0.1 TABLET ORAL at 23:48

## 2020-04-19 RX ADMIN — METOCLOPRAMIDE 10 MG: 5 INJECTION, SOLUTION INTRAMUSCULAR; INTRAVENOUS at 16:17

## 2020-04-19 RX ADMIN — SODIUM CHLORIDE: 9 INJECTION, SOLUTION INTRAVENOUS at 17:22

## 2020-04-19 RX ADMIN — ACETAMINOPHEN 650 MG: 325 TABLET ORAL at 23:48

## 2020-04-19 RX ADMIN — SODIUM CHLORIDE 500 ML: 9 INJECTION, SOLUTION INTRAVENOUS at 16:15

## 2020-04-19 RX ADMIN — METOPROLOL TARTRATE 12.5 MG: 25 TABLET ORAL at 23:48

## 2020-04-19 ASSESSMENT — PAIN DESCRIPTION - FREQUENCY: FREQUENCY: CONTINUOUS

## 2020-04-19 ASSESSMENT — ENCOUNTER SYMPTOMS
SORE THROAT: 0
NAUSEA: 1
SHORTNESS OF BREATH: 0
VOMITING: 1
ABDOMINAL PAIN: 0
COLOR CHANGE: 0
DIARRHEA: 1
COUGH: 1

## 2020-04-19 ASSESSMENT — PAIN SCALES - GENERAL
PAINLEVEL_OUTOF10: 6
PAINLEVEL_OUTOF10: 0

## 2020-04-19 ASSESSMENT — PAIN DESCRIPTION - LOCATION: LOCATION: HEAD

## 2020-04-19 ASSESSMENT — PAIN DESCRIPTION - ONSET: ONSET: GRADUAL

## 2020-04-19 ASSESSMENT — PAIN DESCRIPTION - DESCRIPTORS: DESCRIPTORS: HEADACHE

## 2020-04-19 NOTE — ED PROVIDER NOTES
daily    ATORVASTATIN (LIPITOR) 40 MG TABLET    Take 1 tablet by mouth daily    ESTROGEN, CONJUGATED,-MEDROXYPROGESTERONE (PREMPRO) 0.3-1.5 MG PER TABLET    Take 1 tablet by mouth daily    FLUDROCORTISONE (FLORINEF) 0.1 MG TABLET    Take 0.1 mg by mouth 2 times daily    HYDROCORTISONE (CORTEF) 5 MG TABLET    Take 10 mg by mouth 2 times daily     LEVOTHYROXINE (SYNTHROID) 100 MCG TABLET    Take 1 tablet by mouth Daily    METOPROLOL TARTRATE (LOPRESSOR) 25 MG TABLET    Take 0.5 tablets by mouth 2 times daily    VITAMIN D (ERGOCALCIFEROL) 09814 UNITS CAPSULE    Take 1 capsule by mouth three times a week       ALLERGIES    is allergic to egg yolk and morphine and related. FAMILY HISTORY    She indicated that her mother is . She indicated that her father is . family history is not on file. SOCIAL HISTORY     reports that she has been smoking cigarettes. She has a 20.00 pack-year smoking history. She has never used smokeless tobacco. She reports previous alcohol use. She reports that she does not use drugs. PHYSICAL EXAM      INITIAL VITALS: /81   Pulse 104   Temp 99.4 °F (37.4 °C) (Oral)   Resp 16   Ht 5' 2\" (1.575 m)   Wt 120 lb (54.4 kg)   SpO2 100%   BMI 21.95 kg/m² Estimated body mass index is 21.95 kg/m² as calculated from the following:    Height as of this encounter: 5' 2\" (1.575 m). Weight as of this encounter: 120 lb (54.4 kg). Physical Exam  Vitals signs and nursing note reviewed. Constitutional:       Appearance: She is well-developed. She is not toxic-appearing or diaphoretic. HENT:      Head: Normocephalic and atraumatic. Right Ear: Tympanic membrane and external ear normal.      Left Ear: Tympanic membrane and external ear normal.      Nose: Nose normal.      Mouth/Throat:      Pharynx: No oropharyngeal exudate or posterior oropharyngeal erythema.    Eyes:      Conjunctiva/sclera: Conjunctivae normal.   Neck:      Musculoskeletal: Normal range of motion

## 2020-04-19 NOTE — ED NOTES
ED to inpatient nurses report    Chief Complaint   Patient presents with    Nausea & Vomiting    Diarrhea    Fatigue    Concern For COVID-19      Present to ED from home  LOC: alert and orientated to name, place, date  Vital signs   Vitals:    04/19/20 1646 04/19/20 1701 04/19/20 1747 04/19/20 1817   BP: 112/81 114/62 115/64 110/81   Pulse:       Resp:       Temp:       TempSrc:       SpO2: 97% 100% 97% 100%   Weight:       Height:          Oxygen Baseline 100    Current needs required no Bipap/Cpap No  LDAs:   Peripheral IV 04/19/20 Left Antecubital (Active)   Site Assessment Clean;Dry; Intact 4/19/2020  5:22 PM   Line Status Infusing 4/19/2020  5:22 PM   Dressing Status Clean;Dry; Intact 4/19/2020  5:22 PM   Dressing Intervention New 4/19/2020  3:55 PM     Mobility: Requires assistance * 1  Pending ED orders: none  Present condition: stable    Electronically signed by Jose David Liu RN on 4/19/2020 at 7:19 PM     Jose David Liu RN  04/19/20 Ronak 1579, RN  04/19/20 1927

## 2020-04-19 NOTE — ED NOTES
Pt updated on POC. Vitals updated. Denies needs at this time. Alert and oriented x4. Breathing easy and unlabored. Denies nausea/vomiting at this time. Denies all pains. Rprts feeling better.       Liv Sawyer RN  04/19/20 [id number]

## 2020-04-20 VITALS
DIASTOLIC BLOOD PRESSURE: 58 MMHG | OXYGEN SATURATION: 97 % | WEIGHT: 125.9 LBS | HEART RATE: 74 BPM | SYSTOLIC BLOOD PRESSURE: 118 MMHG | TEMPERATURE: 97.8 F | HEIGHT: 62 IN | RESPIRATION RATE: 16 BRPM | BODY MASS INDEX: 23.17 KG/M2

## 2020-04-20 PROBLEM — U07.1 COVID-19: Status: ACTIVE | Noted: 2020-04-20

## 2020-04-20 PROBLEM — E87.20 METABOLIC ACIDOSIS: Status: RESOLVED | Noted: 2019-09-04 | Resolved: 2020-04-20

## 2020-04-20 PROBLEM — R11.2 NAUSEA & VOMITING: Status: ACTIVE | Noted: 2020-04-20

## 2020-04-20 PROBLEM — R05.9 COUGH: Status: RESOLVED | Noted: 2020-04-19 | Resolved: 2020-04-20

## 2020-04-20 PROBLEM — E16.2 HYPOGLYCEMIA: Status: RESOLVED | Noted: 2020-04-20 | Resolved: 2020-04-20

## 2020-04-20 PROBLEM — E16.2 HYPOGLYCEMIA: Status: ACTIVE | Noted: 2020-04-20

## 2020-04-20 PROBLEM — R11.2 NAUSEA & VOMITING: Status: RESOLVED | Noted: 2020-04-20 | Resolved: 2020-04-20

## 2020-04-20 LAB
ANION GAP SERPL CALCULATED.3IONS-SCNC: 11 MEQ/L (ref 8–16)
BUN BLDV-MCNC: 10 MG/DL (ref 7–22)
CALCIUM SERPL-MCNC: 7.9 MG/DL (ref 8.5–10.5)
CHLORIDE BLD-SCNC: 106 MEQ/L (ref 98–111)
CO2: 21 MEQ/L (ref 23–33)
CREAT SERPL-MCNC: 0.8 MG/DL (ref 0.4–1.2)
EKG ATRIAL RATE: 72 BPM
EKG P AXIS: 78 DEGREES
EKG P-R INTERVAL: 172 MS
EKG Q-T INTERVAL: 442 MS
EKG QRS DURATION: 76 MS
EKG QTC CALCULATION (BAZETT): 483 MS
EKG R AXIS: 71 DEGREES
EKG T AXIS: 59 DEGREES
EKG VENTRICULAR RATE: 72 BPM
ERYTHROCYTE [DISTWIDTH] IN BLOOD BY AUTOMATED COUNT: 13.6 % (ref 11.5–14.5)
ERYTHROCYTE [DISTWIDTH] IN BLOOD BY AUTOMATED COUNT: 49.1 FL (ref 35–45)
GFR SERPL CREATININE-BSD FRML MDRD: 75 ML/MIN/1.73M2
GLUCOSE BLD-MCNC: 113 MG/DL (ref 70–108)
GLUCOSE BLD-MCNC: 121 MG/DL (ref 70–108)
GLUCOSE BLD-MCNC: 150 MG/DL (ref 70–108)
GLUCOSE BLD-MCNC: 68 MG/DL (ref 70–108)
HCT VFR BLD CALC: 40.7 % (ref 37–47)
HEMOGLOBIN: 13 GM/DL (ref 12–16)
MCH RBC QN AUTO: 31.3 PG (ref 26–33)
MCHC RBC AUTO-ENTMCNC: 31.9 GM/DL (ref 32.2–35.5)
MCV RBC AUTO: 98.1 FL (ref 81–99)
PLATELET # BLD: 156 THOU/MM3 (ref 130–400)
PMV BLD AUTO: 10.1 FL (ref 9.4–12.4)
POTASSIUM REFLEX MAGNESIUM: 4.2 MEQ/L (ref 3.5–5.2)
RBC # BLD: 4.15 MILL/MM3 (ref 4.2–5.4)
SODIUM BLD-SCNC: 138 MEQ/L (ref 135–145)
WBC # BLD: 5.3 THOU/MM3 (ref 4.8–10.8)

## 2020-04-20 PROCEDURE — 93005 ELECTROCARDIOGRAM TRACING: CPT | Performed by: NURSE PRACTITIONER

## 2020-04-20 PROCEDURE — 93010 ELECTROCARDIOGRAM REPORT: CPT | Performed by: INTERNAL MEDICINE

## 2020-04-20 PROCEDURE — 99231 SBSQ HOSP IP/OBS SF/LOW 25: CPT | Performed by: INTERNAL MEDICINE

## 2020-04-20 PROCEDURE — 36415 COLL VENOUS BLD VENIPUNCTURE: CPT

## 2020-04-20 PROCEDURE — 80048 BASIC METABOLIC PNL TOTAL CA: CPT

## 2020-04-20 PROCEDURE — 6370000000 HC RX 637 (ALT 250 FOR IP): Performed by: NURSE PRACTITIONER

## 2020-04-20 PROCEDURE — 82948 REAGENT STRIP/BLOOD GLUCOSE: CPT

## 2020-04-20 PROCEDURE — 85027 COMPLETE CBC AUTOMATED: CPT

## 2020-04-20 PROCEDURE — APPSS45 APP SPLIT SHARED TIME 31-45 MINUTES: Performed by: NURSE PRACTITIONER

## 2020-04-20 RX ORDER — HYDROXYCHLOROQUINE SULFATE 200 MG/1
200 TABLET, FILM COATED ORAL EVERY 12 HOURS
Qty: 8 TABLET | Refills: 0 | Status: CANCELLED | OUTPATIENT
Start: 2020-04-20

## 2020-04-20 RX ORDER — ONDANSETRON 2 MG/ML
4 INJECTION INTRAMUSCULAR; INTRAVENOUS EVERY 6 HOURS PRN
Status: DISCONTINUED | OUTPATIENT
Start: 2020-04-20 | End: 2020-04-20 | Stop reason: HOSPADM

## 2020-04-20 RX ORDER — HYDROXYCHLOROQUINE SULFATE 200 MG/1
200 TABLET, FILM COATED ORAL EVERY 12 HOURS
Qty: 8 TABLET | Refills: 0 | Status: SHIPPED | OUTPATIENT
Start: 2020-04-20 | End: 2020-04-20

## 2020-04-20 RX ORDER — POLYETHYLENE GLYCOL 3350 17 G/17G
17 POWDER, FOR SOLUTION ORAL DAILY PRN
Status: DISCONTINUED | OUTPATIENT
Start: 2020-04-20 | End: 2020-04-20 | Stop reason: HOSPADM

## 2020-04-20 RX ORDER — HYDROXYCHLOROQUINE SULFATE 200 MG/1
200 TABLET, FILM COATED ORAL EVERY 12 HOURS
Qty: 20 TABLET | Refills: 0 | Status: SHIPPED | OUTPATIENT
Start: 2020-04-20 | End: 2020-04-30

## 2020-04-20 RX ORDER — SODIUM CHLORIDE 0.9 % (FLUSH) 0.9 %
10 SYRINGE (ML) INJECTION EVERY 12 HOURS SCHEDULED
Status: DISCONTINUED | OUTPATIENT
Start: 2020-04-20 | End: 2020-04-20 | Stop reason: HOSPADM

## 2020-04-20 RX ORDER — HYDROXYCHLOROQUINE SULFATE 200 MG/1
400 TABLET, FILM COATED ORAL EVERY 12 HOURS
Qty: 1 TABLET | Refills: 0 | Status: CANCELLED | OUTPATIENT
Start: 2020-04-20

## 2020-04-20 RX ORDER — ACETAMINOPHEN 650 MG/1
650 SUPPOSITORY RECTAL EVERY 6 HOURS PRN
Status: DISCONTINUED | OUTPATIENT
Start: 2020-04-20 | End: 2020-04-20 | Stop reason: SDUPTHER

## 2020-04-20 RX ORDER — AZITHROMYCIN 250 MG/1
250 TABLET, FILM COATED ORAL DAILY
Qty: 4 TABLET | Refills: 0 | Status: SHIPPED | OUTPATIENT
Start: 2020-04-21 | End: 2020-04-25

## 2020-04-20 RX ORDER — ACETAMINOPHEN 325 MG/1
650 TABLET ORAL EVERY 6 HOURS PRN
Status: DISCONTINUED | OUTPATIENT
Start: 2020-04-20 | End: 2020-04-20 | Stop reason: SDUPTHER

## 2020-04-20 RX ORDER — SODIUM CHLORIDE 0.9 % (FLUSH) 0.9 %
10 SYRINGE (ML) INJECTION PRN
Status: DISCONTINUED | OUTPATIENT
Start: 2020-04-20 | End: 2020-04-20 | Stop reason: HOSPADM

## 2020-04-20 RX ADMIN — ASPIRIN 81 MG 81 MG: 81 TABLET ORAL at 07:47

## 2020-04-20 RX ADMIN — METOPROLOL TARTRATE 12.5 MG: 25 TABLET ORAL at 07:49

## 2020-04-20 RX ADMIN — LEVOTHYROXINE SODIUM 100 MCG: 100 TABLET ORAL at 04:55

## 2020-04-20 RX ADMIN — HYDROCORTISONE 10 MG: 10 TABLET ORAL at 07:30

## 2020-04-20 RX ADMIN — ATORVASTATIN CALCIUM 40 MG: 40 TABLET, FILM COATED ORAL at 07:47

## 2020-04-20 RX ADMIN — HYDROXYCHLOROQUINE SULFATE 400 MG: 200 TABLET ORAL at 10:30

## 2020-04-20 RX ADMIN — FLUDROCORTISONE ACETATE 0.1 MG: 0.1 TABLET ORAL at 07:49

## 2020-04-20 RX ADMIN — AZITHROMYCIN 500 MG: 250 TABLET, FILM COATED ORAL at 07:48

## 2020-04-20 ASSESSMENT — ENCOUNTER SYMPTOMS
ABDOMINAL PAIN: 0
VOMITING: 1
TROUBLE SWALLOWING: 0
SHORTNESS OF BREATH: 0
COLOR CHANGE: 0
BACK PAIN: 0
NAUSEA: 1
SORE THROAT: 0
WHEEZING: 0
PHOTOPHOBIA: 0
CHEST TIGHTNESS: 0
BLOOD IN STOOL: 0

## 2020-04-20 NOTE — CARE COORDINATION
4/20/20, 10:04 AM EDT  DISCHARGE PLANNING EVALUATION:    Vern Robin       Admitted from: ED 4/19/2020/ [address] day: 1   Location: [address] [place] [address] Reason for admit: Cough [R05] Status: IP  Admit order signed?: yes  PMH:  has a past medical history of Hyperlipidemia and Thyroid disease. Procedure: none  Pertinent abnormal Imaging:CXR    Impression:       1. Normal heart size. Old healed fracture both clavicles. 2. No acute findings. No infiltrates or effusions are seen. Medications:  Scheduled Meds:   sodium chloride flush  10 mL Intravenous 2 times per day    enoxaparin  40 mg Subcutaneous Daily    aspirin  81 mg Oral Daily    atorvastatin  40 mg Oral Daily    fludrocortisone  0.1 mg Oral BID    hydrocortisone  10 mg Oral BID    levothyroxine  100 mcg Oral Daily    metoprolol tartrate  12.5 mg Oral BID    hydroxychloroquine  400 mg Oral Q12H    Followed by   Michelle Nichols hydroxychloroquine  200 mg Oral Q12H    [START ON 4/21/2020] azithromycin  250 mg Oral Daily     Continuous Infusions:   Pertinent Info/Orders/Treatment Plan:  COVID-19 (+), Plaquenil 200 mg, Azithromycin, telemetry. Diet: DIET GENERAL;   Smoking status:  reports that she has been smoking cigarettes. She has a 20.00 pack-year smoking history.  She has never used smokeless tobacco.   PCP: Myrna Banda (Inactive)  Readmission 30 days or less: no  Readmission Risk Score: 16%    Discharge Planning Evaluation  Current Residence:  Private Residence  Living Arrangements:  Spouse/Significant Other   Support Systems:  Spouse/Significant Other  Current Services PTA:     Potential Assistance Needed:  N/A  Potential Assistance Purchasing Medications:  No  Does patient want to participate in local refill/ meds to beds program?  Yes  Type of Home Care Services:  None  Patient expects to be discharged to:  home  Expected Discharge date:  04/20/20  Follow Up Appointment: Best Day/ Time: Monday AM    Patient Goals/Plan/Treatment Preferences: Spoke with Amanda Bush

## 2020-04-20 NOTE — DISCHARGE SUMMARY
Discharge Summary        Patient: Garrett Lopez  YOB: 1965  MRN: [id number]   Acct: [de-identified]    Primary Care Physician: Tremayne Palma (Inactive)    Admit date  4/19/2020    Discharge date:  4/20/2020 6:57 AM    Chief Complaint on presentation :-Nausea and vomiting    Discharge Assessment and Plan:-     COVID-19 Pneumonia: ([address] room air) continue hydroxychloroquine and Zithromax at discharge. Nausea and Vomiting: (Resolved) patient tolerating food and liquids. Acute Hypoglycemia: (Resolved) blood sugar was noted to be 33 and was treated. No difficulty since admission. Patient tolerating oral intake. Non-Anion Gap Metabolic Acidosis: Likely secondary to diarrhea. S/P IV hydration. Patient tolerating oral intake. History CAD: Recent heart catheterization on 2/7/2020 noted to have nonobstructive CAD with preserved EF. Continue aspirin and Lipitor at discharge. Initial H and P and ICU course: -    (From chart review)  Sridevi De Los Santos is a 60-year-old white female who presented to [facility] on 4/19/2020 with complaints of nausea and vomiting. She she has a past medical history of thyroid disease, hyperlipidemia. 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 She does report exposure to patient with positive COVID-19. She reports cough nasal congestion mild sore throat body aches nausea vomiting diarrhea and headache. Per report she was unable to eat anything due to vomiting she reports that she slept all day yesterday and woke up feeling confused. She was admitted to [facility] for rule out and subsequently was found to be positive for COVID-19. During her time on 4K she did have a noted episode of hypoglycemia with sugars as low as 33.      Patient received IV fluid hydration overnight and reports nausea and vomiting are gone today. Patient still having small amounts of loose stools. Patient is able to eat and drink without difficulty.   She feels ready to go RDW-CV 13.6 11.5 - 14.5 %    RDW-SD 49.1 (H) 35.0 - 45.0 fL    Platelets 149 349 - 263 thou/mm3    MPV 10.1 9.4 - 12.4 fL   Anion Gap    Collection Time: 04/20/20  5:39 AM   Result Value Ref Range    Anion Gap 11.0 8.0 - 16.0 meq/L   Glomerular Filtration Rate, Estimated    Collection Time: 04/20/20  5:39 AM   Result Value Ref Range    Est, Glom Filt Rate 75 (A) ml/min/1.73m2   EKG 12 Lead    Collection Time: 04/20/20  6:26 AM   Result Value Ref Range    Ventricular Rate 72 BPM    Atrial Rate 72 BPM    P-R Interval 172 ms    QRS Duration 76 ms    Q-T Interval 442 ms    QTc Calculation (Bazett) 483 ms    P Axis 78 degrees    R Axis 71 degrees    T Axis 59 degrees        Radiology:-  Xr Chest Portable    Result Date: 4/19/2020  PROCEDURE: XR CHEST PORTABLE CLINICAL INFORMATION: cough , expose to COVID at nursing home COMPARISON: 9/28/2019 TECHNIQUE: A single mobile view of the chest was obtained. 1. Normal heart size. Old healed fracture both clavicles. 2. No acute findings. No infiltrates or effusions are seen. **This report has been created using voice recognition software. It may contain minor errors which are inherent in voice recognition technology. ** Final report electronically signed by Dr. Hugh Taylor on 4/19/2020 5:43 PM       Follow-up scheduled after discharge :-    in the next few weeks with Myrna Banda (Inactive)    Steps to help prevent the spread of COVID-19 if you are sick  SOURCE - https://erum-gera.info/. html     Stay home except to get medical care   ; Stay home: People who are mildly ill with COVID-19 are able to isolate at home during their illness.  You should restrict activities outside your home, except for getting medical care.   ; Avoid public areas: Do not go to work, school, or public areas.   ; Avoid public transportation: Avoid using public transportation, ride-sharing, or taxis.  ; Separate yourself from other people and animals in your home ; Stay away from others: As much as possible, you should stay in a specific room and away from other people in your home. Also, you should use a separate bathroom, if available.   ; Limit contact with pets & animals: You should restrict contact with pets and other animals while you are sick with COVID-19, just like you would around other people. Although there have not been reports of pets or other animals becoming sick with COVID-19, it is still recommended that people sick with COVID-19 limit contact with animals until more information is known about the virus. ; When possible, have another member of your household care for your animals while you are sick. If you are sick with COVID-19, avoid contact with your pet, including petting, snuggling, being kissed or licked, and sharing food. If you must care for your pet or be around animals while you are sick, wash your hands before and after you interact with pets and wear a facemask. See COVID-19 and Animals for more information. Other considerations   The ill person should eat/be fed in their room if possible. Non-disposable  items used should be handled with gloves and washed with hot water or in a . Clean hands after handling used  items.  If possible, dedicate a lined trash can for the ill person. Use gloves when removing garbage bags, handling, and disposing of trash. Wash hands after handling or disposing of trash.  Consider consulting with your local health department about trash disposal guidance if available. Information for Household Members and Caregivers of Someone who is Sick   Call ahead before visiting your doctor   Call ahead: If you have a medical appointment, call the healthcare provider and tell them that you have or may have COVID-19. This will help the healthcare provider's office take steps to keep other people from getting infected or exposed. Wear a facemask if you are sick   ;  If you are sick: You should wear a facemask when you are around other people (e.g., sharing a room or vehicle) or pets and before you enter a healthcare provider's office. ; If you are caring for others: If the person who is sick is not able to wear a facemask (for example, because it causes trouble breathing), then people who live with the person who is sick should not stay in the same room with them, or they should wear a facemask if they enter a room with the person who is sick. Cover your coughs and sneezes   ; Cover: Cover your mouth and nose with a tissue when you cough or sneeze.   ; Dispose: Throw used tissues in a lined trash can.   ; Wash hands: Immediately wash your hands with soap and water for at least 20 seconds or, if soap and water are not available, clean your hands with an alcohol-based hand  that contains at least 60% alcohol. Clean your hands often   ; Wash hands: Wash your hands often with soap and water for at least 20 seconds, especially after blowing your nose, coughing, or sneezing; going to the bathroom; and before eating or preparing food.   ; Hand : If soap and water are not readily available, use an alcohol-based hand  with at least 60% alcohol, covering all surfaces of your hands and rubbing them together until they feel dry.   ; Soap and water: Soap and water are the best option if hands are visibly dirty.   ; Avoid touching: Avoid touching your eyes, nose, and mouth with unwashed hands. Handwashing Tips   ; Wet your hands with clean, running water (warm or cold), turn off the tap, and apply soap.  ; Lather your hands by rubbing them together with the soap. Lather the backs of your hands, between your fingers, and under your nails. ; Scrub your hands for at least 20 seconds. Need a timer? Hum the [place] from beginning to end twice.  ; Rinse your hands well under clean, running water.  ; Dry your hands using a clean towel or air dry them.   Avoid have, or are being evaluated for COVID-19. If possible, put on a facemask before emergency medical services arrive. Consultations during this hospital stay:-  [x] NONE [] Cardiology  [] Nephrology  [] Hemo onco   [] GI   [] ID  [] Endocrine  [] Pulm    [] Neuro    [] Psych   [] Urology  [] ENT   [] G SURGERY   []Ortho    []CV surg    [] Palliative  [] Hospice [] Pain management   []    []TCU   [] PT/OT  OTHERS:-    Disposition: home  Condition at Discharge: Stable    Time Spent:- 30 minutes    Electronically signed by CHRISTA Del Angel CNP on 4/20/2020 at 6:57 AM  Discharging Intensivist  Patient seen by me. Discussed with nurse practitioner. Patient in no distress. Patient alert and oriented x4. Patient has no focal deficit. Agree with disposition home. Patient discharged home in stable and improved condition. Recommend self quarantine for 2 weeks. Electronically signed by Boni Cho Junior, MD.

## 2020-04-20 NOTE — H&P
history  Social History: Current every day smoker, 20-pack-year history. Denies EtOH use. Denies drug use. Review of Systems   Constitutional: Negative for chills, fatigue and fever. HENT: Negative for sore throat and trouble swallowing. Eyes: Negative for photophobia and visual disturbance. Respiratory: Negative for chest tightness, shortness of breath and wheezing. Cardiovascular: Negative for chest pain and leg swelling. Gastrointestinal: Positive for nausea and vomiting. Negative for abdominal pain and blood in stool. Endocrine: Negative for polydipsia and polyphagia. Genitourinary: Negative for decreased urine volume and flank pain. Musculoskeletal: Negative for back pain and neck pain. Skin: Negative for color change, pallor and rash. Allergic/Immunologic: Negative for food allergies and immunocompromised state. Neurological: Negative for dizziness, weakness and headaches. Hematological: Negative for adenopathy. Psychiatric/Behavioral: Negative for agitation and hallucinations. Scheduled Meds:   [START ON 4/20/2020] aspirin  81 mg Oral Daily    [START ON 4/20/2020] atorvastatin  40 mg Oral Daily    fludrocortisone  0.1 mg Oral BID    hydrocortisone  10 mg Oral BID    [START ON 4/20/2020] levothyroxine  100 mcg Oral Daily    metoprolol tartrate  12.5 mg Oral BID    [START ON 4/20/2020] estrogen (conjugated)-medroxyprogesterone  1 tablet Oral Daily    hydroxychloroquine  400 mg Oral Q12H    Followed by   Margaret Morrison ON 4/20/2020] hydroxychloroquine  200 mg Oral Q12H    [START ON 4/20/2020] azithromycin  500 mg Oral Daily    Followed by   Margaret Michaud ON 4/21/2020] azithromycin  250 mg Oral Daily     Continuous Infusions:   sodium chloride 100 mL/hr at 04/19/20 1722       PHYSICAL EXAMINATION:  T:  100.4. P:  85. RR:  18. B/P:  120/88. FiO2:  0 O2 Sat:  96.  I/O: 0  Body mass index is 21.95 kg/m².    GCS:   15  General:   Acutely ill-appearing white female  HEENT:

## 2020-04-20 NOTE — PLAN OF CARE
Problem: Pain:  Description: Pain management should include both nonpharmacologic and pharmacologic interventions. Goal: Pain level will decrease  Description: Pain level will decrease  Outcome: Ongoing  Note: Continue to monitor pain and give pain medication as needed. Problem: Cardiovascular  Goal: No DVT, peripheral vascular complications  Outcome: Ongoing  Note: Vitals stable, remains on telemetry. Problem: Respiratory  Goal: No pulmonary complications  Outcome: Ongoing  Note: On room air, monitor for SOB      Problem: Nutrition  Goal: Optimal nutrition therapy  Outcome: Ongoing  Note: Patient is having low blood sugars. Snack given      Problem: Discharge Planning:  Goal: Discharged to appropriate level of care  Description: Discharged to appropriate level of care  Outcome: Ongoing  Note: Planning home at discharge. Care plan reviewed with patient. Patient verbalize understanding of the plan of care and contribute to goal setting.

## 2020-04-21 ENCOUNTER — CARE COORDINATION (OUTPATIENT)
Dept: CASE MANAGEMENT | Age: 55
End: 2020-04-21

## 2020-04-22 ENCOUNTER — CARE COORDINATION (OUTPATIENT)
Dept: CASE MANAGEMENT | Age: 55
End: 2020-04-22

## 2020-04-26 ENCOUNTER — HOSPITAL ENCOUNTER (INPATIENT)
Age: 55
LOS: 1 days | Discharge: HOME | DRG: 137 | End: 2020-04-27
Attending: EMERGENCY MEDICINE | Admitting: INTERNAL MEDICINE
Payer: MEDICAID

## 2020-04-26 ENCOUNTER — APPOINTMENT (OUTPATIENT)
Dept: CT IMAGING | Age: 55
DRG: 137 | End: 2020-04-26
Payer: MEDICAID

## 2020-04-26 ENCOUNTER — APPOINTMENT (OUTPATIENT)
Dept: GENERAL RADIOLOGY | Age: 55
DRG: 137 | End: 2020-04-26
Payer: MEDICAID

## 2020-04-26 PROBLEM — J12.82 PNEUMONIA DUE TO COVID-19 VIRUS: Status: ACTIVE | Noted: 2020-04-26

## 2020-04-26 PROBLEM — U07.1 PNEUMONIA DUE TO COVID-19 VIRUS: Status: ACTIVE | Noted: 2020-04-26

## 2020-04-26 LAB
ALBUMIN SERPL-MCNC: 4.5 G/DL (ref 3.5–5.1)
ALP BLD-CCNC: 100 U/L (ref 38–126)
ALT SERPL-CCNC: 10 U/L (ref 11–66)
ANION GAP SERPL CALCULATED.3IONS-SCNC: 22 MEQ/L (ref 8–16)
AST SERPL-CCNC: 35 U/L (ref 5–40)
BASOPHILS # BLD: 0.5 %
BASOPHILS ABSOLUTE: 0 THOU/MM3 (ref 0–0.1)
BILIRUB SERPL-MCNC: 0.7 MG/DL (ref 0.3–1.2)
BUN BLDV-MCNC: 9 MG/DL (ref 7–22)
C-REACTIVE PROTEIN: 5.92 MG/DL (ref 0–1)
CALCIUM SERPL-MCNC: 9.3 MG/DL (ref 8.5–10.5)
CHLORIDE BLD-SCNC: 99 MEQ/L (ref 98–111)
CO2: 18 MEQ/L (ref 23–33)
CREAT SERPL-MCNC: 0.8 MG/DL (ref 0.4–1.2)
D-DIMER QUANTITATIVE: 1150 NG/ML FEU (ref 0–500)
EOSINOPHIL # BLD: 3.5 %
EOSINOPHILS ABSOLUTE: 0.2 THOU/MM3 (ref 0–0.4)
ERYTHROCYTE [DISTWIDTH] IN BLOOD BY AUTOMATED COUNT: 13.4 % (ref 11.5–14.5)
ERYTHROCYTE [DISTWIDTH] IN BLOOD BY AUTOMATED COUNT: 45.7 FL (ref 35–45)
FERRITIN: 229 NG/ML (ref 10–291)
GFR SERPL CREATININE-BSD FRML MDRD: 75 ML/MIN/1.73M2
GLUCOSE BLD-MCNC: 114 MG/DL (ref 70–108)
GLUCOSE BLD-MCNC: 134 MG/DL (ref 70–108)
GLUCOSE BLD-MCNC: 207 MG/DL (ref 70–108)
GLUCOSE BLD-MCNC: 263 MG/DL (ref 70–108)
GLUCOSE BLD-MCNC: 29 MG/DL (ref 70–108)
GLUCOSE BLD-MCNC: 36 MG/DL (ref 70–108)
GLUCOSE BLD-MCNC: 81 MG/DL (ref 70–108)
HCT VFR BLD CALC: 49.9 % (ref 37–47)
HEMOGLOBIN: 16.2 GM/DL (ref 12–16)
IMMATURE GRANS (ABS): 0.01 THOU/MM3 (ref 0–0.07)
IMMATURE GRANULOCYTES: 0.2 %
LACTIC ACID: 2 MMOL/L (ref 0.5–2.2)
LD: 254 U/L (ref 100–190)
LYMPHOCYTES # BLD: 56 %
LYMPHOCYTES ABSOLUTE: 2.4 THOU/MM3 (ref 1–4.8)
MCH RBC QN AUTO: 30.3 PG (ref 26–33)
MCHC RBC AUTO-ENTMCNC: 32.5 GM/DL (ref 32.2–35.5)
MCV RBC AUTO: 93.3 FL (ref 81–99)
MONOCYTES # BLD: 6.5 %
MONOCYTES ABSOLUTE: 0.3 THOU/MM3 (ref 0.4–1.3)
NUCLEATED RED BLOOD CELLS: 0 /100 WBC
OSMOLALITY CALCULATION: 272.4 MOSMOL/KG (ref 275–300)
PLATELET # BLD: 216 THOU/MM3 (ref 130–400)
PMV BLD AUTO: 10.9 FL (ref 9.4–12.4)
POTASSIUM REFLEX MAGNESIUM: 4.2 MEQ/L (ref 3.5–5.2)
RBC # BLD: 5.35 MILL/MM3 (ref 4.2–5.4)
SEG NEUTROPHILS: 33.3 %
SEGMENTED NEUTROPHILS ABSOLUTE COUNT: 1.4 THOU/MM3 (ref 1.8–7.7)
SODIUM BLD-SCNC: 139 MEQ/L (ref 135–145)
T4 FREE: 0.87 NG/DL (ref 0.93–1.76)
TOTAL PROTEIN: 7.3 G/DL (ref 6.1–8)
TROPONIN T: < 0.01 NG/ML
TROPONIN T: < 0.01 NG/ML
TSH SERPL DL<=0.05 MIU/L-ACNC: 0.06 UIU/ML (ref 0.4–4.2)
WBC # BLD: 4.3 THOU/MM3 (ref 4.8–10.8)

## 2020-04-26 PROCEDURE — 93005 ELECTROCARDIOGRAM TRACING: CPT | Performed by: NURSE PRACTITIONER

## 2020-04-26 PROCEDURE — 86140 C-REACTIVE PROTEIN: CPT

## 2020-04-26 PROCEDURE — G0378 HOSPITAL OBSERVATION PER HR: HCPCS

## 2020-04-26 PROCEDURE — 84484 ASSAY OF TROPONIN QUANT: CPT

## 2020-04-26 PROCEDURE — 93005 ELECTROCARDIOGRAM TRACING: CPT | Performed by: EMERGENCY MEDICINE

## 2020-04-26 PROCEDURE — 6370000000 HC RX 637 (ALT 250 FOR IP): Performed by: INTERNAL MEDICINE

## 2020-04-26 PROCEDURE — 84480 ASSAY TRIIODOTHYRONINE (T3): CPT

## 2020-04-26 PROCEDURE — 85025 COMPLETE CBC W/AUTO DIFF WBC: CPT

## 2020-04-26 PROCEDURE — 2580000003 HC RX 258: Performed by: EMERGENCY MEDICINE

## 2020-04-26 PROCEDURE — 96374 THER/PROPH/DIAG INJ IV PUSH: CPT

## 2020-04-26 PROCEDURE — 80053 COMPREHEN METABOLIC PANEL: CPT

## 2020-04-26 PROCEDURE — 71045 X-RAY EXAM CHEST 1 VIEW: CPT

## 2020-04-26 PROCEDURE — 96361 HYDRATE IV INFUSION ADD-ON: CPT

## 2020-04-26 PROCEDURE — 83605 ASSAY OF LACTIC ACID: CPT

## 2020-04-26 PROCEDURE — 84443 ASSAY THYROID STIM HORMONE: CPT

## 2020-04-26 PROCEDURE — 84439 ASSAY OF FREE THYROXINE: CPT

## 2020-04-26 PROCEDURE — 6360000004 HC RX CONTRAST MEDICATION: Performed by: NURSE PRACTITIONER

## 2020-04-26 PROCEDURE — 82948 REAGENT STRIP/BLOOD GLUCOSE: CPT

## 2020-04-26 PROCEDURE — 99222 1ST HOSP IP/OBS MODERATE 55: CPT | Performed by: INTERNAL MEDICINE

## 2020-04-26 PROCEDURE — 82533 TOTAL CORTISOL: CPT

## 2020-04-26 PROCEDURE — 83615 LACTATE (LD) (LDH) ENZYME: CPT

## 2020-04-26 PROCEDURE — 71275 CT ANGIOGRAPHY CHEST: CPT

## 2020-04-26 PROCEDURE — 99283 EMERGENCY DEPT VISIT LOW MDM: CPT

## 2020-04-26 PROCEDURE — 36415 COLL VENOUS BLD VENIPUNCTURE: CPT

## 2020-04-26 PROCEDURE — 85379 FIBRIN DEGRADATION QUANT: CPT

## 2020-04-26 PROCEDURE — 2580000003 HC RX 258: Performed by: INTERNAL MEDICINE

## 2020-04-26 PROCEDURE — 82728 ASSAY OF FERRITIN: CPT

## 2020-04-26 PROCEDURE — 1200000000 HC SEMI PRIVATE

## 2020-04-26 RX ORDER — HYDROXYCHLOROQUINE SULFATE 200 MG/1
200 TABLET, FILM COATED ORAL EVERY 12 HOURS
Status: DISCONTINUED | OUTPATIENT
Start: 2020-04-27 | End: 2020-04-27 | Stop reason: HOSPADM

## 2020-04-26 RX ORDER — SODIUM CHLORIDE 0.9 % (FLUSH) 0.9 %
10 SYRINGE (ML) INJECTION EVERY 12 HOURS SCHEDULED
Status: DISCONTINUED | OUTPATIENT
Start: 2020-04-26 | End: 2020-04-27 | Stop reason: HOSPADM

## 2020-04-26 RX ORDER — NICOTINE POLACRILEX 4 MG
15 LOZENGE BUCCAL PRN
Status: DISCONTINUED | OUTPATIENT
Start: 2020-04-26 | End: 2020-04-27 | Stop reason: HOSPADM

## 2020-04-26 RX ORDER — DEXTROSE AND SODIUM CHLORIDE 5; .9 G/100ML; G/100ML
INJECTION, SOLUTION INTRAVENOUS CONTINUOUS
Status: DISCONTINUED | OUTPATIENT
Start: 2020-04-26 | End: 2020-04-27

## 2020-04-26 RX ORDER — HYDROCORTISONE 10 MG/1
10 TABLET ORAL 2 TIMES DAILY
Status: DISCONTINUED | OUTPATIENT
Start: 2020-04-26 | End: 2020-04-27 | Stop reason: HOSPADM

## 2020-04-26 RX ORDER — DEXTROSE MONOHYDRATE 25 G/50ML
25 INJECTION, SOLUTION INTRAVENOUS ONCE
Status: COMPLETED | OUTPATIENT
Start: 2020-04-26 | End: 2020-04-26

## 2020-04-26 RX ORDER — 0.9 % SODIUM CHLORIDE 0.9 %
250 INTRAVENOUS SOLUTION INTRAVENOUS ONCE
Status: COMPLETED | OUTPATIENT
Start: 2020-04-26 | End: 2020-04-26

## 2020-04-26 RX ORDER — ACETAMINOPHEN 650 MG/1
650 SUPPOSITORY RECTAL EVERY 6 HOURS PRN
Status: DISCONTINUED | OUTPATIENT
Start: 2020-04-26 | End: 2020-04-27 | Stop reason: HOSPADM

## 2020-04-26 RX ORDER — HYDROXYCHLOROQUINE SULFATE 200 MG/1
200 TABLET, FILM COATED ORAL EVERY 12 HOURS
Status: DISCONTINUED | OUTPATIENT
Start: 2020-04-26 | End: 2020-04-26

## 2020-04-26 RX ORDER — POLYETHYLENE GLYCOL 3350 17 G/17G
17 POWDER, FOR SOLUTION ORAL DAILY PRN
Status: DISCONTINUED | OUTPATIENT
Start: 2020-04-26 | End: 2020-04-27 | Stop reason: HOSPADM

## 2020-04-26 RX ORDER — ONDANSETRON 2 MG/ML
4 INJECTION INTRAMUSCULAR; INTRAVENOUS EVERY 6 HOURS PRN
Status: DISCONTINUED | OUTPATIENT
Start: 2020-04-26 | End: 2020-04-27 | Stop reason: HOSPADM

## 2020-04-26 RX ORDER — DEXTROSE MONOHYDRATE 25 G/50ML
12.5 INJECTION, SOLUTION INTRAVENOUS PRN
Status: DISCONTINUED | OUTPATIENT
Start: 2020-04-26 | End: 2020-04-27 | Stop reason: HOSPADM

## 2020-04-26 RX ORDER — FLUDROCORTISONE ACETATE 0.1 MG/1
0.1 TABLET ORAL 2 TIMES DAILY
Status: DISCONTINUED | OUTPATIENT
Start: 2020-04-26 | End: 2020-04-27 | Stop reason: HOSPADM

## 2020-04-26 RX ORDER — PROMETHAZINE HYDROCHLORIDE 25 MG/1
12.5 TABLET ORAL EVERY 6 HOURS PRN
Status: DISCONTINUED | OUTPATIENT
Start: 2020-04-26 | End: 2020-04-27 | Stop reason: HOSPADM

## 2020-04-26 RX ORDER — ASPIRIN 81 MG/1
81 TABLET, CHEWABLE ORAL DAILY
Status: DISCONTINUED | OUTPATIENT
Start: 2020-04-27 | End: 2020-04-27 | Stop reason: HOSPADM

## 2020-04-26 RX ORDER — LEVOTHYROXINE SODIUM 0.1 MG/1
100 TABLET ORAL DAILY
Status: DISCONTINUED | OUTPATIENT
Start: 2020-04-27 | End: 2020-04-27 | Stop reason: HOSPADM

## 2020-04-26 RX ORDER — ACETAMINOPHEN 325 MG/1
650 TABLET ORAL EVERY 6 HOURS PRN
Status: DISCONTINUED | OUTPATIENT
Start: 2020-04-26 | End: 2020-04-27 | Stop reason: HOSPADM

## 2020-04-26 RX ORDER — ATORVASTATIN CALCIUM 40 MG/1
40 TABLET, FILM COATED ORAL DAILY
Status: DISCONTINUED | OUTPATIENT
Start: 2020-04-27 | End: 2020-04-27 | Stop reason: HOSPADM

## 2020-04-26 RX ORDER — SODIUM CHLORIDE 0.9 % (FLUSH) 0.9 %
10 SYRINGE (ML) INJECTION PRN
Status: DISCONTINUED | OUTPATIENT
Start: 2020-04-26 | End: 2020-04-27 | Stop reason: HOSPADM

## 2020-04-26 RX ORDER — DEXTROSE MONOHYDRATE 50 MG/ML
100 INJECTION, SOLUTION INTRAVENOUS PRN
Status: DISCONTINUED | OUTPATIENT
Start: 2020-04-26 | End: 2020-04-27 | Stop reason: HOSPADM

## 2020-04-26 RX ADMIN — HYDROXYCHLOROQUINE SULFATE 200 MG: 200 TABLET ORAL at 20:15

## 2020-04-26 RX ADMIN — HYDROCORTISONE 10 MG: 10 TABLET ORAL at 20:15

## 2020-04-26 RX ADMIN — IOPAMIDOL 80 ML: 755 INJECTION, SOLUTION INTRAVENOUS at 22:31

## 2020-04-26 RX ADMIN — DEXTROSE MONOHYDRATE 25 G: 500 INJECTION PARENTERAL at 15:10

## 2020-04-26 RX ADMIN — SODIUM CHLORIDE 250 ML: 9 INJECTION, SOLUTION INTRAVENOUS at 14:30

## 2020-04-26 RX ADMIN — FLUDROCORTISONE ACETATE 0.1 MG: 0.1 TABLET ORAL at 20:16

## 2020-04-26 RX ADMIN — DEXTROSE AND SODIUM CHLORIDE: 5; 900 INJECTION, SOLUTION INTRAVENOUS at 18:29

## 2020-04-26 ASSESSMENT — ENCOUNTER SYMPTOMS
CHEST TIGHTNESS: 0
ABDOMINAL PAIN: 0
DIARRHEA: 0
VOMITING: 0
VOICE CHANGE: 0
TROUBLE SWALLOWING: 0
SHORTNESS OF BREATH: 0
BACK PAIN: 0
BLOOD IN STOOL: 0
COUGH: 0
NAUSEA: 0

## 2020-04-26 ASSESSMENT — PAIN SCALES - GENERAL
PAINLEVEL_OUTOF10: 0
PAINLEVEL_OUTOF10: 0

## 2020-04-26 NOTE — H&P
Patient - Ray Coley   MRN -  [id number]   [facility]   - 1965      Date of Admission -  2020  1:27 PM  Primary Care Physician - Myrna Banda (Inactive)  Date of evaluation -  2020  Room - 21/021A   HD - 0  Problem List      Patient Active Problem List   Diagnosis    Acute metabolic encephalopathy    Hypopituitary dwarfism ([address]    Hypokalemia    Swelling of lower extremity    Chest pain    Shortness of breath    COVID-19    Pneumonia due to COVID-19 virus     Assessment    · Hypoglycemia. · Generalized weakness  · COVID 19 infection. · CAD  · Non-anion gap metabolic acidosis  · Hypothyroidism. · Adrenal insuffiencey. Plan    · Admit to [facility] COVID unit. · Continue to monitor blood glucose. · Hypoglycemia was treated, repeat blood glucose is 207. The patient has a vague history of adrenal insufficiency. I am not sure if she was really taking her medications at home. Continue home medications hydrocortisone and Florinef. · Hypoglycemia protocol. · Glucose every 2 hours. · IV fluid. · The patient finished azithromycin course. Continue Plaquenil course. · Physical therapy and occupational therapy. · Continuous pulse oximetry. · DVT prophylaxis    Chief Complaint/Reason for Admission   Generalized weakness    TIERNEY Coley is a 49 y.o. female who presented to the emergency department in [facility] with complaint of generalized weakness. The patient was diagnosed recently and admitted to [facility] on  with diagnosis of COVID 19 infection. She was a treated with azithromycin and Plaquenil and then discharged home on . The patient stated that she finished the azithromycin, and still taking the Plaquenil. The patient stated that she feels some shortness of breath, no fever or chills 0she is comfortable on room air with good saturation. Chest x-ray did not show significant infiltrate.   Her main complaint is that she feels very weak and she has poor intake. She was found to be hypoglycemic with blood glucose of 29. The patient has vague history of adrenal sufficiency and she takes Florinef of and hydrocortisone. However the patient stated that she is taking her medications. Her inflammatory markers are mildly elevated. The patient was seen and examined in the emergency department. PMHx         Diagnosis Date    Hyperlipidemia     Thyroid disease           Procedure Laterality Date    BRAIN SURGERY      2017    DIAGNOSTIC CARDIAC CATH LAB PROCEDURE       Meds       enoxaparin  1 mg/kg Subcutaneous Once         Not in a hospital admission. Allergies    Egg yolk and Morphine and related  Social History    Tobacco  Social History     Tobacco Use   Smoking Status Current Every Day Smoker    Packs/day: 0.50    Years: 40.00    Pack years: 20.00    Types: Cigarettes   Smokeless Tobacco Never Used   Tobacco Comment    down to 3 cigs a day     Alcohol  Social History     Substance and Sexual Activity   Alcohol Use Not Currently     Family History    No family history on file. ROS   Constitutional: Negative for chills, positive for fatigue  HENT: Negative for sore throat and trouble swallowing. Eyes: Negative for photophobia and visual disturbance. Respiratory: Negative for chest tightness, shortness of breath and wheezing. Cardiovascular: Negative for chest pain and leg swelling. Gastrointestinal: Positive for nausea and vomiting. Negative for abdominal pain and blood in stool. Endocrine: Negative for polydipsia and polyphagia. Genitourinary: Negative for decreased urine volume and flank pain. Musculoskeletal: Negative for back pain and neck pain. Skin: Negative for color change, pallor and rash. Allergic/Immunologic: Negative for food allergies and immunocompromised state. Neurological: Negative for dizziness, weakness and headaches. Hematological: Negative for adenopathy.    Psychiatric/Behavioral: Negative for

## 2020-04-26 NOTE — ED NOTES
Dextrose given per order for low blood sugar. Updated on POC. Denies needs at this time. Call light in reach. Lights dimmed for comfort. Will monitor.       Ben Hernández RN  04/26/20 0929

## 2020-04-27 VITALS
RESPIRATION RATE: 16 BRPM | HEART RATE: 79 BPM | TEMPERATURE: 97.6 F | SYSTOLIC BLOOD PRESSURE: 89 MMHG | WEIGHT: 122.8 LBS | BODY MASS INDEX: 22.6 KG/M2 | DIASTOLIC BLOOD PRESSURE: 66 MMHG | HEIGHT: 62 IN | OXYGEN SATURATION: 95 %

## 2020-04-27 PROBLEM — R07.9 CHEST PAIN: Status: RESOLVED | Noted: 2019-12-17 | Resolved: 2020-04-27

## 2020-04-27 PROBLEM — E87.6 HYPOKALEMIA: Status: RESOLVED | Noted: 2019-09-28 | Resolved: 2020-04-27

## 2020-04-27 PROBLEM — U07.1 COVID-19: Status: RESOLVED | Noted: 2020-04-20 | Resolved: 2020-04-27

## 2020-04-27 PROBLEM — G93.41 ACUTE METABOLIC ENCEPHALOPATHY: Status: RESOLVED | Noted: 2019-09-04 | Resolved: 2020-04-27

## 2020-04-27 PROBLEM — M79.89 SWELLING OF LOWER EXTREMITY: Status: RESOLVED | Noted: 2019-09-29 | Resolved: 2020-04-27

## 2020-04-27 LAB
ANION GAP SERPL CALCULATED.3IONS-SCNC: 9 MEQ/L (ref 8–16)
BUN BLDV-MCNC: 4 MG/DL (ref 7–22)
CALCIUM SERPL-MCNC: 8.3 MG/DL (ref 8.5–10.5)
CHLORIDE BLD-SCNC: 107 MEQ/L (ref 98–111)
CO2: 21 MEQ/L (ref 23–33)
CORTISOL COLLECTION INFO: NORMAL
CORTISOL: 19.23 UG/DL
CORTISOL: 34.78 UG/DL
CREAT SERPL-MCNC: 0.7 MG/DL (ref 0.4–1.2)
EKG ATRIAL RATE: 90 BPM
EKG ATRIAL RATE: 98 BPM
EKG P AXIS: 82 DEGREES
EKG P AXIS: 84 DEGREES
EKG P-R INTERVAL: 162 MS
EKG P-R INTERVAL: 164 MS
EKG Q-T INTERVAL: 372 MS
EKG Q-T INTERVAL: 390 MS
EKG QRS DURATION: 84 MS
EKG QRS DURATION: 84 MS
EKG QTC CALCULATION (BAZETT): 474 MS
EKG QTC CALCULATION (BAZETT): 477 MS
EKG R AXIS: 82 DEGREES
EKG R AXIS: 90 DEGREES
EKG T AXIS: -71 DEGREES
EKG T AXIS: 23 DEGREES
EKG VENTRICULAR RATE: 90 BPM
EKG VENTRICULAR RATE: 98 BPM
GFR SERPL CREATININE-BSD FRML MDRD: 87 ML/MIN/1.73M2
GLUCOSE BLD-MCNC: 142 MG/DL (ref 70–108)
GLUCOSE BLD-MCNC: 145 MG/DL (ref 70–108)
GLUCOSE BLD-MCNC: 155 MG/DL (ref 70–108)
GLUCOSE BLD-MCNC: 160 MG/DL (ref 70–108)
GLUCOSE BLD-MCNC: 169 MG/DL (ref 70–108)
GLUCOSE BLD-MCNC: 189 MG/DL (ref 70–108)
GLUCOSE BLD-MCNC: 218 MG/DL (ref 70–108)
GLUCOSE BLD-MCNC: 226 MG/DL (ref 70–108)
GLUCOSE BLD-MCNC: 230 MG/DL (ref 70–108)
POTASSIUM SERPL-SCNC: 4.7 MEQ/L (ref 3.5–5.2)
SODIUM BLD-SCNC: 137 MEQ/L (ref 135–145)
T3 TOTAL: 52 NG/DL (ref 72–181)
TROPONIN T: < 0.01 NG/ML

## 2020-04-27 PROCEDURE — 99232 SBSQ HOSP IP/OBS MODERATE 35: CPT | Performed by: INTERNAL MEDICINE

## 2020-04-27 PROCEDURE — 6370000000 HC RX 637 (ALT 250 FOR IP): Performed by: NURSE PRACTITIONER

## 2020-04-27 PROCEDURE — APPSS45 APP SPLIT SHARED TIME 31-45 MINUTES: Performed by: NURSE PRACTITIONER

## 2020-04-27 PROCEDURE — 6370000000 HC RX 637 (ALT 250 FOR IP): Performed by: INTERNAL MEDICINE

## 2020-04-27 PROCEDURE — 80048 BASIC METABOLIC PNL TOTAL CA: CPT

## 2020-04-27 PROCEDURE — 84484 ASSAY OF TROPONIN QUANT: CPT

## 2020-04-27 PROCEDURE — 36415 COLL VENOUS BLD VENIPUNCTURE: CPT

## 2020-04-27 PROCEDURE — 2580000003 HC RX 258: Performed by: INTERNAL MEDICINE

## 2020-04-27 PROCEDURE — 82948 REAGENT STRIP/BLOOD GLUCOSE: CPT

## 2020-04-27 PROCEDURE — G0378 HOSPITAL OBSERVATION PER HR: HCPCS

## 2020-04-27 PROCEDURE — 93010 ELECTROCARDIOGRAM REPORT: CPT | Performed by: INTERNAL MEDICINE

## 2020-04-27 PROCEDURE — 94760 N-INVAS EAR/PLS OXIMETRY 1: CPT

## 2020-04-27 PROCEDURE — 96361 HYDRATE IV INFUSION ADD-ON: CPT

## 2020-04-27 PROCEDURE — 82533 TOTAL CORTISOL: CPT

## 2020-04-27 RX ADMIN — ASPIRIN 81 MG 81 MG: 81 TABLET ORAL at 07:59

## 2020-04-27 RX ADMIN — DEXTROSE AND SODIUM CHLORIDE: 5; 900 INJECTION, SOLUTION INTRAVENOUS at 08:02

## 2020-04-27 RX ADMIN — LEVOTHYROXINE SODIUM 100 MCG: 100 TABLET ORAL at 06:08

## 2020-04-27 RX ADMIN — HYDROXYCHLOROQUINE SULFATE 200 MG: 200 TABLET ORAL at 08:04

## 2020-04-27 RX ADMIN — FLUDROCORTISONE ACETATE 0.1 MG: 0.1 TABLET ORAL at 08:04

## 2020-04-27 RX ADMIN — HYDROCORTISONE 10 MG: 10 TABLET ORAL at 08:04

## 2020-04-27 RX ADMIN — ATORVASTATIN CALCIUM 40 MG: 40 TABLET, FILM COATED ORAL at 08:04

## 2020-04-27 ASSESSMENT — PAIN SCALES - GENERAL
PAINLEVEL_OUTOF10: 0

## 2020-04-27 NOTE — PROGRESS NOTES
Beth Christy 61  OCCUPATIONAL THERAPY MISSED TREATMENT NOTE  [facility] 4B  4B-07/007-A      Date: 2020  Patient Name: Pinky Restrepo        CSN: [id number]   : 1965  (49 y.o.)  Gender: female                REASON FOR MISSED TREATMENT:   Per RN, not anticipating discharge needs of OT at this time. Will defer OT eval at this time, please re-consult should needs arise.
Pt received in 4b07 from ed . Awake , alert and oriented . Moves all 4 extremities to command  . On room air . 1940 ekg from ed noted with t wave inversion  V1- v6 . Pt denies chest pain or shortness of breath - informed Alcon agarwal repeat ekg   ordered .
29.  The patient has vague history of adrenal sufficiency and she takes Florinef of and hydrocortisone. However the patient stated that she is taking her medications. Her inflammatory markers are mildly elevated. The patient was seen and examined in the emergency department. Subjective:- (Last 24 hours)    Patient reports she is feeling better. No fever, some chills. No dyspnea, cough, weakness, nausea/vomiting/diarrhea. Tolerating oral intake. Past medical history, family history, social history and allergies reviewed again and is unchanged since admission. ROS (12 point review of systems completed. Pertinent positives noted. Otherwise ROS is negative)      Scheduled Meds:   aspirin  81 mg Oral Daily    atorvastatin  40 mg Oral Daily    estrogen (conjugated)-medroxyprogesterone  1 tablet Oral Daily    fludrocortisone  0.1 mg Oral BID    hydrocortisone  10 mg Oral BID    levothyroxine  100 mcg Oral Daily    metoprolol tartrate  12.5 mg Oral BID    sodium chloride flush  10 mL Intravenous 2 times per day    enoxaparin  40 mg Subcutaneous Q24H    hydroxychloroquine  200 mg Oral Q12H     Continuous Infusions:   dextrose      dextrose 5 % and 0.9 % NaCl 75 mL/hr at 04/27/20 0802     PRN Meds:.sodium chloride flush, acetaminophen **OR** acetaminophen, polyethylene glycol, promethazine **OR** ondansetron, glucose, dextrose, glucagon (rDNA), dextrose     PHYSICAL EXAMINATION:  Vital signs: Temperature 98, respirations 18, pulse 95, blood pressure 99/80, pulse ox 97% on room air. Intake: 370 mL's Output: 450 mL's  Body mass index is 22.46 kg/m². General:   Cachectic  female resting in bed in no apparent distress. HEENT:  normocephalic and atraumatic. No scleral icterus. PERR  Neck: supple. No thyromegaly. Lungs: clear to auscultation, right greater than left bibasilar diminished without wheezes/rhonchi/rales. No retractions or tachypnea. Cardiac: RRR. No JVD. Abdomen: soft.

## 2020-04-27 NOTE — FLOWSHEET NOTE
04/27/20 1108   Provider Notification   Reason for Communication Review case   Provider Name Dr. Kiarra Turcios   Provider Notification Physician   Method of Communication Call   Response See orders   Notification Time 12     Dr. Kiarra Turcios notified of patient's low BP's but patient not symptomatic and MAP is greater than 65. Also notified him that blood sugars are now 226 and D5.9 is running at 75 ml/hr. Orders to decreased rate to 25 ml/hr.

## 2020-04-27 NOTE — FLOWSHEET NOTE
04/27/20 1431   Encounter Summary   Services provided to: Patient   Referral/Consult From: Nurse   Continue Visiting Yes  (4/27 AD info. )   Complexity of Encounter Low   Length of Encounter 15 minutes   Spiritual/Holiness   Type Spiritual support   Advance Directives (For Healthcare)   Healthcare Directive No, patient does not have an advance directive for healthcare treatment   Information on [place] Requested No   Patient Requests Assistance Yes, referral made to    Advance Directives Documents given   Advance Directive Consult: Advance Directive materials were provided to patient and explained. Patient is not ready to complete at this time, gave information for Spiritual Care to be contacted if further assistance is needed. Called the unit to ask the nurse to deliver the AD to pt. Will call pt for explanation.

## 2020-04-27 NOTE — CARE COORDINATION
Services PTA:     Potential Assistance Needed:  N/A  Potential Assistance Purchasing Medications:  No  Does patient want to participate in local refill/ meds to beds program?  Yes  Type of Home Care Services:  None  Patient expects to be discharged to:  Home  Expected Discharge date:  04/29/20  Follow Up Appointment: Best Day/ Time: Monday AM    Patient Goals/Plan/Treatment Preferences: Pt lives at home and plans to return there. Pt is set up already for follow up with [facility] office (Dr. Joao Coon) from previous stay. Anticipate discharge home. Transportation/Food Security/Housekeeping Addressed:  No issues identified.     Evaluation: no

## 2020-04-27 NOTE — ED PROVIDER NOTES
conjugated,-medroxyprogesterone (PREMPRO) 0.3-1.5 MG per tablet Take 1 tablet by mouth daily      fludrocortisone (FLORINEF) 0.1 MG tablet Take 0.1 mg by mouth 2 times daily      levothyroxine (SYNTHROID) 100 MCG tablet Take 1 tablet by mouth Daily  Qty: 30 tablet, Refills: 1      hydrocortisone (CORTEF) 5 MG tablet Take 10 mg by mouth 2 times daily              ALLERGIES     Egg yolk and Morphine and related    FAMILY HISTORY     No family history on file. SOCIAL HISTORY       Social History     Socioeconomic History    Marital status: Legally      Spouse name: None    Number of children: 5    Years of education: None    Highest education level: None   Occupational History    None   Social Needs    Financial resource strain: None    Food insecurity     Worry: None     Inability: None    Transportation needs     Medical: None     Non-medical: None   Tobacco Use    Smoking status: Current Every Day Smoker     Packs/day: 0.50     Years: 40.00     Pack years: 20.00     Types: Cigarettes    Smokeless tobacco: Never Used    Tobacco comment: down to 3 cigs a day   Substance and Sexual Activity    Alcohol use: Not Currently    Drug use: Never    Sexual activity: Not Currently   Lifestyle    Physical activity     Days per week: None     Minutes per session: None    Stress: None   Relationships    Social connections     Talks on phone: None     Gets together: None     Attends Rastafari service: None     Active member of club or organization: None     Attends meetings of clubs or organizations: None     Relationship status: None    Intimate partner violence     Fear of current or ex partner: None     Emotionally abused: None     Physically abused: None     Forced sexual activity: None   Other Topics Concern    None   Social History Narrative    None       REVIEW OF SYSTEMS     Review of Systems   Constitutional: Positive for fatigue. Negative for chills, diaphoresis and fever.    HENT: Negative distress. Breath sounds: No stridor. No wheezing or rales. Abdominal:      General: Bowel sounds are normal. There is no distension. Palpations: Abdomen is soft. There is no mass. Tenderness: There is no abdominal tenderness. There is no guarding or rebound. Comments: Negative Schulte's sign  Nontender McBurney's Point  Negative Rovsig's sign  No bruising or echymosis of abdomen   Musculoskeletal:         General: No tenderness. Comments: Negative Federico's Sign bilaterally   Lymphadenopathy:      Cervical: No cervical adenopathy. Skin:     General: Skin is warm and dry. Coloration: Skin is not pale. Findings: No erythema or rash. Neurological:      Mental Status: She is alert and oriented to person, place, and time. Cranial Nerves: No cranial nerve deficit. Motor: No abnormal muscle tone. Coordination: Coordination normal.      Comments: No nystagmus  GEneralized diffuse weakness     Psychiatric:         Behavior: Behavior normal.         Thought Content: Thought content normal.         DIAGNOSTIC RESULTS     EKG:(none if blank)  All EKG's are interpreted [facility] who either signs or Co-signs this chart in the absence of a cardiologist.    Sinus rhythm with multiple st depressions and twave inversions    RADIOLOGY: (none if blank)   Interpretation per the Radiologistbelow, if available at the time of this note:    XR CHEST PORTABLE   Final Result   No acute cardiopulmonary disease. **This report has been created using voice recognition software. It may contain minor errors which are inherent in voice recognition technology. **      Final report electronically signed by Dr. Elke Peñaloza on 4/26/2020 2:29 PM          LABS:  Labs Reviewed   CBC WITH AUTO DIFFERENTIAL - Abnormal; Notable for the following components:       Result Value    WBC 4.3 (*)     Hemoglobin 16.2 (*)     Hematocrit 49.9 (*)     RDW-SD 45.7 (*)     Segs that may have been sent were not resulted at the time of this patient visit. EMERGENCY DEPARTMENT COURSE andMedical Decision Making:     MDM/  ED Course as of Apr 26 2005   Sun Apr 26, 2020   1428 Cxr reviewed no infiltrate  EKG grossly abnormal though no ST elevation    [AB]   1505 Discussed w/ Dr Maria Esther Alvarenga [facility] hospitalist team who accepts pt for admission; agrees with therapeutic  Lovenox and no CT at this time given pt known Covid +    [AB]      ED Course User Index  [AB] Fátima Cooper DO       ED Medications administered this visit:    Lovenox      Procedures: (None if blank)       CLINICAL       1. COVID-19    2. Hypoglycemia    3. Generalized weakness    4. Abnormal EKG    5.  Elevated d-dimer          DISPOSITION/PLAN   DISPOSITION Admitted 04/26/2020 04:54:27 PM      PATIENT REFERRED TO:  Michael Kendall            DISCHARGE MEDICATIONS:  Current Discharge Medication List                 (Please note that portions of this note were completed with a voice recognition program.  Efforts were made to edit the dictations but occasionallywords are mis-transcribed.)      Ritchie Nielson DO,FACEP (electronically signed)  Attending Physician, Emergency [address] [place], [place] [address]DO  04/26/20 2014       Fátima Cooper DO  04/29/20 [id number]

## 2020-04-27 NOTE — DISCHARGE INSTR - DIET
 Good nutrition is important when healing from an illness, injury, or surgery. Follow any nutrition recommendations given to you during your hospital stay.  If you were given an oral nutrition supplement while in the hospital, continue to take this supplement at home. You can take it with meals, in-between meals, and/or before bedtime. These supplements can be purchased at most local grocery stores, pharmacies, and chain super-stores.  If you have any questions about your diet or nutrition, call the hospital and ask for the dietitian.  Be sure to eat small frequent meals throughout the day and combine protein and carbs with meals.

## 2020-04-28 ENCOUNTER — CARE COORDINATION (OUTPATIENT)
Dept: CASE MANAGEMENT | Age: 55
End: 2020-04-28

## 2020-04-30 RX ORDER — LEVOTHYROXINE SODIUM 0.1 MG/1
100 TABLET ORAL DAILY
Qty: 30 TABLET | Refills: 1 | OUTPATIENT
Start: 2020-04-30

## 2020-05-01 ENCOUNTER — TELEPHONE (OUTPATIENT)
Dept: ADMINISTRATIVE | Age: 55
End: 2020-05-01

## 2020-05-04 ENCOUNTER — OFFICE VISIT (OUTPATIENT)
Dept: PRIMARY CARE CLINIC | Age: 55
End: 2020-05-04
Payer: MEDICAID

## 2020-05-04 PROCEDURE — G8420 CALC BMI NORM PARAMETERS: HCPCS | Performed by: NURSE PRACTITIONER

## 2020-05-04 PROCEDURE — 99211 OFF/OP EST MAY X REQ PHY/QHP: CPT | Performed by: NURSE PRACTITIONER

## 2020-05-04 PROCEDURE — G8428 CUR MEDS NOT DOCUMENT: HCPCS | Performed by: NURSE PRACTITIONER

## 2020-05-06 LAB — SARS-COV-2: NOT DETECTED

## 2020-05-07 ENCOUNTER — OFFICE VISIT (OUTPATIENT)
Dept: PRIMARY CARE CLINIC | Age: 55
End: 2020-05-07
Payer: MEDICAID

## 2020-05-07 PROCEDURE — G8428 CUR MEDS NOT DOCUMENT: HCPCS | Performed by: NURSE PRACTITIONER

## 2020-05-07 PROCEDURE — G8420 CALC BMI NORM PARAMETERS: HCPCS | Performed by: NURSE PRACTITIONER

## 2020-05-07 PROCEDURE — 99211 OFF/OP EST MAY X REQ PHY/QHP: CPT | Performed by: NURSE PRACTITIONER

## 2020-05-07 NOTE — PATIENT INSTRUCTIONS
information about the virus outbreaks. WHO also has travel advice. www.who.int  Current as of: April 24, 2020               Content Version: 12.4  © 2006-2020 Healthwise, Incorporated. Care instructions adapted under license by your healthcare professional. If you have questions about a medical condition or this instruction, always ask your healthcare professional. [address] any warranty or liability for your use of this information.

## 2020-05-09 LAB — SARS-COV-2: NOT DETECTED

## 2021-01-13 ENCOUNTER — TELEPHONE (OUTPATIENT)
Dept: CARDIOLOGY CLINIC | Age: 56
End: 2021-01-13

## 2021-01-13 NOTE — LETTER
[facility]  [facility].  [address] [address]  Phone: [phone]  Fax: [phone]      January 13, 2021     [address] [address]      Dear Faina Cortez:    I am writing you because I have been informed of your missed appointment(s). We care about you and the management of your healthcare and want to make sure that you follow up as recommended. We're sorry you were unable to keep your appointment and hope that you are doing well. We would like to continue treating your healthcare needs. Please call the office to let us know your plans for treatment and to reschedule your appointment.      Sincerely,        Mirella Tran MD

## 2022-08-02 NOTE — FLOWSHEET NOTE
09/05/19 1600   Encounter Summary   Services provided to: Patient   Referral/Consult From: [email or web address] [facility]   Continue Visiting Yes  (9/5)   Complexity of Encounter High   Length of Encounter 30 minutes   Spiritual Assessment Completed Yes   Advance Care Planning Yes   Grief and Life Adjustment   Type New Diagnosis   Assessment Approachable   Intervention Discussed illness/injury and it's impact; Discussed relationship with God;Nurtured hope;Explored feelings, thoughts, concerns   Outcome Expressed gratitude;Engaged in conversation;Receptive;Encouraged     Assessment: The patient is a 49 yr old female who entered care for confusion. According to the patient, she did not recognize anything about where she was or why she was here. She did not recognize her phone nor her bag of clothing, but did recognize her purse. This staff assisted in getting hold of her daughter by going through her phone and located the name the patient referred to as her. A call was placed, the daughter was on speaker phone, and requested that the patient's nurse call and explain her care. - This was then relayed to the nurse [facility]) who assured me she would reach out to her. The daughters number was also placed in the patient's chart by her nurse so we have record of an emergency contact. - This staff asked the nurse to also place a social work consult for follow-up as the daughter indicated Thony Humphries is not able to care for herself, and she is in [place]. \" Consequently any assistance we can provide would be appreciated.      Plan: Continued support will be provided by the spiritual care team. CARDIOLOGY CONSULTATION    Patient Name:  Jake Lewis    :  1926    Reason for Consultation:   Aortic valve stenosis/cardiomegaly/atrial fibrillation    History of Present Illness:   Jake Lewis presents to [facility], following history of developing COVID symptoms during the last week of July which she received monoclonal antibodies on 2022. Unfortunately there is not much improvement and for this reason family brought her to the hospital having moderately severe symptoms related to [place]. I was asked to see her in consultation as she follows with a cardiologist in [place] and has known aortic valve stenosis as well as recurrent persistent atrial fibrillation for which she has been on amiodarone and apixaban. Up into this point she has been quite alert and apparently somewhat independent. Presently she admits to having mild shortness of breath but simply feels grossly fatigue. A CTA pulmonary angiogram did not demonstrate any evidence for pulmonary emboli however there was evidence of cardiomegaly and suggestion of underlying pulmonary artery hypertension as well as calcification of the aortic valve. Likewise her proBNP is mildly elevated. At 1363 pg/mL    Past Medical History:   has a past medical history of CKD (chronic kidney disease) stage 3, GFR 30-59 ml/min ([address] Pulmonary hypertension ([address] and UTI (urinary tract infection) with pyuria. Surgical History:   has no past surgical history on file. Social History:   reports that she has never smoked. She has never been exposed to tobacco smoke. She has never used smokeless tobacco. She reports that she does not drink alcohol and does not use drugs. Family History:  family history is not on file. Medications:  Prior to Admission medications    Medication Sig Start Date End Date Taking?  Authorizing Provider   apixaban (ELIQUIS) 2.5 MG TABS tablet Take 2.5 mg by mouth in the morning and 2.5 mg before bedtime. Yes Historical Provider, MD   levothyroxine (SYNTHROID) 50 MCG tablet Take 50 mcg by mouth in the morning. Yes Historical Provider, MD   amiodarone (PACERONE) 100 MG tablet Take 100 mg by mouth in the morning. Yes Historical Provider, MD   Multiple Vitamins-Minerals (OCUVITE ADULT 50+) CAPS Take 1 tablet by mouth daily   Yes Historical Provider, MD   vitamin B-12 (CYANOCOBALAMIN) 500 MCG tablet Take 500 mcg by mouth in the morning. Yes Historical Provider, MD   Cholecalciferol (VITAMIN D3) 125 MCG (5000 UT) TABS Take 1 tablet by mouth in the morning. Yes Historical Provider, MD   potassium chloride (MICRO-K) 10 MEQ extended release capsule Take 10 mEq by mouth Twice a Week Monday and Friday wth the lasix   Yes Historical Provider, MD   furosemide (LASIX) 20 MG tablet Take 20 mg by mouth Twice a Week Monday and fridays   Yes Historical Provider, MD   Flaxseed, Linseed, (FLAXSEED OIL) 1200 MG CAPS Take 1 tablet by mouth daily   Yes Historical Provider, MD       Allergies:  Patient has no known allergies. Review of Systems:   Constitutional: there has been no unanticipated weight loss. There's been a significant change in energy and activity level, but not sleep pattern . Has experienced recent fever chills or rigors. Eyes: No visual changes or diplopia. No scleral icterus. ENT: No Headaches, hearing loss or vertigo. No mouth sores or sore throat. No change in taste or smell. Cardiovascular: No chest discomfort, + dyspnea on exertion, denies palpitations, loss of consciousness, no phlebitis, no claudication. Respiratory: Has cough but no wheezing, no sputum production. No hemoptysis, pleuritic pain. Gastrointestinal: No abdominal pain, appetite loss, blood in stools. No change in bowel habits. No hematemesis  Genitourinary: No dysuria, trouble voiding or hematuria. No nocturia or increased frequency. Musculoskeletal:  No gait disturbance, weakness or joint complaints.   Integumentary: No rash or pruritis. Neurological: No headache, diplopia, change in muscle strength, numbness or tingling. No change in gait, balance, coordination, mood, affect, memory, mentation, behavior. Psychiatric: No anxiety or depression. Endocrine: No temperature intolerance. No excessive thirst, fluid intake, or urination. No tremor. Hematologic/Lymphatic: No abnormal bruising or bleeding, blood clots or swollen lymph nodes. Allergic/Immunologic: No nasal congestion or hives. Physical Examination:    Vital Signs: /60   Pulse 52   Temp 97.8 °F (36.6 °C) (Oral)   Resp 18   Ht 5' 3\" (1.6 m)   Wt 172 lb 9.6 oz (78.3 kg)   SpO2 95%   BMI 30.57 kg/m²   General appearance: Well preserved, mesomorphic body habitus, alert, no distress. Skin: Skin color, texture, turgor normal. No rashes or lesions. No induration or tightening palpated. Head: Normocephalic. No masses, lesions, tenderness or abnormalities  Eyes: Conjunctivae/corneas clear. PERRL, EOMs intact. Sclera non icteric. Ears: External ears normal. Canals clear. TM's clear bilaterally. Hearing normal to finger rub. Nose/Sinuses: Nares normal. Septum midline. Mucosa normal. No drainage or sinus tenderness. Oropharynx: Lips, mucosa, and tongue normal. Oropharynx clear with no exudate seen. Neck: Neck supple and symmetric. No adenopathy. Thyroid symmetric, normal size, without nodules. Trachea is midline. Carotids brisk in upstroke without bruits, no abnormal JVP noted at 45°. Chest: Even excursion  Lungs: Lungs with few scattered expiratory sonorous rhonchi to auscultation bilaterally. No retractions or use of accessory muscles. No tactile vocal fremitus. , crackles or rales. Heart:  S1 > S2. Regular rhythm. S4 gallop grade 9-6/7 harsh systolic ejection murmur second right intercostal space radiating to the carotids as well as to the apex. No rub, palpable thrill or heave noted. PMI 5th intercostal space midclavicular line.   Abdomen: Abdomen soft, mildly protuberant, non-tender. BS normal. No masses, organomegaly. No hernia noted. Extremities: Extremities normal. No deformities, edema, or skin discoloration. No cyanosis or clubbing noted to the nails. Peripheral pulses present 2+ upper extremities and present 1+  lower extremities. Musculoskeletal: Spine ROM normal. Muscular strength intact. Neuro: Cranial nerves intact. Motor: Strength 5/5 in all extremities. Reflexes 2+ in all extremities. No focal weakness. Sensory: grossly normal to touch. Coordination intact. Pertinent Labs:  CBC:   Recent Labs     08/01/22  1529 08/02/22  0430   WBC 9.1 7.9   HGB 14.2 12.8    258     BMP:  Recent Labs     08/01/22  1529 08/01/22  1744 08/02/22  0430    140 141   K 4.1 4.3 3.9    107 109*   CO2 20* 20* 21*   BUN 21 20 16   CREATININE 1.2* 1.1* 1.1*   GLUCOSE 94 91 84   LABGLOM 42 46 46     ABGs: No results found for: PH, PO2, PCO2  INR: No results for input(s): INR in the last 72 hours. PRO-BNP:   Lab Results   Component Value Date    PROBNP 1,363 (H) 08/02/2022    PROBNP 943 (H) 08/01/2022      Cardiac Injury Profile:   Recent Labs     08/01/22  1744 08/01/22  2353 08/02/22  0430   TROPHS 14* 17* 16*      Lipid Profile:   Lab Results   Component Value Date/Time    TRIG 67 08/02/2022 04:30 AM    HDL 53 08/02/2022 04:30 AM    LDLCALC 111 08/02/2022 04:30 AM    CHOL 177 08/02/2022 04:30 AM      Thyroid:   Lab Results   Component Value Date    TSH 1.350 08/02/2022      Hemoglobin A1C: No components found for: HGBA1C   ECG:  See report    Radiology:  CTA PULMONARY W CONTRAST   Final Result   No evidence of pulmonary embolism or acute pulmonary abnormality. Cardiomegaly with dilated main pulmonary artery suggestive of pulmonary   arterial hypertension. XR CHEST (2 VW)   Final Result   No radiographic evidence of acute cardiopulmonary disease.            Assessment:    Patient Active Problem List   Diagnosis Code    MAZIN (acute kidney injury) ([address] N17.9    UTI (urinary tract infection) with pyuria N39.0    Pulmonary hypertension (HCC) I27.20    CKD (chronic kidney disease) stage 3, GFR 30-59 ml/min (HCC) N18.30       Plan: At this time I will review her information sent over from her [facility] cardiologist office and review and will discuss the relative options available to her. In the interim utilize small dose of diuretic as needed and maintain anticoagulation with a history of atrial fibrillation as well as her amiodarone. I would suspect that the majority of her pulmonary hypertension is related to her underlying calcific aortic valve stenosis which per auscultation sounds severe. As she is cognitively bright I see no reason that she could not be a potential candidate for TAVR but would defer this decision to the patient, her family and her primary cardiologist.    I have spent more than 55 minutes face to face with Leatha Tripathi reviewing notes and laboratory data with greater than 50% of this time instructing and counseling the patient and family members regarding my findings and recommendations and I have answered all questions as posed to me by Ms. Rascon and her family members present. Thank you, Clare Denney DO and Karl Ding DO for allowing me to consult in the care of this patient. Marii Palomino DO , FACP, [facility], [facility]    NOTE:  This report was transcribed using voice recognition software. Every effort was made to ensure accuracy; however, inadvertent computerized transcription errors may be present.

## 2023-10-11 ENCOUNTER — NURSING HOME VISIT (OUTPATIENT)
Dept: POST ACUTE CARE | Facility: EXTERNAL LOCATION | Age: 58
End: 2023-10-11
Payer: COMMERCIAL

## 2023-10-11 DIAGNOSIS — E16.2 HYPOGLYCEMIA: ICD-10-CM

## 2023-10-11 DIAGNOSIS — G40.909 SEIZURE DISORDER (MULTI): ICD-10-CM

## 2023-10-11 DIAGNOSIS — E27.40 ADRENAL INSUFFICIENCY (MULTI): ICD-10-CM

## 2023-10-11 DIAGNOSIS — I10 ESSENTIAL (PRIMARY) HYPERTENSION: Primary | ICD-10-CM

## 2023-10-11 DIAGNOSIS — E03.9 HYPOTHYROIDISM, ACQUIRED: ICD-10-CM

## 2023-10-11 DIAGNOSIS — E51.2 WERNICKE'S ENCEPHALOPATHY: ICD-10-CM

## 2023-10-11 DIAGNOSIS — E78.5 DYSLIPIDEMIA: ICD-10-CM

## 2023-10-11 DIAGNOSIS — K21.9 GASTRO-ESOPHAGEAL REFLUX DISEASE WITHOUT ESOPHAGITIS: ICD-10-CM

## 2023-10-11 PROCEDURE — 99310 SBSQ NF CARE HIGH MDM 45: CPT | Performed by: INTERNAL MEDICINE

## 2023-10-11 NOTE — LETTER
Patient: Ileana White  : 1965    Encounter Date: 10/11/2023    Subjective  Patient ID: Ileana White is a 58 y.o. female who is long term resident being seen and evaluated for multiple medical problems.    Patient is 58-year-old female with history of adrenal insufficiency, history of muscle weakness, difficulty in walking, cognitive impairment, altered mental status due to Wernicke's encephalopathy, urinary tract infection, hypokalemia and hypothyroidism who is being seen for follow-up at long-term care.  Patient lab work reviewed and patient TSH levels have been low.  Patient has central hypothyroidism and follows up with endocrinologist.  Patient is due to be seen by endocrinologist.           Review of Systems  Negative for fever or chills  Negative for sore throat, ear pain, nasal discharge  Negative for cough, shortness of breath or wheezing  Negative for chest pain, palpitations, swelling of legs  Negative for abdominal pain, constipation, diarrhea, blood in the stools  Negative for urinary complaints  Negative for headache, dizziness, weakness or numbness  Negative for joint pain  Positive for cognitive impairment  All other systems reviewed and were negative   Objective  Vital signs reviewed at the facility    Physical Exam  Constitutional: Patient does not appear to be in any acute distress  Cardiovascular: RRR, S1/S2, no murmurs, rubs, or gallops, radial pulses +2, no edema of extremities  Pulmonary: CTAB, no respiratory distress.  Abdomen: +BS, soft, non-tender, nondistended, no guarding or rebound, no masses noted  MSK: No joint swelling, normal movements of all extremities. Range of motion- normal.  Skin- No lesions, contusions, or erythema.  Peripheral puslses palpable bilaterally 2+  Neuro: AAO X1, Cranial nerves 2-12 grossly intact,DTR 2+ in all 4 limbs   Psychiatric: Judgment intact. Appropriate mood and behavior   Assessment/Plan  Diagnoses and all orders for this visit:  Essential (primary)  hypertension  Dyslipidemia  Adrenal insufficiency (CMS/HCC)  Hypoglycemia  Hypothyroidism, acquired  Gastro-esophageal reflux disease without esophagitis  Seizure disorder (CMS/HCC)  Wernicke's encephalopathy    Patient is on hydrocortisone for adrenal insufficiency and will follow-up with the endocrinologist  Patient is on atorvastatin for hyperlipidemia, patient is on mirtazapine,  On Synthroid and TSH levels are being monitored, patient is on Vistaril and seen by psych nurse practitioner at the facility.  Patient is not able to make her decisions.        Electronically Signed By: Onofre Renteria MD   11/8/23 11:15 AM

## 2023-10-20 ENCOUNTER — NURSING HOME VISIT (OUTPATIENT)
Dept: POST ACUTE CARE | Facility: EXTERNAL LOCATION | Age: 58
End: 2023-10-20
Payer: COMMERCIAL

## 2023-10-20 DIAGNOSIS — N39.0 URINARY TRACT INFECTION WITHOUT HEMATURIA, SITE UNSPECIFIED: Primary | ICD-10-CM

## 2023-10-20 PROCEDURE — 99309 SBSQ NF CARE MODERATE MDM 30: CPT | Performed by: INTERNAL MEDICINE

## 2023-10-20 NOTE — LETTER
Patient: Ileana White  : 1965    Encounter Date: 10/20/2023    Subjective  Patient ID: Ileana White is a 58 y.o. female who is long term resident being seen and evaluated for multiple medical problems.    Patient has been having more confusion and patient was tested for UTI and is positive.  Patient is being started on antibiotics.         Review of Systems  Negative for fever or chills  Negative for sore throat, ear pain, nasal discharge  Negative for cough, shortness of breath or wheezing  Negative for chest pain, palpitations, swelling of legs  Negative for abdominal pain, constipation, diarrhea, blood in the stools  Negative for urinary complaints  Negative for headache, dizziness, weakness or numbness  Negative for joint pain  Positive for confusion  All other systems reviewed and were negative   Objective  Vitals are stable    Physical Exam    Assessment/Plan  Diagnoses and all orders for this visit:  Urinary tract infection without hematuria, site unspecified    Started on oral antibiotics.        Electronically Signed By: Onofre Renteria MD   23 11:17 AM

## 2023-10-21 NOTE — PROGRESS NOTES
Subjective   Patient ID: Ileana White is a 58 y.o. female who is long term resident being seen and evaluated for multiple medical problems.    Patient has been having more confusion and patient was tested for UTI and is positive.  Patient is being started on antibiotics.         Review of Systems  Negative for fever or chills  Negative for sore throat, ear pain, nasal discharge  Negative for cough, shortness of breath or wheezing  Negative for chest pain, palpitations, swelling of legs  Negative for abdominal pain, constipation, diarrhea, blood in the stools  Negative for urinary complaints  Negative for headache, dizziness, weakness or numbness  Negative for joint pain  Positive for confusion  All other systems reviewed and were negative   Objective   Vitals are stable    Physical Exam    Assessment/Plan   Diagnoses and all orders for this visit:  Urinary tract infection without hematuria, site unspecified    Started on oral antibiotics.

## 2023-10-24 ENCOUNTER — APPOINTMENT (OUTPATIENT)
Dept: RADIOLOGY | Facility: HOSPITAL | Age: 58
End: 2023-10-24
Payer: COMMERCIAL

## 2023-10-24 ENCOUNTER — HOSPITAL ENCOUNTER (OUTPATIENT)
Facility: HOSPITAL | Age: 58
Setting detail: OBSERVATION
Discharge: NURSING, REHAB OR LONG-TERM CARE | End: 2023-10-26
Attending: STUDENT IN AN ORGANIZED HEALTH CARE EDUCATION/TRAINING PROGRAM | Admitting: INTERNAL MEDICINE
Payer: COMMERCIAL

## 2023-10-24 ENCOUNTER — HOSPITAL ENCOUNTER (EMERGENCY)
Facility: HOSPITAL | Age: 58
Discharge: HOME | End: 2023-10-24
Attending: EMERGENCY MEDICINE
Payer: COMMERCIAL

## 2023-10-24 VITALS
HEIGHT: 63 IN | BODY MASS INDEX: 19.96 KG/M2 | HEART RATE: 85 BPM | WEIGHT: 112.66 LBS | SYSTOLIC BLOOD PRESSURE: 110 MMHG | OXYGEN SATURATION: 98 % | DIASTOLIC BLOOD PRESSURE: 76 MMHG | TEMPERATURE: 98.1 F | RESPIRATION RATE: 15 BRPM

## 2023-10-24 DIAGNOSIS — E16.2 HYPOGLYCEMIA: Primary | ICD-10-CM

## 2023-10-24 DIAGNOSIS — S09.90XA HEAD INJURY, INITIAL ENCOUNTER: ICD-10-CM
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
## 2023-10-24 PROBLEM — E78.5 DYSLIPIDEMIA: Status: ACTIVE | Noted: 2023-10-24

## 2023-10-24 PROBLEM — G40.909 SEIZURE DISORDER (MULTI): Status: ACTIVE | Noted: 2023-10-24

## 2023-10-24 PROBLEM — E03.9 HYPOTHYROIDISM, ACQUIRED: Status: ACTIVE | Noted: 2021-05-20

## 2023-10-24 PROBLEM — I25.10 ATHSCL HEART DISEASE OF NATIVE CORONARY ARTERY W/O ANG PCTRS: Status: ACTIVE | Noted: 2023-06-22

## 2023-10-24 PROBLEM — I10 ESSENTIAL (PRIMARY) HYPERTENSION: Status: ACTIVE | Noted: 2021-05-20

## 2023-10-24 PROBLEM — K21.9 GASTRO-ESOPHAGEAL REFLUX DISEASE WITHOUT ESOPHAGITIS: Status: ACTIVE | Noted: 2023-06-22

## 2023-10-24 PROBLEM — E51.2 WERNICKE'S ENCEPHALOPATHY: Status: ACTIVE | Noted: 2023-06-22

## 2023-10-24 PROBLEM — E27.40 ADRENAL INSUFFICIENCY (MULTI): Status: ACTIVE | Noted: 2023-10-24

## 2023-10-24 LAB
ALBUMIN SERPL-MCNC: 3.1 G/DL (ref 3.5–5)
ALP BLD-CCNC: 128 U/L (ref 35–125)
ALT SERPL-CCNC: 11 U/L (ref 5–40)
ANION GAP SERPL CALC-SCNC: 13 MMOL/L
APPEARANCE UR: ABNORMAL
AST SERPL-CCNC: 38 U/L (ref 5–40)
BASOPHILS # BLD AUTO: 0.04 X10*3/UL (ref 0–0.1)
BASOPHILS NFR BLD AUTO: 0.8 %
BILIRUB SERPL-MCNC: 0.6 MG/DL (ref 0.1–1.2)
BILIRUB UR STRIP.AUTO-MCNC: NEGATIVE MG/DL
BUN SERPL-MCNC: 7 MG/DL (ref 8–25)
CALCIUM SERPL-MCNC: 8.8 MG/DL (ref 8.5–10.4)
CHLORIDE SERPL-SCNC: 97 MMOL/L (ref 97–107)
CO2 SERPL-SCNC: 22 MMOL/L (ref 24–31)
COLOR UR: YELLOW
CREAT SERPL-MCNC: 0.9 MG/DL (ref 0.4–1.6)
EOSINOPHIL # BLD AUTO: 0.13 X10*3/UL (ref 0–0.7)
EOSINOPHIL NFR BLD AUTO: 2.6 %
ERYTHROCYTE [DISTWIDTH] IN BLOOD BY AUTOMATED COUNT: 15.1 % (ref 11.5–14.5)
GFR SERPL CREATININE-BSD FRML MDRD: 75 ML/MIN/1.73M*2
GLUCOSE BLD MANUAL STRIP-MCNC: 100 MG/DL (ref 74–99)
GLUCOSE BLD MANUAL STRIP-MCNC: 127 MG/DL (ref 74–99)
GLUCOSE BLD MANUAL STRIP-MCNC: 40 MG/DL (ref 74–99)
GLUCOSE BLD MANUAL STRIP-MCNC: 63 MG/DL (ref 74–99)
GLUCOSE BLD MANUAL STRIP-MCNC: 72 MG/DL (ref 74–99)
GLUCOSE BLD MANUAL STRIP-MCNC: 85 MG/DL (ref 74–99)
GLUCOSE SERPL-MCNC: 43 MG/DL (ref 65–99)
GLUCOSE UR STRIP.AUTO-MCNC: NORMAL MG/DL
HCT VFR BLD AUTO: 32.7 % (ref 36–46)
HGB BLD-MCNC: 10.5 G/DL (ref 12–16)
HYALINE CASTS #/AREA URNS AUTO: ABNORMAL /LPF
IMM GRANULOCYTES # BLD AUTO: 0.01 X10*3/UL (ref 0–0.7)
IMM GRANULOCYTES NFR BLD AUTO: 0.2 % (ref 0–0.9)
KETONES UR STRIP.AUTO-MCNC: ABNORMAL MG/DL
LEUKOCYTE ESTERASE UR QL STRIP.AUTO: ABNORMAL
LYMPHOCYTES # BLD AUTO: 1.81 X10*3/UL (ref 1.2–4.8)
LYMPHOCYTES NFR BLD AUTO: 36.6 %
MCH RBC QN AUTO: 28.6 PG (ref 26–34)
MCHC RBC AUTO-ENTMCNC: 32.1 G/DL (ref 32–36)
MCV RBC AUTO: 89 FL (ref 80–100)
MONOCYTES # BLD AUTO: 0.41 X10*3/UL (ref 0.1–1)
MONOCYTES NFR BLD AUTO: 8.3 %
NEUTROPHILS # BLD AUTO: 2.54 X10*3/UL (ref 1.2–7.7)
NEUTROPHILS NFR BLD AUTO: 51.5 %
NITRITE UR QL STRIP.AUTO: NEGATIVE
NRBC BLD-RTO: 0 /100 WBCS (ref 0–0)
PH UR STRIP.AUTO: 5.5 [PH]
PLATELET # BLD AUTO: 276 X10*3/UL (ref 150–450)
PMV BLD AUTO: 10.3 FL (ref 7.5–11.5)
POTASSIUM SERPL-SCNC: 4.4 MMOL/L (ref 3.4–5.1)
PROT SERPL-MCNC: 6 G/DL (ref 5.9–7.9)
PROT UR STRIP.AUTO-MCNC: NEGATIVE MG/DL
RBC # BLD AUTO: 3.67 X10*6/UL (ref 4–5.2)
RBC # UR STRIP.AUTO: NEGATIVE /UL
RBC #/AREA URNS AUTO: ABNORMAL /HPF
SODIUM SERPL-SCNC: 132 MMOL/L (ref 133–145)
SP GR UR STRIP.AUTO: 1.01
SQUAMOUS #/AREA URNS AUTO: ABNORMAL /HPF
UROBILINOGEN UR STRIP.AUTO-MCNC: NORMAL MG/DL
WBC # BLD AUTO: 4.9 X10*3/UL (ref 4.4–11.3)
WBC #/AREA URNS AUTO: >50 /HPF
WBC CLUMPS #/AREA URNS AUTO: ABNORMAL /HPF

## 2023-10-24 PROCEDURE — 2500000004 HC RX 250 GENERAL PHARMACY W/ HCPCS (ALT 636 FOR OP/ED): Performed by: STUDENT IN AN ORGANIZED HEALTH CARE EDUCATION/TRAINING PROGRAM

## 2023-10-24 PROCEDURE — 81001 URINALYSIS AUTO W/SCOPE: CPT | Performed by: EMERGENCY MEDICINE

## 2023-10-24 PROCEDURE — 82947 ASSAY GLUCOSE BLOOD QUANT: CPT

## 2023-10-24 PROCEDURE — 82947 ASSAY GLUCOSE BLOOD QUANT: CPT | Mod: 59

## 2023-10-24 PROCEDURE — 80053 COMPREHEN METABOLIC PANEL: CPT | Performed by: PHYSICIAN ASSISTANT

## 2023-10-24 PROCEDURE — 87086 URINE CULTURE/COLONY COUNT: CPT | Mod: TRILAB | Performed by: EMERGENCY MEDICINE

## 2023-10-24 PROCEDURE — 72125 CT NECK SPINE W/O DYE: CPT

## 2023-10-24 PROCEDURE — G0378 HOSPITAL OBSERVATION PER HR: HCPCS

## 2023-10-24 PROCEDURE — 2500000001 HC RX 250 WO HCPCS SELF ADMINISTERED DRUGS (ALT 637 FOR MEDICARE OP): Performed by: NURSE PRACTITIONER

## 2023-10-24 PROCEDURE — 99285 EMERGENCY DEPT VISIT HI MDM: CPT | Mod: 25,27 | Performed by: STUDENT IN AN ORGANIZED HEALTH CARE EDUCATION/TRAINING PROGRAM

## 2023-10-24 PROCEDURE — 2500000004 HC RX 250 GENERAL PHARMACY W/ HCPCS (ALT 636 FOR OP/ED): Performed by: NURSE PRACTITIONER

## 2023-10-24 PROCEDURE — 36415 COLL VENOUS BLD VENIPUNCTURE: CPT | Performed by: PHYSICIAN ASSISTANT

## 2023-10-24 PROCEDURE — 2500000005 HC RX 250 GENERAL PHARMACY W/O HCPCS

## 2023-10-24 PROCEDURE — 99285 EMERGENCY DEPT VISIT HI MDM: CPT | Mod: 25 | Performed by: EMERGENCY MEDICINE

## 2023-10-24 PROCEDURE — 70450 CT HEAD/BRAIN W/O DYE: CPT

## 2023-10-24 PROCEDURE — 85025 COMPLETE CBC W/AUTO DIFF WBC: CPT | Performed by: PHYSICIAN ASSISTANT

## 2023-10-24 RX ORDER — HYDROXYZINE PAMOATE 25 MG/1
25 CAPSULE ORAL 3 TIMES DAILY PRN
COMMUNITY

## 2023-10-24 RX ORDER — DEXTROSE 50 % IN WATER (D50W) INTRAVENOUS SYRINGE
Status: COMPLETED
Start: 2023-10-24 | End: 2023-10-24

## 2023-10-24 RX ORDER — LEVOTHYROXINE SODIUM 100 UG/1
100 TABLET ORAL
COMMUNITY

## 2023-10-24 RX ORDER — SULFAMETHOXAZOLE AND TRIMETHOPRIM 800; 160 MG/1; MG/1
1 TABLET ORAL 2 TIMES DAILY
COMMUNITY
Start: 2023-10-21 | End: 2023-11-04

## 2023-10-24 RX ORDER — HYDROCORTISONE 5 MG/1
5 TABLET ORAL DAILY
COMMUNITY

## 2023-10-24 RX ORDER — ONDANSETRON HYDROCHLORIDE 2 MG/ML
4 INJECTION, SOLUTION INTRAVENOUS EVERY 8 HOURS PRN
Status: DISCONTINUED | OUTPATIENT
Start: 2023-10-24 | End: 2023-10-26 | Stop reason: HOSPADM

## 2023-10-24 RX ORDER — MIRTAZAPINE 15 MG/1
15 TABLET, FILM COATED ORAL NIGHTLY
COMMUNITY

## 2023-10-24 RX ORDER — ATORVASTATIN CALCIUM 40 MG/1
40 TABLET, FILM COATED ORAL NIGHTLY
Status: DISCONTINUED | OUTPATIENT
Start: 2023-10-24 | End: 2023-10-26 | Stop reason: HOSPADM

## 2023-10-24 RX ORDER — PANTOPRAZOLE SODIUM 40 MG/1
40 TABLET, DELAYED RELEASE ORAL
Status: DISCONTINUED | OUTPATIENT
Start: 2023-10-25 | End: 2023-10-26 | Stop reason: HOSPADM

## 2023-10-24 RX ORDER — SODIUM CHLORIDE 450 MG/100ML
100 INJECTION, SOLUTION INTRAVENOUS CONTINUOUS
Status: DISCONTINUED | OUTPATIENT
Start: 2023-10-24 | End: 2023-10-24

## 2023-10-24 RX ORDER — LEVOTHYROXINE SODIUM 100 UG/1
100 TABLET ORAL
Status: DISCONTINUED | OUTPATIENT
Start: 2023-10-25 | End: 2023-10-26 | Stop reason: HOSPADM

## 2023-10-24 RX ORDER — ONDANSETRON 4 MG/1
4 TABLET, FILM COATED ORAL EVERY 8 HOURS
COMMUNITY

## 2023-10-24 RX ORDER — ACETAMINOPHEN 500 MG
5 TABLET ORAL NIGHTLY PRN
Status: DISCONTINUED | OUTPATIENT
Start: 2023-10-24 | End: 2023-10-26 | Stop reason: HOSPADM

## 2023-10-24 RX ORDER — POTASSIUM CHLORIDE 1.5 G/1.58G
20 POWDER, FOR SOLUTION ORAL DAILY
Status: DISCONTINUED | OUTPATIENT
Start: 2023-10-25 | End: 2023-10-26 | Stop reason: HOSPADM

## 2023-10-24 RX ORDER — ACETAMINOPHEN 325 MG/1
650 TABLET ORAL EVERY 4 HOURS PRN
Status: DISCONTINUED | OUTPATIENT
Start: 2023-10-24 | End: 2023-10-26 | Stop reason: HOSPADM

## 2023-10-24 RX ORDER — POLYETHYLENE GLYCOL 3350 17 G/17G
17 POWDER, FOR SOLUTION ORAL DAILY PRN
Status: DISCONTINUED | OUTPATIENT
Start: 2023-10-24 | End: 2023-10-26 | Stop reason: HOSPADM

## 2023-10-24 RX ORDER — MIRTAZAPINE 15 MG/1
15 TABLET, FILM COATED ORAL NIGHTLY
Status: DISCONTINUED | OUTPATIENT
Start: 2023-10-24 | End: 2023-10-26 | Stop reason: HOSPADM

## 2023-10-24 RX ORDER — SODIUM BICARBONATE 650 MG/1
1300 TABLET ORAL 2 TIMES DAILY
COMMUNITY

## 2023-10-24 RX ORDER — DEXTROSE 50 % IN WATER (D50W) INTRAVENOUS SYRINGE
25
Status: DISCONTINUED | OUTPATIENT
Start: 2023-10-24 | End: 2023-10-26 | Stop reason: HOSPADM

## 2023-10-24 RX ORDER — HYDROXYZINE PAMOATE 25 MG/1
25 CAPSULE ORAL 3 TIMES DAILY PRN
Status: DISCONTINUED | OUTPATIENT
Start: 2023-10-24 | End: 2023-10-26 | Stop reason: HOSPADM

## 2023-10-24 RX ORDER — DEXTROSE MONOHYDRATE 100 MG/ML
0.3 INJECTION, SOLUTION INTRAVENOUS ONCE AS NEEDED
Status: DISCONTINUED | OUTPATIENT
Start: 2023-10-24 | End: 2023-10-26 | Stop reason: HOSPADM

## 2023-10-24 RX ORDER — MELATONIN 5 MG
5 CAPSULE ORAL NIGHTLY
COMMUNITY

## 2023-10-24 RX ORDER — PANTOPRAZOLE SODIUM 40 MG/1
40 TABLET, DELAYED RELEASE ORAL
COMMUNITY

## 2023-10-24 RX ORDER — ACETAMINOPHEN 160 MG/5ML
650 SOLUTION ORAL EVERY 4 HOURS PRN
Status: DISCONTINUED | OUTPATIENT
Start: 2023-10-24 | End: 2023-10-26 | Stop reason: HOSPADM

## 2023-10-24 RX ORDER — HYDROCORTISONE 5 MG/1
5 TABLET ORAL DAILY
Status: DISCONTINUED | OUTPATIENT
Start: 2023-10-25 | End: 2023-10-26 | Stop reason: HOSPADM

## 2023-10-24 RX ORDER — ATORVASTATIN CALCIUM 40 MG/1
40 TABLET, FILM COATED ORAL DAILY
COMMUNITY

## 2023-10-24 RX ORDER — NAPROXEN SODIUM 220 MG/1
81 TABLET, FILM COATED ORAL DAILY
Status: DISCONTINUED | OUTPATIENT
Start: 2023-10-25 | End: 2023-10-26 | Stop reason: HOSPADM

## 2023-10-24 RX ORDER — DEXTROSE MONOHYDRATE 25 G/50ML
25 INJECTION, SOLUTION INTRAVENOUS ONCE
Status: DISCONTINUED | OUTPATIENT
Start: 2023-10-24 | End: 2023-10-26 | Stop reason: HOSPADM

## 2023-10-24 RX ORDER — POTASSIUM CHLORIDE 1.5 G/1.58G
20 POWDER, FOR SOLUTION ORAL DAILY
COMMUNITY

## 2023-10-24 RX ORDER — SODIUM BICARBONATE 650 MG/1
1300 TABLET ORAL 2 TIMES DAILY
Status: DISCONTINUED | OUTPATIENT
Start: 2023-10-24 | End: 2023-10-26 | Stop reason: HOSPADM

## 2023-10-24 RX ORDER — ONDANSETRON 4 MG/1
4 TABLET, FILM COATED ORAL EVERY 8 HOURS PRN
Status: DISCONTINUED | OUTPATIENT
Start: 2023-10-24 | End: 2023-10-26 | Stop reason: HOSPADM

## 2023-10-24 RX ORDER — NAPROXEN SODIUM 220 MG/1
81 TABLET, FILM COATED ORAL DAILY
COMMUNITY

## 2023-10-24 RX ORDER — FOLIC ACID 1 MG/1
1 TABLET ORAL DAILY
COMMUNITY

## 2023-10-24 RX ORDER — ACETAMINOPHEN 650 MG/1
650 SUPPOSITORY RECTAL EVERY 4 HOURS PRN
Status: DISCONTINUED | OUTPATIENT
Start: 2023-10-24 | End: 2023-10-26 | Stop reason: HOSPADM

## 2023-10-24 RX ORDER — DEXTROSE MONOHYDRATE AND SODIUM CHLORIDE 5; .45 G/100ML; G/100ML
125 INJECTION, SOLUTION INTRAVENOUS CONTINUOUS
Status: DISCONTINUED | OUTPATIENT
Start: 2023-10-24 | End: 2023-10-25

## 2023-10-24 RX ORDER — FOLIC ACID 1 MG/1
1 TABLET ORAL DAILY
Status: DISCONTINUED | OUTPATIENT
Start: 2023-10-25 | End: 2023-10-26 | Stop reason: HOSPADM

## 2023-10-24 RX ADMIN — DEXTROSE MONOHYDRATE 50 ML: 25 INJECTION, SOLUTION INTRAVENOUS at 14:16

## 2023-10-24 RX ADMIN — DEXTROSE MONOHYDRATE 50 ML: 25 INJECTION, SOLUTION INTRAVENOUS at 16:02

## 2023-10-24 RX ADMIN — DEXTROSE MONOHYDRATE AND SODIUM CHLORIDE 75 ML/HR: 5; .45 INJECTION, SOLUTION INTRAVENOUS at 18:45

## 2023-10-24 RX ADMIN — SODIUM BICARBONATE 650 MG TABLET 1300 MG: at 20:29

## 2023-10-24 RX ADMIN — MIRTAZAPINE 15 MG: 15 TABLET, FILM COATED ORAL at 20:29

## 2023-10-24 RX ADMIN — ATORVASTATIN CALCIUM 40 MG: 40 TABLET, FILM COATED ORAL at 21:00

## 2023-10-24 SDOH — SOCIAL STABILITY: SOCIAL INSECURITY: ARE YOU OR HAVE YOU BEEN THREATENED OR ABUSED PHYSICALLY, EMOTIONALLY, OR SEXUALLY BY ANYONE?: NO

## 2023-10-24 SDOH — SOCIAL STABILITY: SOCIAL INSECURITY: ABUSE: ADULT

## 2023-10-24 SDOH — SOCIAL STABILITY: SOCIAL INSECURITY: ARE THERE ANY APPARENT SIGNS OF INJURIES/BEHAVIORS THAT COULD BE RELATED TO ABUSE/NEGLECT?: NO

## 2023-10-24 SDOH — SOCIAL STABILITY: SOCIAL INSECURITY: WERE YOU ABLE TO COMPLETE ALL THE BEHAVIORAL HEALTH SCREENINGS?: YES

## 2023-10-24 SDOH — SOCIAL STABILITY: SOCIAL INSECURITY: DO YOU FEEL ANYONE HAS EXPLOITED OR TAKEN ADVANTAGE OF YOU FINANCIALLY OR OF YOUR PERSONAL PROPERTY?: NO

## 2023-10-24 SDOH — SOCIAL STABILITY: SOCIAL INSECURITY: DO YOU FEEL UNSAFE GOING BACK TO THE PLACE WHERE YOU ARE LIVING?: NO

## 2023-10-24 SDOH — SOCIAL STABILITY: SOCIAL INSECURITY: HAS ANYONE EVER THREATENED TO HURT YOUR FAMILY OR YOUR PETS?: NO

## 2023-10-24 SDOH — SOCIAL STABILITY: SOCIAL INSECURITY: HAVE YOU HAD THOUGHTS OF HARMING ANYONE ELSE?: NO

## 2023-10-24 SDOH — SOCIAL STABILITY: SOCIAL INSECURITY: DOES ANYONE TRY TO KEEP YOU FROM HAVING/CONTACTING OTHER FRIENDS OR DOING THINGS OUTSIDE YOUR HOME?: NO

## 2023-10-24 ASSESSMENT — ENCOUNTER SYMPTOMS
ENDOCRINE NEGATIVE: 1
SHORTNESS OF BREATH: 1
GASTROINTESTINAL NEGATIVE: 1
CARDIOVASCULAR NEGATIVE: 1
PSYCHIATRIC NEGATIVE: 1
CONSTITUTIONAL NEGATIVE: 1
NEUROLOGICAL NEGATIVE: 1
MUSCULOSKELETAL NEGATIVE: 1
EYES NEGATIVE: 1

## 2023-10-24 ASSESSMENT — ACTIVITIES OF DAILY LIVING (ADL)
FEEDING YOURSELF: NEEDS ASSISTANCE
JUDGMENT_ADEQUATE_SAFELY_COMPLETE_DAILY_ACTIVITIES: UNABLE TO ASSESS
DRESSING YOURSELF: NEEDS ASSISTANCE
ADEQUATE_TO_COMPLETE_ADL: UNABLE TO ASSESS
HEARING - LEFT EAR: FUNCTIONAL
LACK_OF_TRANSPORTATION: PATIENT DECLINED
WALKS IN HOME: NEEDS ASSISTANCE
HEARING - RIGHT EAR: FUNCTIONAL
PATIENT'S MEMORY ADEQUATE TO SAFELY COMPLETE DAILY ACTIVITIES?: NO
GROOMING: NEEDS ASSISTANCE
TOILETING: NEEDS ASSISTANCE
BATHING: NEEDS ASSISTANCE

## 2023-10-24 ASSESSMENT — LIFESTYLE VARIABLES
HOW MANY STANDARD DRINKS CONTAINING ALCOHOL DO YOU HAVE ON A TYPICAL DAY: PATIENT DOES NOT DRINK
HOW OFTEN DO YOU HAVE 6 OR MORE DRINKS ON ONE OCCASION: NEVER
AUDIT-C TOTAL SCORE: 0
HOW OFTEN DO YOU HAVE A DRINK CONTAINING ALCOHOL: NEVER
AUDIT-C TOTAL SCORE: 0
SKIP TO QUESTIONS 9-10: 1

## 2023-10-24 ASSESSMENT — COGNITIVE AND FUNCTIONAL STATUS - GENERAL
WALKING IN HOSPITAL ROOM: A LITTLE
STANDING UP FROM CHAIR USING ARMS: A LITTLE
PERSONAL GROOMING: A LITTLE
TURNING FROM BACK TO SIDE WHILE IN FLAT BAD: A LITTLE
DRESSING REGULAR LOWER BODY CLOTHING: A LITTLE
CLIMB 3 TO 5 STEPS WITH RAILING: A LITTLE
TOILETING: A LITTLE
DAILY ACTIVITIY SCORE: 19
DRESSING REGULAR UPPER BODY CLOTHING: A LITTLE
MOVING TO AND FROM BED TO CHAIR: A LITTLE
MOVING FROM LYING ON BACK TO SITTING ON SIDE OF FLAT BED WITH BEDRAILS: A LITTLE
HELP NEEDED FOR BATHING: A LITTLE
PATIENT BASELINE BEDBOUND: NO
MOBILITY SCORE: 18

## 2023-10-24 ASSESSMENT — PATIENT HEALTH QUESTIONNAIRE - PHQ9
SUM OF ALL RESPONSES TO PHQ9 QUESTIONS 1 & 2: 0
1. LITTLE INTEREST OR PLEASURE IN DOING THINGS: NOT AT ALL
2. FEELING DOWN, DEPRESSED OR HOPELESS: NOT AT ALL

## 2023-10-24 ASSESSMENT — PAIN - FUNCTIONAL ASSESSMENT
PAIN_FUNCTIONAL_ASSESSMENT: 0-10
PAIN_FUNCTIONAL_ASSESSMENT: 0-10

## 2023-10-24 ASSESSMENT — PAIN DESCRIPTION - LOCATION: LOCATION: HEAD

## 2023-10-24 ASSESSMENT — COLUMBIA-SUICIDE SEVERITY RATING SCALE - C-SSRS
6. HAVE YOU EVER DONE ANYTHING, STARTED TO DO ANYTHING, OR PREPARED TO DO ANYTHING TO END YOUR LIFE?: NO
2. HAVE YOU ACTUALLY HAD ANY THOUGHTS OF KILLING YOURSELF?: NO
2. HAVE YOU ACTUALLY HAD ANY THOUGHTS OF KILLING YOURSELF?: NO
1. IN THE PAST MONTH, HAVE YOU WISHED YOU WERE DEAD OR WISHED YOU COULD GO TO SLEEP AND NOT WAKE UP?: NO
1. IN THE PAST MONTH, HAVE YOU WISHED YOU WERE DEAD OR WISHED YOU COULD GO TO SLEEP AND NOT WAKE UP?: NO
6. HAVE YOU EVER DONE ANYTHING, STARTED TO DO ANYTHING, OR PREPARED TO DO ANYTHING TO END YOUR LIFE?: NO

## 2023-10-24 ASSESSMENT — PAIN DESCRIPTION - PAIN TYPE: TYPE: ACUTE PAIN

## 2023-10-24 ASSESSMENT — PAIN SCALES - GENERAL: PAINLEVEL_OUTOF10: 8

## 2023-10-24 NOTE — ED NOTES
Pts BGM was 40. 1 syringe D50 given since pt is confused and refusing to eat or drink. Pamela Bright notified.      Lianna Be RN  10/24/23 7903

## 2023-10-24 NOTE — H&P
History Of Present Illness  Ileana White is a 58 y.o. female presenting with hypoglycemia. Patient resting in bed. Poor historian, most information from medical record. Patient had been here earlier today after unwitnessed fall and laceration above left eye. She was treated and discharged back to [facility]. She was found to be hypoglycemic around lunchtime. EMS was called and patient came back to ER. Glucose has been fluctuating since then and plan is for admission to evaluate. Currently, patient denies chest pain, shortness of breath, abdominal pain, fevers, chills.      Past Medical History  Past Medical History:   Diagnosis Date    Adrenal insufficiency (Issaquena's disease) (CMS/HCC)     Dyslipidemia     GERD without esophagitis     Heart disease     Hypertension     Hypothyroidism     Wernicke's encephalopathy        Surgical History  Past Surgical History:   Procedure Laterality Date    MASS EXCISION      Pituitary        Social History  She reports that she has been smoking cigarettes. She has never used smokeless tobacco. She reports that she does not currently use alcohol. She reports that she does not currently use drugs.    Family History  No family history on file.     Allergies  Egg and Morphine    Review of Systems   Constitutional: Negative.    HENT: Negative.     Eyes: Negative.    Respiratory:  Positive for shortness of breath.    Cardiovascular: Negative.    Gastrointestinal: Negative.    Endocrine: Negative.    Genitourinary: Negative.    Musculoskeletal: Negative.    Skin: Negative.    Neurological: Negative.    Psychiatric/Behavioral: Negative.          Physical Exam  Constitutional:       Appearance: Normal appearance.   HENT:      Head: Normocephalic and atraumatic.      Mouth/Throat:      Mouth: Mucous membranes are moist.      Pharynx: Oropharynx is clear.   Eyes:      Extraocular Movements: Extraocular movements intact.      Pupils: Pupils are equal, round, and reactive to light.       "Comments: Right eye ecchymotic and swollen from fall   Cardiovascular:      Rate and Rhythm: Normal rate and regular rhythm.      Pulses: Normal pulses.      Heart sounds: Normal heart sounds.   Pulmonary:      Effort: Pulmonary effort is normal.      Breath sounds: Normal breath sounds.   Abdominal:      General: Bowel sounds are normal.      Palpations: Abdomen is soft.      Tenderness: There is no abdominal tenderness.   Musculoskeletal:         General: Normal range of motion.      Cervical back: Normal range of motion.   Skin:     General: Skin is warm and dry.   Neurological:      General: No focal deficit present.      Mental Status: She is alert and oriented to person, place, and time.   Psychiatric:         Mood and Affect: Mood normal.          Last Recorded Vitals  Blood pressure 96/69, pulse 107, temperature 37.1 °C (98.8 °F), temperature source Oral, resp. rate 18, height 1.626 m (5' 4\"), weight 51.1 kg (112 lb 10.5 oz), SpO2 97 %.    Relevant Results   Results for orders placed or performed during the hospital encounter of 10/24/23 (from the past 24 hour(s))   CBC and Auto Differential   Result Value Ref Range    WBC 4.9 4.4 - 11.3 x10*3/uL    nRBC 0.0 0.0 - 0.0 /100 WBCs    RBC 3.67 (L) 4.00 - 5.20 x10*6/uL    Hemoglobin 10.5 (L) 12.0 - 16.0 g/dL    Hematocrit 32.7 (L) 36.0 - 46.0 %    MCV 89 80 - 100 fL    MCH 28.6 26.0 - 34.0 pg    MCHC 32.1 32.0 - 36.0 g/dL    RDW 15.1 (H) 11.5 - 14.5 %    Platelets 276 150 - 450 x10*3/uL    MPV 10.3 7.5 - 11.5 fL    Neutrophils % 51.5 40.0 - 80.0 %    Immature Granulocytes %, Automated 0.2 0.0 - 0.9 %    Lymphocytes % 36.6 13.0 - 44.0 %    Monocytes % 8.3 2.0 - 10.0 %    Eosinophils % 2.6 0.0 - 6.0 %    Basophils % 0.8 0.0 - 2.0 %    Neutrophils Absolute 2.54 1.20 - 7.70 x10*3/uL    Immature Granulocytes Absolute, Automated 0.01 0.00 - 0.70 x10*3/uL    Lymphocytes Absolute 1.81 1.20 - 4.80 x10*3/uL    Monocytes Absolute 0.41 0.10 - 1.00 x10*3/uL    Eosinophils " Absolute 0.13 0.00 - 0.70 x10*3/uL    Basophils Absolute 0.04 0.00 - 0.10 x10*3/uL   Comprehensive metabolic panel   Result Value Ref Range    Glucose 43 (L) 65 - 99 mg/dL    Sodium 132 (L) 133 - 145 mmol/L    Potassium 4.4 3.4 - 5.1 mmol/L    Chloride 97 97 - 107 mmol/L    Bicarbonate 22 (L) 24 - 31 mmol/L    Urea Nitrogen 7 (L) 8 - 25 mg/dL    Creatinine 0.90 0.40 - 1.60 mg/dL    eGFR 75 >60 mL/min/1.73m*2    Calcium 8.8 8.5 - 10.4 mg/dL    Albumin 3.1 (L) 3.5 - 5.0 g/dL    Alkaline Phosphatase 128 (H) 35 - 125 U/L    Total Protein 6.0 5.9 - 7.9 g/dL    AST 38 5 - 40 U/L    Bilirubin, Total 0.6 0.1 - 1.2 mg/dL    ALT 11 5 - 40 U/L    Anion Gap 13 <=19 mmol/L   POCT GLUCOSE   Result Value Ref Range    POCT Glucose 100 (H) 74 - 99 mg/dL   POCT GLUCOSE   Result Value Ref Range    POCT Glucose 40 (L) 74 - 99 mg/dL   POCT GLUCOSE   Result Value Ref Range    POCT Glucose 127 (H) 74 - 99 mg/dL   POCT GLUCOSE   Result Value Ref Range    POCT Glucose 63 (L) 74 - 99 mg/dL   POCT GLUCOSE   Result Value Ref Range    POCT Glucose 72 (L) 74 - 99 mg/dL          Assessment/Plan   Principal Problem:    Hypoglycemia   Glucose q 4 hours    D5-1/2 NS @75  ml/hr   Hypoglycemia protocol    Hold Bactrim    Await urine culture    Active Problems:    Adrenal insufficiency (CMS/HCC)   Continue oral hydrocortisone     Athscl heart disease of native coronary artery w/o ang pctrs   Angina free, stable    Continue aspirin, statin     Essential (primary) hypertension   No current medications    Vital signs as ordered     Dyslipidemia   Continue statin    Gastro-esophageal reflux disease without esophagitis   Continue PPI     Hypothyroidism, acquired   Continue levothyroxine     Seizure disorder (CMS/HCC)    Wernicke's encephalopathy   - Continue folic acid     I spent 45 minutes in the professional and overall care of this patient.      Radha Alvarez, APRN-CNP

## 2023-10-24 NOTE — DISCHARGE INSTRUCTIONS
Seek immediate medical attention if you develop: new or worsening headache, nausea, vomiting, confusion, weakness, loss of motion in your arms or legs, loss of control of your urine or stool, difficulty waking from sleep, or any new or worsening symptoms.    Take Robaxin as prescribed for treatment of UTI, there is still evidence of UTI on urinalysis today in the emergency department.  Follow-up with your primary care physician for further outpatient management of your symptoms.  Return to the ED for any new or worsening symptoms including those listed above.

## 2023-10-24 NOTE — ED NOTES
Patient's left brow laceration cleaned using Betasept and sterile water. Patient tolerated well.     Angela L Alesch, RN  10/24/23 0588

## 2023-10-24 NOTE — ED TRIAGE NOTES
TRIAGE NOTE   I saw the patient as the Clinician in Triage and performed a brief history and physical exam, established acuity, and ordered appropriate tests to develop basic plan of care. Patient will be seen by an GABO, resident and/or physician who will independently evaluate the patient. Please see subsequent provider notes for further details and disposition.     Brief HPI: In brief, Ileana White is a 58 y.o. female that presents for a fall.  Per EMS reports, patient had an unwitnessed fall at her nursing home.  Patient is currently being treated for UTI.  Patient does complain of a headache, but has no other complaints at this time.  Of note, per EMS reports from nursing home patient is reportedly normally AAO x2 and is currently at her baseline mental status.  Per nursing home paperwork patient has a history of Wernicke's encephalopathy.    Focused Physical exam:   Bruising noted to the forehead with periorbital swelling around left eye.  PERRLA, EOMI. alert and oriented x2.  No tenderness to palpation extremities, ribs, abdomen, back, neck.    Plan/MDM:   Ordered CT of head and neck, urinalysis      Please see subsequent provider note for further details and disposition

## 2023-10-24 NOTE — ED PROVIDER NOTES
HPI   No chief complaint on file.      HPI     Patient is a 58-year-old female with a history of Warnicke's encephalopathy and living in a nursing facility presented to the emergency department shortly after discharge for evaluation of hypoglycemia.  Patient was evaluated here in the emergency department this morning with CT scans obtained and urinalysis, identifying no acute processes.  Patient was discharged back to her facility in stable condition when her glucose was then found to be 38.  No sugar was administered by nursing staff and 911 was called.  Patient has no complaints, including no chest pain, shortness of breath, abdominal pain, nausea, vomiting, lightheadedness or dizziness.               Amadou Coma Scale Score: 14                  Patient History   Past Medical History:   Diagnosis Date    GERD without esophagitis     Hypothyroidism     Wernicke's encephalopathy      No past surgical history on file.  No family history on file.  Social History     Tobacco Use    Smoking status: Every Day     Types: Cigarettes    Smokeless tobacco: Never   Substance Use Topics    Alcohol use: Not on file    Drug use: Not on file       Physical Exam   ED Triage Vitals [10/24/23 1215]   Temp Heart Rate Resp BP   37.1 °C (98.8 °F) 96 20 103/75      SpO2 Temp Source Heart Rate Source Patient Position   95 % Oral Monitor --      BP Location FiO2 (%)     Left arm --       Physical Exam    GENERAL: Well nourished and well developed. Answers questions appropriately.    SKIN: Clean and dry without evidence of rashes.    HEENT: Skull normocephalic, atraumatic.  Abrasion over the left supraorbital region.  No scleral icterus. PERRLA, with EOMI.  Mucous membranes moist and pink in color. Supple neck, trachea midline. No lymphadenopathy or masses.  No obvious head trauma, palpable hematoma, dickinson sign or raccoon eyes.    RESPIRATORY: Clear to ausculation with normal effort. No adventitious sounds, intercostal retractions or  shallow breathing.    CARDIOVASCULAR: Regular rate and rhythm with normal S1, S2. No S3, S4, murmurs or gallops. Capillary refill brisk, less than 3 seconds bilaterally. No unilateral lower extremity edema.    ABD/: Soft, nontender abdomen. Bowel sounds equal in all four quadrants. No tenderness, rebound, guarding or rigidity.    MSK: Spontaneously moves all 4 extremities without difficulty.  No injury or obvious deformity.  No midline neck or back pain or step-off deformities including cervical, thoracic or the lumbar spine.  Equal sensation of all 4 extremities.  No joint effusions, clubbing, cyanosis, or edema.    NEURO/PSYCH: A&Ox2-3. Cranial nerves II-XII grossly intact. No focal motor or sensory deficits. Appropriate mood and behavior.    ED Course & MDM   Diagnoses as of 10/24/23 1640   Hypoglycemia       Medical Decision Making  Parts of this chart have been completed using voice recognition software. Please excuse any errors of transcription. Despite the medical decision making time stamp above-my medical decision making has taken place during the patient's entire visit. My thought process and reason for plan has been formulated from the time that I saw the patient until the time of disposition and is not specific to one specific moment during their visit and furthermore my MDM encompasses this entire chart and not only this text box.      HPI: Detailed above.    Exam: A medically appropriate exam performed, outlined above, given the known history and presentation.    History obtained from: EMS    Social Determinants of Health considered during this visit: Age, coming from skilled nursing facility    EKG not obtained for today's visit    Labs/Diagnostics: CBC, CMP    Medications given during visit: 50% dextrose solution 25 mL    Differential diagnoses considered for this visit include: Hypo-glycemia, electrolyte imbalance, metabolic disturbance    Considerations/further MDM:    Vital signs were within  normal limits on arrival.  Given the nature of patient's complaint, CBC and CMP were ordered.  Patient was resting comfortably in the bed without signs of lethargy or fatigue.  CBC was within normal limits.  CMP found that patient's glucose levels were at 43, and dextrose injection was administered with repeat blood glucose found to be 100.  Food and drink were given to the patient, however she was not consuming it as she was refusing.  Repeat blood sugar was checked where glucose was found again to be low, requiring administration of 50% dextrose solution to increase glucose level to 127.  Given that the patient was continuously requiring dextrose solution and was not consuming food or water to keep her sugar levels within normal range, admittance to the hospital was warranted.  I consulted with the on-call hospitalist, and the patient was admitted under observation and good condition.    The patient/family was counseled on clinical impression, expectations, and plan along with recommendations to admission. All questions were answered and involved parties were understanding and agreeable to course of treatment. Case was discussed with admitting physician and any consultants. Bed type, ED treatment and further ED workup decided by joint decision making with admitting team and any consultants. Patient stable for admission per my assessment and further management of patient will be deferred to the inpatient setting.    Patient was seen in conjunction with attending physician Dr. Jun Henderson.    Patient's history, physical exam, diagnostic studies, and treatment plan were discussed thoroughly.    Procedure  Procedures     Pamela Bright PA-C  10/24/23 3269

## 2023-10-24 NOTE — ED PROVIDER NOTES
HPI   Chief Complaint   Patient presents with    Fall     Patient brought in from the [facility] by the EMS for evaluation of unwitnessed fall,patient has a laceration on the left side of her forehead,she says her head hurts.No Blood thinners.       This is a 58-year-old female with a past medical history of Warnicke's encephalopathy who lives at a nursing facility presenting to the ED after an unwitnessed fall at the facility.  She does have an abrasion over the forehead, she is not able provide any meaningful history due to her Warnicke's encephalopathy.  She denies any other acute pain at this time, she denies chest pain or shortness of breath, abdominal pain, nausea or any other complaints at this time.  She is at her normal baseline mental status per EMS report.      History provided by:  Patient   used: No                        No data recorded                Patient History   Past Medical History:   Diagnosis Date    GERD without esophagitis     Hypothyroidism     Wernicke's encephalopathy      History reviewed. No pertinent surgical history.  No family history on file.  Social History     Tobacco Use    Smoking status: Every Day     Types: Cigarettes    Smokeless tobacco: Never   Substance Use Topics    Alcohol use: Not on file    Drug use: Not on file       Physical Exam   ED Triage Vitals [10/24/23 0512]   Temp Heart Rate Resp BP   36.4 °C (97.5 °F) 91 16 100/71      SpO2 Temp Source Heart Rate Source Patient Position   98 % Oral Monitor --      BP Location FiO2 (%)     Left arm --       Physical Exam  General: well developed, well nourished 52-year-old female who is awake and alert, in no apparent distress  Eyes: sclera clear bilaterally, PERRL, EOMI  HENT: normocephalic, abrasions over the frontal scalp without palpable skull deformity, no septal hematoma present. Pharynx without erythema or exudates, uvula midline.  No oral or dental injury.  CV: regular rate and rhythm, no  murmur, no gallops, or rubs.   Resp: clear to ascultation bilaterally, no wheezes, rales, or rhonchi  GI: abdomen soft, nontender without rigidity or guarding, no peritoneal signs, abdomen is nondistended, no masses palpated  MSK: No midline spinal tenderness, no tenderness to the hips or pelvis, shoulders, upper or lower extremities.  No tenderness over the chest wall.  Neuro: no focal deficits, CN2-12 intact. Sensation fully intact.  Psych: Odd affect, confabulating, otherwise cooperative with exam  Skin: warm, dry, abrasions over the frontal scalp without gaping laceration.    CT cervical spine wo IV contrast   Final Result   1. No acute fracture or spondylolisthesis.   2. Degenerative disc disease and spondylosis.        .   .        This report is in agreement with the preliminary report issued by   [facility].        MACRO:   None        Signed by: Liam Diaz 10/24/2023 7:53 AM   Dictation workstation:   [id number]      CT head wo IV contrast   Final Result   No acute intracranial findings.   .   .        This report is in agreement with the preliminary report issued by   [facility].        MACRO:   None        Signed by: Liam Diaz 10/24/2023 8:03 AM   Dictation workstation:   [id number]        Labs Reviewed   URINALYSIS WITH REFLEX MICROSCOPIC AND CULTURE - Abnormal       Result Value    Color, Urine Yellow      Appearance, Urine Turbid (*)     Specific Gravity, Urine 1.014      pH, Urine 5.5      Protein, Urine NEGATIVE      Glucose, Urine Normal      Blood, Urine NEGATIVE      Ketones, Urine 20 (1+) (*)     Bilirubin, Urine NEGATIVE      Urobilinogen, Urine Normal      Nitrite, Urine NEGATIVE      Leukocyte Esterase, Urine 500 Mayur/µL (*)    MICROSCOPIC ONLY, URINE - Abnormal    WBC, Urine >50 (*)     WBC Clumps, Urine OCCASIONAL      RBC, Urine 1-2      Squamous Epithelial Cells, Urine 1-9 (SPARSE)      Hyaline Casts, Urine 1+ (*)    URINE CULTURE   URINALYSIS WITH REFLEX  MICROSCOPIC AND CULTURE    Narrative:     The following orders were created for panel order Urinalysis with Reflex Microscopic and Culture.  Procedure                               Abnormality         Status                     ---------                               -----------         ------                     Urinalysis with Reflex M...[[id number]]  Abnormal            Final result               Extra Urine Gray Tube[[id number]]                                                         Please view results for these tests on the individual orders.   EXTRA URINE GRAY TUBE         ED Course & MDM   Diagnoses as of 10/24/23 0810   Fall, initial encounter   Head injury, initial encounter       Medical Decision Making  PMH: Warnicke's encephalopathy patient seen and assessed initially by physician in triage.  History obtained: directly from patient   Social factors affecting disposition: none    CT head cervical spine ordered to assess for injury sustained from her fall.  No other injuries identified on exam.  Urinalysis also ordered due to reports that the patient does have active UTI and is currently taking antibiotics for treatment.  She has no other complaints at this time, no chest pain or shortness of breath, diaphoresis concerning for ACS, no other signs of injury necessitating further imaging work-up today.    CT of the head and cervical spine showed no evidence of intracranial hemorrhage, no evidence of skull fracture or C-spine fracture/dislocation.  Patient is otherwise at her baseline mental status and functional status.  She was discharged back to facility in stable condition.    Procedure  Procedures     Jun Henderson,   10/24/23 0810

## 2023-10-25 LAB
ANION GAP SERPL CALC-SCNC: 8 MMOL/L
BACTERIA UR CULT: NORMAL
BUN SERPL-MCNC: 6 MG/DL (ref 8–25)
CALCIUM SERPL-MCNC: 8.3 MG/DL (ref 8.5–10.4)
CHLORIDE SERPL-SCNC: 100 MMOL/L (ref 97–107)
CO2 SERPL-SCNC: 21 MMOL/L (ref 24–31)
CREAT SERPL-MCNC: 0.8 MG/DL (ref 0.4–1.6)
ERYTHROCYTE [DISTWIDTH] IN BLOOD BY AUTOMATED COUNT: 15.1 % (ref 11.5–14.5)
GFR SERPL CREATININE-BSD FRML MDRD: 86 ML/MIN/1.73M*2
GLUCOSE BLD MANUAL STRIP-MCNC: 105 MG/DL (ref 74–99)
GLUCOSE BLD MANUAL STRIP-MCNC: 115 MG/DL (ref 74–99)
GLUCOSE BLD MANUAL STRIP-MCNC: 121 MG/DL (ref 74–99)
GLUCOSE BLD MANUAL STRIP-MCNC: 129 MG/DL (ref 74–99)
GLUCOSE BLD MANUAL STRIP-MCNC: 75 MG/DL (ref 74–99)
GLUCOSE BLD MANUAL STRIP-MCNC: 75 MG/DL (ref 74–99)
GLUCOSE BLD MANUAL STRIP-MCNC: 87 MG/DL (ref 74–99)
GLUCOSE BLD MANUAL STRIP-MCNC: 96 MG/DL (ref 74–99)
GLUCOSE BLD MANUAL STRIP-MCNC: 97 MG/DL (ref 74–99)
GLUCOSE SERPL-MCNC: 94 MG/DL (ref 65–99)
HCT VFR BLD AUTO: 34 % (ref 36–46)
HGB BLD-MCNC: 10.9 G/DL (ref 12–16)
MCH RBC QN AUTO: 28.3 PG (ref 26–34)
MCHC RBC AUTO-ENTMCNC: 32.1 G/DL (ref 32–36)
MCV RBC AUTO: 88 FL (ref 80–100)
NRBC BLD-RTO: 0 /100 WBCS (ref 0–0)
PLATELET # BLD AUTO: 278 X10*3/UL (ref 150–450)
PMV BLD AUTO: 10.4 FL (ref 7.5–11.5)
POTASSIUM SERPL-SCNC: 4 MMOL/L (ref 3.4–5.1)
RBC # BLD AUTO: 3.85 X10*6/UL (ref 4–5.2)
SODIUM SERPL-SCNC: 129 MMOL/L (ref 133–145)
TSH SERPL DL<=0.05 MIU/L-ACNC: <0.01 MIU/L (ref 0.27–4.2)
WBC # BLD AUTO: 5.4 X10*3/UL (ref 4.4–11.3)

## 2023-10-25 PROCEDURE — 36415 COLL VENOUS BLD VENIPUNCTURE: CPT | Mod: TRILAB | Performed by: INTERNAL MEDICINE

## 2023-10-25 PROCEDURE — 85027 COMPLETE CBC AUTOMATED: CPT | Performed by: NURSE PRACTITIONER

## 2023-10-25 PROCEDURE — 2500000002 HC RX 250 W HCPCS SELF ADMINISTERED DRUGS (ALT 637 FOR MEDICARE OP, ALT 636 FOR OP/ED): Performed by: NURSE PRACTITIONER

## 2023-10-25 PROCEDURE — 2500000004 HC RX 250 GENERAL PHARMACY W/ HCPCS (ALT 636 FOR OP/ED): Performed by: NURSE PRACTITIONER

## 2023-10-25 PROCEDURE — 84681 ASSAY OF C-PEPTIDE: CPT | Mod: TRILAB | Performed by: INTERNAL MEDICINE

## 2023-10-25 PROCEDURE — 2500000001 HC RX 250 WO HCPCS SELF ADMINISTERED DRUGS (ALT 637 FOR MEDICARE OP): Performed by: NURSE PRACTITIONER

## 2023-10-25 PROCEDURE — 36415 COLL VENOUS BLD VENIPUNCTURE: CPT | Performed by: NURSE PRACTITIONER

## 2023-10-25 PROCEDURE — 80048 BASIC METABOLIC PNL TOTAL CA: CPT | Performed by: NURSE PRACTITIONER

## 2023-10-25 PROCEDURE — 82947 ASSAY GLUCOSE BLOOD QUANT: CPT

## 2023-10-25 PROCEDURE — 83527 ASSAY OF INSULIN: CPT | Performed by: INTERNAL MEDICINE

## 2023-10-25 PROCEDURE — 84443 ASSAY THYROID STIM HORMONE: CPT | Performed by: INTERNAL MEDICINE

## 2023-10-25 PROCEDURE — 2500000004 HC RX 250 GENERAL PHARMACY W/ HCPCS (ALT 636 FOR OP/ED): Performed by: INTERNAL MEDICINE

## 2023-10-25 PROCEDURE — 36415 COLL VENOUS BLD VENIPUNCTURE: CPT | Performed by: INTERNAL MEDICINE

## 2023-10-25 PROCEDURE — 82947 ASSAY GLUCOSE BLOOD QUANT: CPT | Mod: 59

## 2023-10-25 PROCEDURE — 92610 EVALUATE SWALLOWING FUNCTION: CPT | Mod: GN | Performed by: SPEECH-LANGUAGE PATHOLOGIST

## 2023-10-25 PROCEDURE — G0378 HOSPITAL OBSERVATION PER HR: HCPCS

## 2023-10-25 PROCEDURE — 82947 ASSAY GLUCOSE BLOOD QUANT: CPT | Mod: 91

## 2023-10-25 RX ORDER — DEXTROSE MONOHYDRATE AND SODIUM CHLORIDE 5; .9 G/100ML; G/100ML
125 INJECTION, SOLUTION INTRAVENOUS CONTINUOUS
Status: DISCONTINUED | OUTPATIENT
Start: 2023-10-25 | End: 2023-10-26 | Stop reason: HOSPADM

## 2023-10-25 RX ADMIN — ASPIRIN 81 MG CHEWABLE TABLET 81 MG: 81 TABLET CHEWABLE at 08:02

## 2023-10-25 RX ADMIN — SODIUM BICARBONATE 650 MG TABLET 1300 MG: at 20:39

## 2023-10-25 RX ADMIN — ATORVASTATIN CALCIUM 40 MG: 40 TABLET, FILM COATED ORAL at 20:39

## 2023-10-25 RX ADMIN — DEXTROSE MONOHYDRATE AND SODIUM CHLORIDE 125 ML/HR: 5; .9 INJECTION, SOLUTION INTRAVENOUS at 18:05

## 2023-10-25 RX ADMIN — PANTOPRAZOLE SODIUM 40 MG: 40 TABLET, DELAYED RELEASE ORAL at 08:03

## 2023-10-25 RX ADMIN — POTASSIUM CHLORIDE 20 MEQ: 1.5 FOR SOLUTION ORAL at 08:02

## 2023-10-25 RX ADMIN — FOLIC ACID 1 MG: 1 TABLET ORAL at 08:02

## 2023-10-25 RX ADMIN — MIRTAZAPINE 15 MG: 15 TABLET, FILM COATED ORAL at 20:40

## 2023-10-25 RX ADMIN — DEXTROSE MONOHYDRATE AND SODIUM CHLORIDE 125 ML/HR: 5; .45 INJECTION, SOLUTION INTRAVENOUS at 15:51

## 2023-10-25 RX ADMIN — LEVOTHYROXINE SODIUM 100 MCG: 0.1 TABLET ORAL at 08:02

## 2023-10-25 RX ADMIN — SODIUM BICARBONATE 650 MG TABLET 1300 MG: at 08:02

## 2023-10-25 RX ADMIN — HYDROCORTISONE 5 MG: 5 TABLET ORAL at 08:03

## 2023-10-25 ASSESSMENT — COGNITIVE AND FUNCTIONAL STATUS - GENERAL
CLIMB 3 TO 5 STEPS WITH RAILING: A LITTLE
MOBILITY SCORE: 18
STANDING UP FROM CHAIR USING ARMS: A LITTLE
DRESSING REGULAR LOWER BODY CLOTHING: A LITTLE
HELP NEEDED FOR BATHING: A LITTLE
MOVING FROM LYING ON BACK TO SITTING ON SIDE OF FLAT BED WITH BEDRAILS: A LITTLE
PERSONAL GROOMING: A LITTLE
DAILY ACTIVITIY SCORE: 12
PERSONAL GROOMING: A LOT
TURNING FROM BACK TO SIDE WHILE IN FLAT BAD: A LITTLE
MOBILITY SCORE: 12
TOILETING: A LOT
MOVING TO AND FROM BED TO CHAIR: A LITTLE
EATING MEALS: A LOT
DRESSING REGULAR LOWER BODY CLOTHING: A LOT
TURNING FROM BACK TO SIDE WHILE IN FLAT BAD: A LITTLE
STANDING UP FROM CHAIR USING ARMS: A LOT
WALKING IN HOSPITAL ROOM: TOTAL
DRESSING REGULAR UPPER BODY CLOTHING: A LITTLE
WALKING IN HOSPITAL ROOM: A LITTLE
TOILETING: A LITTLE
DAILY ACTIVITIY SCORE: 19
MOVING FROM LYING ON BACK TO SITTING ON SIDE OF FLAT BED WITH BEDRAILS: A LITTLE
DRESSING REGULAR UPPER BODY CLOTHING: A LOT
CLIMB 3 TO 5 STEPS WITH RAILING: TOTAL
HELP NEEDED FOR BATHING: A LOT
MOVING TO AND FROM BED TO CHAIR: A LOT

## 2023-10-25 ASSESSMENT — PAIN - FUNCTIONAL ASSESSMENT: PAIN_FUNCTIONAL_ASSESSMENT: 0-10

## 2023-10-25 ASSESSMENT — PAIN SCALES - GENERAL
PAINLEVEL_OUTOF10: 0 - NO PAIN
PAINLEVEL_OUTOF10: 2
PAINLEVEL_OUTOF10: 0 - NO PAIN

## 2023-10-25 NOTE — PROGRESS NOTES
Ileana White is a 58 y.o. female on day 0 of admission presenting with Hypoglycemia.      Subjective   She was awake and alert. She has a diagnosis of Wernicke's encephalopathy. She was responsive and conversational, but oriented to self only.   She reported a poor appetite and said she did not like liquid supplements such as Ensure.        Objective   Last Recorded Vitals  /72 (BP Location: Right arm, Patient Position: Sitting)   Pulse 70   Temp 37 °C (98.6 °F) (Oral)   Resp 18   Wt 51.1 kg (112 lb 10.5 oz)   SpO2 97%   Intake/Output last 3 Shifts:    Intake/Output Summary (Last 24 hours) at 10/25/2023 1705  Last data filed at 10/25/2023 1526  Gross per 24 hour   Intake 1240 ml   Output 500 ml   Net 740 ml       Admission Weight  Weight: 51.1 kg (112 lb 10.5 oz) (10/24/23 1215)    Daily Weight  10/24/23 : 51.1 kg (112 lb 10.5 oz)    Image Results  CT head wo IV contrast  Narrative: Interpreted By:  Liam Diaz,   STUDY:  CT HEAD WO IV CONTRAST; 10/24/2023 5:48 am      INDICATION:  Signs/Symptoms:Fall;      COMPARISON:  06/06/2023      ACCESSION NUMBER(S):  [id number]      ORDERING CLINICIAN:  VICK PAULSON      TECHNIQUE:  Contiguous axial images were acquired from the vertex through the  posterior fossa without IV contrast.      FINDINGS:  No focal mass effect or midline shift is identified. The ventricles  and sulci are symmetric and appropriate for the patient's age.  However, chronic involutional change of the cerebral hemispheres is  noted.      The gray white matter differentiation is preserved. Nonspecific  hypodensities are identified within the periventricular and  subcortical white matter likely due to chronic postischemic white  matter disease. Atherosclerotic calcifications are seen within the  carotid siphons and vertebral arteries.      No acute intracranial hemorrhage is identified. No intra-axial or  extra-axial fluid collection is seen.      The visualized paranasal sinuses and  mastoid air cells are clear.      Right-sided parietal craniotomy changes are noted. No skull fracture  is identified.      Left periorbital soft tissue swelling is seen.      Impression: No acute intracranial findings.  .  .      This report is in agreement with the preliminary report issued by  [facility].      MACRO:  None      Signed by: Liam Diaz 10/24/2023 8:03 AM  Dictation workstation:   [id number]  CT cervical spine wo IV contrast  Narrative: Interpreted By:  Liam Diaz,   STUDY:  CT CERVICAL SPINE WO IV CONTRAST; 10/24/2023 5:48 am      INDICATION:  Signs/Symptoms:Fall;      COMPARISON:  None      ACCESSION NUMBER(S):  [id number]      ORDERING CLINICIAN:  VICK PAULSON      TECHNIQUE:  Contiguous axial images were acquired from the skull base to the lung  apices.      FINDINGS:  There is straightening of the normal cervical lordosis. No acute  compression deformity is noted. Grade 1 anterolisthesis of C4 on C5  is noted. Grade 1 retrolisthesis of C5 on C6 is identified.          The occipital condyles, arch of C1, and the odontoid processes are  intact. The atlantoaxial relationship is well maintained.      Multilevel disc space narrowing is seen. Endplate osteophytosis facet  arthropathy is noted.      Multilevel neural foraminal narrowing is identified. Spinal canal is  grossly patent.      Multiple dental caries can be seen.      The visualized lung apices are unremarkable.      Impression: 1. No acute fracture or spondylolisthesis.  2. Degenerative disc disease and spondylosis.      .  .      This report is in agreement with the preliminary report issued by  [facility].      MACRO:  None      Signed by: Liam Diaz 10/24/2023 7:53 AM  Dictation workstation:   [id number]      Physical Exam   General.: Awake alert well-developed, responsive   HEENT: There was left periorbital bruising   Cardiovascular: Regular heart rate and rhythm normal S1 and S2.   Respiratory:  Equal breath sounds bilaterally clear to auscultation.   Extremities: No peripheral cyanosis, no pedal edema.   Neurologic: Alert and oriented to self, only   Psychological: Appropriate affect and behavior.      Relevant Results  Lab Results   Component Value Date    WBC 5.4 10/25/2023    HGB 10.9 (L) 10/25/2023    HCT 34.0 (L) 10/25/2023    MCV 88 10/25/2023     10/25/2023     Lab Results   Component Value Date    GLUCOSE 94 10/25/2023    CALCIUM 8.3 (L) 10/25/2023     (L) 10/25/2023    K 4.0 10/25/2023    CO2 21 (L) 10/25/2023     10/25/2023    BUN 6 (L) 10/25/2023    CREATININE 0.80 10/25/2023       Assessment/Plan    Principal Problem:    Hypoglycemia  Active Problems:    Adrenal insufficiency (CMS/HCC)    Athscl heart disease of native coronary artery w/o ang pctrs    Essential (primary) hypertension    Dyslipidemia    Gastro-esophageal reflux disease without esophagitis    Hypothyroidism, acquired    Seizure disorder (CMS/HCC)    Wernicke's encephalopathy    1) Hypoglycemia  Monitor glucose levels. Continue to encourage oral intake.     2) Adrenal insufficiency  Continue oral hydrocortisone    3) Coronary artery disease  Stable    4) Hypothyroidism  On levothyroxine    5) Hyperlipidemia  On atorvastatin    6) GERD  On protonix    7) Hyponatremia  IV fluid order changed    8) History of Wernicke's encephalopathy    Plan  Insulin and C-peptide levels  Change IV fluid drom D5 1/2NS to D5 NS because of hyponatremia  Check TSH    Will check insulin and C peptide levels.        Raul Jeter MD

## 2023-10-25 NOTE — CARE PLAN
T  Problem: Fall/Injury  Goal: Not fall by end of shift  Outcome: Progressing  Goal: Be free from injury by end of the shift  Outcome: Progressing  Goal: Verbalize understanding of personal risk factors for fall in the hospital  Outcome: Progressing  Goal: Verbalize understanding of risk factor reduction measures to prevent injury from fall in the home  Outcome: Progressing  Goal: Use assistive devices by end of the shift  Outcome: Progressing  Goal: Pace activities to prevent fatigue by end of the shift  Outcome: Progressing

## 2023-10-25 NOTE — CONSULTS
Nutrition Assessment Note    Reason for Hospital Admission:  Ileana White is a 58 y.o. female who is admitted for hypoglycemia and an unwitnessed fall. Diet advanced per SLP recs from this morning. Pt provided minimal info. C/o being in pain. Notes a decreased appetite prior to admission. Agreeable to supplements at this time.     Nutrition Assessment:  Reason for Assessment  Reason for Assessment: Dietitian discretion (MST 2)     has a past medical history of Adrenal insufficiency (Segun's disease) (CMS/[facility]), Dyslipidemia, GERD without esophagitis, Heart disease, Hypertension, Hypothyroidism, and Wernicke's encephalopathy.     Allergies   Allergen Reactions    Egg Unknown    Morphine Unknown        Lab Results   Component Value Date    WBC 5.4 10/25/2023    HGB 10.9 (L) 10/25/2023    HCT 34.0 (L) 10/25/2023     10/25/2023    CHOL 154 08/05/2021    TRIG 117 08/05/2021    HDL 26 (L) 08/05/2021    ALT 11 10/24/2023    AST 38 10/24/2023     (L) 10/25/2023    K 4.0 10/25/2023     10/25/2023    CREATININE 0.80 10/25/2023    BUN 6 (L) 10/25/2023    CO2 21 (L) 10/25/2023    TSH 0.01 (L) 06/04/2023    INR 1.1 06/02/2023    HGBA1C 5.1 06/03/2023       Current Facility-Administered Medications:     acetaminophen (Tylenol) tablet 650 mg, 650 mg, oral, q4h PRN **OR** acetaminophen (Tylenol) oral liquid 650 mg, 650 mg, nasogastric tube, q4h PRN **OR** acetaminophen (Tylenol) suppository 650 mg, 650 mg, rectal, q4h PRN, NEETA Lebron    aspirin chewable tablet 81 mg, 81 mg, oral, Daily, NEETA Lebron, 81 mg at 10/25/23 0802    atorvastatin (Lipitor) tablet 40 mg, 40 mg, oral, Nightly, NEETA Lebron, 40 mg at 10/24/23 2100    dextrose 10 % in water (D10W) infusion, 0.3 g/kg/hr, intravenous, Once PRN, Radha Alvarez, APRN-CNP    dextrose 5%-0.45 % sodium chloride infusion, 125 mL/hr, intravenous, Continuous, Zacarias Phillips MD, Last Rate: 125 mL/hr at 10/25/23  1012, 125 mL/hr at 10/25/23 1012    dextrose 50 % injection 25 g, 25 g, intravenous, q15 min PRN, NEETA Lebron    dextrose 50 % solution 25 mL, 25 mL, intravenous, Once, Jun Henderson DO    folic acid (Folvite) tablet 1 mg, 1 mg, oral, Daily, NEETA Lebron, 1 mg at 10/25/23 0802    glucagon (Glucagen) injection 1 mg, 1 mg, intramuscular, q15 min PRN, NEETA Lebron    hydrocortisone (Cortef) tablet 5 mg, 5 mg, oral, Daily, NEETA Lebron, 5 mg at 10/25/23 0803    hydrOXYzine pamoate (Vistaril) capsule 25 mg, 25 mg, oral, TID PRN, NEETA Lebron    levothyroxine (Synthroid, Levoxyl) tablet 100 mcg, 100 mcg, oral, Daily before breakfast, NEETA Lebron, 100 mcg at 10/25/23 0802    melatonin tablet 5 mg, 5 mg, oral, Nightly PRN, NEETA Lebron    mirtazapine (Remeron) tablet 15 mg, 15 mg, oral, Nightly, NEETA Lebron, 15 mg at 10/24/23 2029    ondansetron (Zofran) tablet 4 mg, 4 mg, oral, q8h PRN **OR** ondansetron (Zofran) injection 4 mg, 4 mg, intravenous, q8h PRN, NEETA Lebron    pantoprazole (ProtoNix) EC tablet 40 mg, 40 mg, oral, Daily before breakfast, NEETA Lebron, 40 mg at 10/25/23 0803    polyethylene glycol (Glycolax, Miralax) packet 17 g, 17 g, oral, Daily PRN, NEETA Lebron    potassium chloride (Klor-Con) packet 20 mEq, 20 mEq, oral, Daily, NEETA Lebron, 20 mEq at 10/25/23 0802    sodium bicarbonate tablet 1,300 mg, 1,300 mg, oral, BID, JAY Lebron-CNP, 1,300 mg at 10/25/23 0802    Dietary Orders (From admission, onward)       Start     Ordered    10/25/23 0913  Adult diet Regular; Thin 0  Diet effective now        Comments: Upright seating during meals, feeding assistance   Question Answer Comment   Diet type Regular    Fluid consistency Thin 0        10/25/23 0914                  History:  Food and Nutrient  "History  Energy Intake:  (N/A)    Anthropometrics:  Ht: 162.6 cm (5' 4\"), Wt: 51.1 kg (112 lb 10.5 oz), BMI: 19.33    Weight Change  Weight History / % Weight Change: pt denies wt changes.    IBW/kg (Dietitian Calculated): 54.55 kg    Estimated Energy Needs  Total Energy Estimated Needs (kCal): 1527 kCal  Total Estimated Energy Need per Day (kCal/kg): 30 kCal/kg  Method for Estimating Needs: actual wt    Estimated Protein Needs  Total Protein Estimated Needs (g): 61 g  Total Protein Estimated Needs (g/kg): 1 g/kg  Method for Estimating Needs: actual wt    Estimated Fluid Needs  Total Fluid Estimated Needs (mL): 1527 mL  Total Fluid Estimated Needs (mL/kg): 1 mL/kg    Nutrition Focused Physical Findings: deferred - pt in pain  Physical Findings (Nutrition Deficiency/Toxicity)  Eyes:  (bruising to L eye, scab to L eyebrow)    Nutrition Diagnosis:  Patient has Nutrition Diagnosis: Yes  Nutrition Diagnosis 1: Inadequate oral intake  Diagnosis Status (1): New  Related to (1): decreased ability to consume sufficient energy  As Evidenced by (1): poor po intake    Nutrition Interventions/Recommendations:  Food and/or Nutrient Delivery Interventions  Interventions: Meals and snacks, Medical food supplement    Meals and Snacks: General healthful diet  Goal: provide diet per SLP recs    Medical Food Supplement: Commercial beverage  Goal: ensure compact TID chocolate    Education Documentation  No documentation found.        Nutrition Monitoring/Evaluation:  Food and Nutrient Related History  Energy Intake: Estimated energy intake  Criteria: pt to tolerate >/= 75% meals    Fluid Intake: Estimated fluid intake  Criteria: pt to tolerate supplements as ordered    Anthropometrics: Body Composition/Growth/Weight History  Weight: Measured weight  Criteria: pt will maintain/gain wt    RD Recommendations: None at this time.      Follow Up  Time Spent (min): 30 minutes  Last Date of Nutrition Visit: 10/25/23  Nutrition Follow-Up " Needed?: 5-7 days  Follow up Comment: 10/30/23

## 2023-10-25 NOTE — PROGRESS NOTES
"Ileana White is a 58 y.o. female on day 0 of admission presenting with Hypoglycemia.    Subjective   During nursing rounds, RN [facility] T[facility]W [facility]W submitted a return referral to [facility]. [facility] will [facility] to update [facility] as [facility] prior to [facility].        Objective     Physical Exam    Last Recorded Vitals  Blood pressure 98/54, pulse (!) 112, temperature 36.8 °C (98.2 °F), temperature source Oral, resp. rate 20, height 1.626 m (5' 4\"), weight 51.1 kg (112 lb 10.5 oz), SpO2 98 %.  Intake/Output last 3 Shifts:  No intake/output data recorded.    Relevant Results                             Assessment/Plan   Principal Problem:    Hypoglycemia  Active Problems:    Adrenal insufficiency (CMS/HCC)    Athscl heart disease of native coronary artery w/o ang pctrs    Essential (primary) hypertension    Dyslipidemia    Gastro-esophageal reflux disease without esophagitis    Hypothyroidism, acquired    Seizure disorder (CMS/HCC)    Wernicke's encephalopathy               Apollo Walters, LCSW      "

## 2023-10-25 NOTE — PROGRESS NOTES
Speech-Language Pathology    Inpatient Speech-Language Pathology Clinical Swallow Evaluation       Patient Name: Ileana hWite  MRN: [id number]  : 1965  Today's Date: 10/25/23  Time Calculation  Start Time: 823  Stop Time: 905  Time Calculation (min): 42 min       Recommendations:  Risk for Aspiration: Other (Comment) (At risk due to fluctuating mentation 2/2 hypoglycemia)   Solid Diet Recommendations : Regular (IDDSI Level 7)   Liquid Diet Recommendations: Thin (IDDSI Level 0)   Compensatory Swallowing Strategies: Full supervision with meals, Upright 90 degrees as possible for all oral intake, Remain upright for 20-30 minutes after meals     Medication Administration Recommendations: Whole, With Pureed, With Liquid      Assessment:  Pt participated in diagnostic trials of 10 oz thin liquid via straw, 3 tsp apple sauce, and 1/2 taryn cracker.     OME and CNE are grossly WFL. Vocal quality and cough are perceptually WFL. Pt denies any prior dysphagia, any recent rapid weight loss, and any recent pneumonias. Oral care completed prior to PO trials.     Oral phase - Oral mucosa moist and normal in coloration. Mastication, bolus manipulation, and oral clearance timely and functional.     Pharyngeal phase - Although it is a limited assessment technique; Hyolaryngeal elevation/excursion assessed via digital palpation and appeared consistent across all trials. No coughing, choking, nor change in vocal quality present throughout trials. No overt s/s of aspiration present at the bedside.     RECOMMEND regular solids with thin liquids with supervision/assistance as needed given pt's mentation at that time (fluctuating mentation d/t fluctuating glucose levels).     Prognosis: Excellent   Medical Staff Made Aware: Yes (HOMERO MUKHERJEE)     Baseline Assessment:    Pt sitting upright in bed. RN states pt was unable to swallow PO meds crushed in pills this AM. When attempting to give sip of water to help swallow crushed pills  pt was holding in her mouth, vocal quality became very wet and gurgled. Required suction with [place]auer.          Plan:  SLP Plan: Skilled SLP Skilled speech therapy for dysphagia treatment is warranted in order to provide training and instruction regarding the use of compensatory swallow strategies, oropharyngeal strengthening exercises, and pt/caregiver education in order to reduce risk of aspiration, dehydration and malnutrition.  SLP Frequency: 2x per week   Duration: Current admission   Next Treatment Priority: diet dipesh, strategies, d/c if tolerating diet well   Discussed POC: Patient   Discussed Risks/Benefits: Yes   Patient/Caregiver Agreeable: Yes   SLP Discharge Recommendations: unable to determine at this time, refer to subsequent notes    Goals:  1) Pt to tolerate recommended diet without overt s/s of aspiration  2) Pt to demonstrate functional application and knowledge of compensatory swallowing strategies as trained    Subjective   Pt is pleasant. Somewhat sluggish, but participative. Glucose levels are low per RN. SLP assisted pt to sip on apple juice, which does slowly improve pt's engagement.       General Visit Information:  Pt. Admitted  w hypoglycemia  Past medical history:     Adrenal insufficiency (Segun's disease) (CMS/HCC)       Dyslipidemia      GERD without esophagitis      Heart disease      Hypertension      Hypothyroidism      Wernicke's encephalopathy          Current Diet : regular and thin       Pain:  Pain Assessment  Pain Assessment: 0-10  Pain Score: 2  Pain Type: Acute pain  Pain Location: Throat       Treatment:    Inpatient Education:  Pt. educated on safe swallow strategies, role of SLP, recommended diet  Patient gave verbal understanding

## 2023-10-26 ENCOUNTER — PHARMACY VISIT (OUTPATIENT)
Dept: PHARMACY | Facility: CLINIC | Age: 58
End: 2023-10-26
Payer: MEDICAID

## 2023-10-26 VITALS
WEIGHT: 112.66 LBS | HEIGHT: 64 IN | TEMPERATURE: 98.2 F | DIASTOLIC BLOOD PRESSURE: 67 MMHG | RESPIRATION RATE: 18 BRPM | OXYGEN SATURATION: 99 % | HEART RATE: 100 BPM | BODY MASS INDEX: 19.23 KG/M2 | SYSTOLIC BLOOD PRESSURE: 106 MMHG

## 2023-10-26 LAB
ANION GAP SERPL CALC-SCNC: 8 MMOL/L
BUN SERPL-MCNC: 3 MG/DL (ref 8–25)
C PEPTIDE SERPL-MCNC: 0.8 NG/ML (ref 0.7–3.9)
CALCIUM SERPL-MCNC: 7.3 MG/DL (ref 8.5–10.4)
CHLORIDE SERPL-SCNC: 103 MMOL/L (ref 97–107)
CO2 SERPL-SCNC: 20 MMOL/L (ref 24–31)
CREAT SERPL-MCNC: 0.7 MG/DL (ref 0.4–1.6)
ERYTHROCYTE [DISTWIDTH] IN BLOOD BY AUTOMATED COUNT: 14.8 % (ref 11.5–14.5)
GFR SERPL CREATININE-BSD FRML MDRD: >90 ML/MIN/1.73M*2
GLUCOSE BLD MANUAL STRIP-MCNC: 113 MG/DL (ref 74–99)
GLUCOSE BLD MANUAL STRIP-MCNC: 128 MG/DL (ref 74–99)
GLUCOSE BLD MANUAL STRIP-MCNC: 131 MG/DL (ref 74–99)
GLUCOSE BLD MANUAL STRIP-MCNC: 85 MG/DL (ref 74–99)
GLUCOSE SERPL-MCNC: 122 MG/DL (ref 65–99)
HCT VFR BLD AUTO: 26.9 % (ref 36–46)
HGB BLD-MCNC: 8.8 G/DL (ref 12–16)
MCH RBC QN AUTO: 28.6 PG (ref 26–34)
MCHC RBC AUTO-ENTMCNC: 32.7 G/DL (ref 32–36)
MCV RBC AUTO: 87 FL (ref 80–100)
NRBC BLD-RTO: 0 /100 WBCS (ref 0–0)
PLATELET # BLD AUTO: 236 X10*3/UL (ref 150–450)
PMV BLD AUTO: 10.5 FL (ref 7.5–11.5)
POTASSIUM SERPL-SCNC: 3.8 MMOL/L (ref 3.4–5.1)
RBC # BLD AUTO: 3.08 X10*6/UL (ref 4–5.2)
SODIUM SERPL-SCNC: 131 MMOL/L (ref 133–145)
WBC # BLD AUTO: 4.2 X10*3/UL (ref 4.4–11.3)

## 2023-10-26 PROCEDURE — 82947 ASSAY GLUCOSE BLOOD QUANT: CPT

## 2023-10-26 PROCEDURE — 92526 ORAL FUNCTION THERAPY: CPT | Mod: GN | Performed by: SPEECH-LANGUAGE PATHOLOGIST

## 2023-10-26 PROCEDURE — 36415 COLL VENOUS BLD VENIPUNCTURE: CPT | Performed by: INTERNAL MEDICINE

## 2023-10-26 PROCEDURE — 84295 ASSAY OF SERUM SODIUM: CPT | Performed by: INTERNAL MEDICINE

## 2023-10-26 PROCEDURE — 96361 HYDRATE IV INFUSION ADD-ON: CPT

## 2023-10-26 PROCEDURE — 2500000004 HC RX 250 GENERAL PHARMACY W/ HCPCS (ALT 636 FOR OP/ED): Performed by: INTERNAL MEDICINE

## 2023-10-26 PROCEDURE — 2500000004 HC RX 250 GENERAL PHARMACY W/ HCPCS (ALT 636 FOR OP/ED): Performed by: NURSE PRACTITIONER

## 2023-10-26 PROCEDURE — 2500000002 HC RX 250 W HCPCS SELF ADMINISTERED DRUGS (ALT 637 FOR MEDICARE OP, ALT 636 FOR OP/ED): Performed by: NURSE PRACTITIONER

## 2023-10-26 PROCEDURE — 96360 HYDRATION IV INFUSION INIT: CPT

## 2023-10-26 PROCEDURE — 85027 COMPLETE CBC AUTOMATED: CPT | Performed by: INTERNAL MEDICINE

## 2023-10-26 PROCEDURE — 2500000001 HC RX 250 WO HCPCS SELF ADMINISTERED DRUGS (ALT 637 FOR MEDICARE OP): Performed by: NURSE PRACTITIONER

## 2023-10-26 PROCEDURE — G0378 HOSPITAL OBSERVATION PER HR: HCPCS

## 2023-10-26 RX ORDER — POLYETHYLENE GLYCOL 3350 17 G/17G
17 POWDER, FOR SOLUTION ORAL DAILY PRN
Qty: 510 G | Refills: 0 | Status: SHIPPED | OUTPATIENT
Start: 2023-10-26

## 2023-10-26 RX ORDER — ACETAMINOPHEN 325 MG/1
650 TABLET ORAL EVERY 4 HOURS PRN
Qty: 30 TABLET | Refills: 0 | Status: SHIPPED | OUTPATIENT
Start: 2023-10-26

## 2023-10-26 RX ADMIN — FOLIC ACID 1 MG: 1 TABLET ORAL at 09:33

## 2023-10-26 RX ADMIN — LEVOTHYROXINE SODIUM 100 MCG: 0.1 TABLET ORAL at 06:35

## 2023-10-26 RX ADMIN — HYDROCORTISONE 5 MG: 5 TABLET ORAL at 11:11

## 2023-10-26 RX ADMIN — SODIUM BICARBONATE 650 MG TABLET 1300 MG: at 09:33

## 2023-10-26 RX ADMIN — PANTOPRAZOLE SODIUM 40 MG: 40 TABLET, DELAYED RELEASE ORAL at 06:35

## 2023-10-26 RX ADMIN — DEXTROSE MONOHYDRATE AND SODIUM CHLORIDE 125 ML/HR: 5; .9 INJECTION, SOLUTION INTRAVENOUS at 01:54

## 2023-10-26 RX ADMIN — POTASSIUM CHLORIDE 20 MEQ: 1.5 FOR SOLUTION ORAL at 09:33

## 2023-10-26 RX ADMIN — ASPIRIN 81 MG CHEWABLE TABLET 81 MG: 81 TABLET CHEWABLE at 09:33

## 2023-10-26 ASSESSMENT — COGNITIVE AND FUNCTIONAL STATUS - GENERAL
WALKING IN HOSPITAL ROOM: TOTAL
DRESSING REGULAR LOWER BODY CLOTHING: A LOT
CLIMB 3 TO 5 STEPS WITH RAILING: TOTAL
DAILY ACTIVITIY SCORE: 12
TURNING FROM BACK TO SIDE WHILE IN FLAT BAD: A LITTLE
MOBILITY SCORE: 12
HELP NEEDED FOR BATHING: A LOT
EATING MEALS: A LOT
DRESSING REGULAR UPPER BODY CLOTHING: A LOT
PERSONAL GROOMING: A LOT
MOVING TO AND FROM BED TO CHAIR: A LOT
STANDING UP FROM CHAIR USING ARMS: A LOT
TOILETING: A LOT
MOVING FROM LYING ON BACK TO SITTING ON SIDE OF FLAT BED WITH BEDRAILS: A LITTLE

## 2023-10-26 ASSESSMENT — PAIN - FUNCTIONAL ASSESSMENT
PAIN_FUNCTIONAL_ASSESSMENT: 0-10
PAIN_FUNCTIONAL_ASSESSMENT: 0-10

## 2023-10-26 ASSESSMENT — PAIN SCALES - GENERAL
PAINLEVEL_OUTOF10: 0 - NO PAIN
PAINLEVEL_OUTOF10: 0 - NO PAIN

## 2023-10-26 NOTE — DISCHARGE SUMMARY
Discharge Diagnosis  Hypoglycemia    Issues Requiring Follow-Up  Primary care physician follow-up    Discharge Meds     Your medication list        START taking these medications        Instructions Last Dose Given Next Dose Due   acetaminophen 325 mg tablet  Commonly known as: Tylenol      Take 2 tablets (650 mg) by mouth every 4 hours if needed for fever (temp greater than 38.0 C) (greater than or equal to 38 C).       polyethylene glycol packet  Commonly known as: Glycolax, Miralax      Take 17 g by mouth once daily as needed (constipation).              CONTINUE taking these medications        Instructions Last Dose Given Next Dose Due   aspirin 81 mg chewable tablet           atorvastatin 40 mg tablet  Commonly known as: Lipitor           folic acid 1 mg tablet  Commonly known as: Folvite           hydrocortisone 5 mg tablet  Commonly known as: Cortef           hydrOXYzine pamoate 25 mg capsule  Commonly known as: Vistaril           levothyroxine 100 mcg tablet  Commonly known as: Synthroid, Levoxyl           melatonin 5 mg capsule           mirtazapine 15 mg tablet  Commonly known as: Remeron           ondansetron 4 mg tablet  Commonly known as: Zofran           pantoprazole 40 mg EC tablet  Commonly known as: ProtoNix           potassium chloride 20 mEq packet  Commonly known as: Klor-Con           sodium bicarbonate 650 mg tablet           sulfamethoxazole-trimethoprim 800-160 mg tablet  Commonly known as: Bactrim DS                     Where to Get Your Medications        These medications were sent to [facility]  [address], [address], [place] [address]      Hours: 9 AM to 6 PM Mon-Fri, 9 AM to 1 PM Sat Phone: [phone]   acetaminophen 325 mg tablet  polyethylene glycol packet         Test Results Pending At Discharge  Pending Labs       Order Current Status    Insulin, free In process            Hospital Course   58-year-old  female presents to the emergency department  with hypoglycemia and fall at a nearby nursing facility she remains due to Warnicke's encephalopathy.  She has a known history of panhypopituitarism she had no recurrence of hypoglycemia her electrolytes were reasonably stable and at baseline she was medically stable for discharge with dietary supplements outpatient follow-up with her primary care physician and return to skilled facility today    Pertinent Physical Exam At Time of Discharge  Physical Exam  Constitutional:       Appearance: Normal appearance.   HENT:      Head: Normocephalic and atraumatic.      Mouth/Throat:      Mouth: Mucous membranes are moist.   Eyes:      Extraocular Movements: Extraocular movements intact.      Pupils: Pupils are equal, round, and reactive to light.   Cardiovascular:      Rate and Rhythm: Normal rate and regular rhythm.      Pulses: Normal pulses.      Heart sounds: Normal heart sounds.   Pulmonary:      Effort: Pulmonary effort is normal.      Breath sounds: Normal breath sounds.   Abdominal:      General: Abdomen is flat.      Palpations: Abdomen is soft.   Musculoskeletal:         General: Normal range of motion.      Cervical back: Normal range of motion and neck supple.   Skin:     General: Skin is warm.      Capillary Refill: Capillary refill takes less than 2 seconds.   Neurological:      General: No focal deficit present.      Mental Status: She is alert and oriented to person, place, and time.   Psychiatric:         Mood and Affect: Mood normal.         Outpatient Follow-Up  Future Appointments   Date Time Provider Department Center   11/27/2023  3:00 PM Rickey Mcgarry MD [id number] [facility]   1/11/2024 10:00 AM Prudence Narvaez MD [id number] [facility]         Nuno Moralez DO

## 2023-10-26 NOTE — CARE PLAN
The patient's goals for the shift include      The clinical goals for the shift include no falls    Over the shift, the patient did not make progress toward the following goals. Barriers to progression include   Problem: Fall/Injury  Goal: Not fall by end of shift  Outcome: Progressing  Goal: Be free from injury by end of the shift  Outcome: Progressing  Goal: Verbalize understanding of personal risk factors for fall in the hospital  Outcome: Progressing  Goal: Verbalize understanding of risk factor reduction measures to prevent injury from fall in the home  Outcome: Progressing  Goal: Use assistive devices by end of the shift  Outcome: Progressing  Goal: Pace activities to prevent fatigue by end of the shift  Outcome: Progressing   . Recommendations to address these barriers include .

## 2023-10-26 NOTE — PROGRESS NOTES
Speech-Language Pathology    Inpatient Speech Language Pathology Treatment Note     Patient Name: Ileana White  MRN: [id number]  : 1965  Today's Date: 10/26/23  Time Calculation  Start Time: 942  Stop Time:   Time Calculation (min): 20 min         PLAN:  SLP TX Plan: Discharge from Speech Therapy  Discussed POC: Patient  Discussed Risks/Benefits: Yes  Patient/Caregiver Agreeable: Yes       Recommendations:      Liquid Diet Recommendations: Regular and Thin (IDDSI Level 0)  Medication administration:     Subjective:  Pt. Seen at bedside for skilled dysphagia treatment. She is pleasant and participative. Sitting upright in bed. MUCH more alert today. Appears at her baseline.      Pain:  Pain Assessment  Pain Assessment: 0-10  Pain Score: 0 - No pain  Pain Type: Acute pain  Pain Location: Throat         Oxygen Status:   nasal cannula             Goals:   1) Pt to tolerate recommended diet without overt s/s of aspiration - goal met  2) Pt to demonstrate functional application and knowledge of compensatory swallowing strategies as trained - goal no longer indicated given pt's return to baseline mentation          SLP Assessment:  Pt participated in diagnostic trials of 5 oz thin water (3 oz successively swallowed), and 1 taryn cracker.     Oral phase - Oral mucosa moist and normal in coloration. Mastication, bolus manipulation, and oral clearance timely and functional.     Pharyngeal phase - Although it is a limited assessment technique; Hyolaryngeal elevation/excursion assessed via digital palpation and appeared consistent across all trials. No coughing, choking, nor change in vocal quality present throughout trials. No overt s/s of aspiration present at the bedside.       Treatment Outcome:     Pt tolerating diet without overt s/s of aspiration. Mentation improved. discontinue SLP tx.       Prognosis: Excellent       Medical Staff Made Aware: Yes d/w PCA      Education:  Pt. Given skilled instruction on  updated POC and purpose of SLP visit  Pt. gave verbal understanding

## 2023-10-26 NOTE — PROGRESS NOTES
"Ileana White is a 58 y.o. female on day 0 of admission presenting with Hypoglycemia.    Subjective   During MDR rounds, RN informed TCSW pt will have an active discharge order for today. Pt will return back to [facility] today.   TCSW placed T CT [facility] at approx 1258 to arrange transport via stretcher. TCSW spoke with [facility] to confirm  for approx 1500. Pt's RN has been made aware.        Objective     Physical Exam    Last Recorded Vitals  Blood pressure 106/67, pulse 100, temperature 36.8 °C (98.2 °F), temperature source Oral, resp. rate 18, height 1.626 m (5' 4\"), weight 51.1 kg (112 lb 10.5 oz), SpO2 99 %.  Intake/Output last 3 Shifts:  I/O last 3 completed shifts:  In: 4238.3 (82.9 mL/kg) [P.O.:480; I.V.:3758.3 (73.5 mL/kg)]  Out: 500 (9.8 mL/kg) [Urine:500 (0.3 mL/kg/hr)]  Weight: 51.1 kg     Relevant Results                             Assessment/Plan   Principal Problem:    Hypoglycemia  Active Problems:    Adrenal insufficiency (CMS/HCC)    Athscl heart disease of native coronary artery w/o ang pctrs    Essential (primary) hypertension    Dyslipidemia    Gastro-esophageal reflux disease without esophagitis    Hypothyroidism, acquired    Seizure disorder (CMS/HCC)    Wernicke's encephalopathy    Pt returning to [facility] today.            Apollo Walters, LCSW      "

## 2023-10-27 ENCOUNTER — NURSING HOME VISIT (OUTPATIENT)
Dept: POST ACUTE CARE | Facility: EXTERNAL LOCATION | Age: 58
End: 2023-10-27
Payer: COMMERCIAL

## 2023-10-27 DIAGNOSIS — K21.9 GASTRO-ESOPHAGEAL REFLUX DISEASE WITHOUT ESOPHAGITIS: ICD-10-CM

## 2023-10-27 DIAGNOSIS — E51.2 WERNICKE'S ENCEPHALOPATHY: ICD-10-CM

## 2023-10-27 DIAGNOSIS — E03.9 HYPOTHYROIDISM, ACQUIRED: ICD-10-CM

## 2023-10-27 DIAGNOSIS — E27.40 ADRENAL INSUFFICIENCY (MULTI): ICD-10-CM

## 2023-10-27 DIAGNOSIS — E16.2 HYPOGLYCEMIA: ICD-10-CM

## 2023-10-27 DIAGNOSIS — E78.5 DYSLIPIDEMIA: ICD-10-CM

## 2023-10-27 DIAGNOSIS — Z91.81 HISTORY OF RECENT FALL: Primary | ICD-10-CM

## 2023-10-27 DIAGNOSIS — G40.909 SEIZURE DISORDER (MULTI): ICD-10-CM

## 2023-10-27 PROCEDURE — 99310 SBSQ NF CARE HIGH MDM 45: CPT | Performed by: INTERNAL MEDICINE

## 2023-10-27 NOTE — PROGRESS NOTES
Subjective   Patient ID: Ileana White is a 58 y.o. female who is long term resident being seen and evaluated for multiple medical problems.    Patient is a 58-year-old female with history of cognitive impairment, adrenal insufficiency, hypothyroidism, recently diagnosed with a UTI who had a fall and was seen in the ER.  Patient had some bruise over her left eye.  Patient CT scan was negative and patient was sent back.  Patient was having still confusion and was having altered mental status and patient was sent back to the ER.  Patient was found to have hypoglycemia.  Patient was evaluated in the ER as well as in the hospital and was monitored.  Patient was seen by specialist.  Patient has been continued on antibiotics for UTI.  All lab work reviewed from the hospital.         Review of Systems  Negative for fever or chills  Negative for sore throat, ear pain, nasal discharge  Negative for cough, shortness of breath or wheezing  Negative for chest pain, palpitations, swelling of legs  Negative for abdominal pain, constipation, diarrhea, blood in the stools  Negative for urinary complaints  Negative for headache, dizziness, weakness or numbness  Negative for joint pain  Positive for confusion  All other systems reviewed and were negative   Objective   Vital signs are stable    Physical Exam  Constitutional: Patient does not appear to be in any acute distress  HEENT.  Patient has bruise over her left eye.  Vision is normal   cardiovascular: RRR, S1/S2, no murmurs, rubs, or gallops, no edema of extremities  Pulmonary: CTAB, no respiratory distress.  Abdomen: +BS, soft, non-tender, nondistended, no guarding  no palpable mass   MSK: No joint swelling, normal movements of all extremities. Range of motion- normal.  Skin- No lesions, contusions, or erythema.  Peripheral puslses palpable bilaterally 2+  Neuro: AAO X1, Cranial nerves 2-12 grossly intact,DTR 2+ in all 4 limbs   Psychiatric: Judgment intact. Appropriate mood and  behavior   Assessment/Plan   Diagnoses and all orders for this visit:  History of recent fall  Dyslipidemia  Adrenal insufficiency (CMS/HCC)  Hypoglycemia  Gastro-esophageal reflux disease without esophagitis  Hypothyroidism, acquired  Seizure disorder (CMS/HCC)  Wernicke's encephalopathy  Patient had a fall and was sent to the ER.  Evaluated in the ER CT scans done for head and neck which are normal.  Patient also had confusion and had hypoglycemia so patient was admitted to the hospital for observation.  Patient continued on antibiotics for UTI.  Patient has been recommended to follow-up with the endocrinologist

## 2023-10-27 NOTE — LETTER
Patient: Ileana White  : 1965    Encounter Date: 10/27/2023    Subjective  Patient ID: Ileana White is a 58 y.o. female who is long term resident being seen and evaluated for multiple medical problems.    Patient is a 58-year-old female with history of cognitive impairment, adrenal insufficiency, hypothyroidism, recently diagnosed with a UTI who had a fall and was seen in the ER.  Patient had some bruise over her left eye.  Patient CT scan was negative and patient was sent back.  Patient was having still confusion and was having altered mental status and patient was sent back to the ER.  Patient was found to have hypoglycemia.  Patient was evaluated in the ER as well as in the hospital and was monitored.  Patient was seen by specialist.  Patient has been continued on antibiotics for UTI.  All lab work reviewed from the hospital.         Review of Systems  Negative for fever or chills  Negative for sore throat, ear pain, nasal discharge  Negative for cough, shortness of breath or wheezing  Negative for chest pain, palpitations, swelling of legs  Negative for abdominal pain, constipation, diarrhea, blood in the stools  Negative for urinary complaints  Negative for headache, dizziness, weakness or numbness  Negative for joint pain  Positive for confusion  All other systems reviewed and were negative   Objective  Vital signs are stable    Physical Exam  Constitutional: Patient does not appear to be in any acute distress  HEENT.  Patient has bruise over her left eye.  Vision is normal   cardiovascular: RRR, S1/S2, no murmurs, rubs, or gallops, no edema of extremities  Pulmonary: CTAB, no respiratory distress.  Abdomen: +BS, soft, non-tender, nondistended, no guarding  no palpable mass   MSK: No joint swelling, normal movements of all extremities. Range of motion- normal.  Skin- No lesions, contusions, or erythema.  Peripheral puslses palpable bilaterally 2+  Neuro: AAO X1, Cranial nerves 2-12 grossly intact,DTR 2+  in all 4 limbs   Psychiatric: Judgment intact. Appropriate mood and behavior   Assessment/Plan  Diagnoses and all orders for this visit:  History of recent fall  Dyslipidemia  Adrenal insufficiency (CMS/HCC)  Hypoglycemia  Gastro-esophageal reflux disease without esophagitis  Hypothyroidism, acquired  Seizure disorder (CMS/HCC)  Wernicke's encephalopathy  Patient had a fall and was sent to the ER.  Evaluated in the ER CT scans done for head and neck which are normal.  Patient also had confusion and had hypoglycemia so patient was admitted to the hospital for observation.  Patient continued on antibiotics for UTI.  Patient has been recommended to follow-up with the endocrinologist      Electronically Signed By: Onofre Renteria MD   11/8/23 11:25 AM

## 2023-10-28 LAB
INSULIN FREE SERPL-ACNC: 1 UIU/ML (ref 3–25)
INSULIN SERPL-ACNC: 2 UIU/ML (ref 3–25)

## 2023-11-08 ENCOUNTER — APPOINTMENT (OUTPATIENT)
Dept: GASTROENTEROLOGY | Facility: CLINIC | Age: 58
End: 2023-11-08
Payer: COMMERCIAL

## 2023-11-08 NOTE — PROGRESS NOTES
Subjective   Patient ID: Ileana White is a 58 y.o. female who is long term resident being seen and evaluated for multiple medical problems.    Patient is 58-year-old female with history of adrenal insufficiency, history of muscle weakness, difficulty in walking, cognitive impairment, altered mental status due to Wernicke's encephalopathy, urinary tract infection, hypokalemia and hypothyroidism who is being seen for follow-up at long-term care.  Patient lab work reviewed and patient TSH levels have been low.  Patient has central hypothyroidism and follows up with endocrinologist.  Patient is due to be seen by endocrinologist.           Review of Systems  Negative for fever or chills  Negative for sore throat, ear pain, nasal discharge  Negative for cough, shortness of breath or wheezing  Negative for chest pain, palpitations, swelling of legs  Negative for abdominal pain, constipation, diarrhea, blood in the stools  Negative for urinary complaints  Negative for headache, dizziness, weakness or numbness  Negative for joint pain  Positive for cognitive impairment  All other systems reviewed and were negative   Objective   Vital signs reviewed at the facility    Physical Exam  Constitutional: Patient does not appear to be in any acute distress  Cardiovascular: RRR, S1/S2, no murmurs, rubs, or gallops, radial pulses +2, no edema of extremities  Pulmonary: CTAB, no respiratory distress.  Abdomen: +BS, soft, non-tender, nondistended, no guarding or rebound, no masses noted  MSK: No joint swelling, normal movements of all extremities. Range of motion- normal.  Skin- No lesions, contusions, or erythema.  Peripheral puslses palpable bilaterally 2+  Neuro: AAO X1, Cranial nerves 2-12 grossly intact,DTR 2+ in all 4 limbs   Psychiatric: Judgment intact. Appropriate mood and behavior   Assessment/Plan   Diagnoses and all orders for this visit:  Essential (primary) hypertension  Dyslipidemia  Adrenal insufficiency  (CMS/HCC)  Hypoglycemia  Hypothyroidism, acquired  Gastro-esophageal reflux disease without esophagitis  Seizure disorder (CMS/HCC)  Wernicke's encephalopathy    Patient is on hydrocortisone for adrenal insufficiency and will follow-up with the endocrinologist  Patient is on atorvastatin for hyperlipidemia, patient is on mirtazapine,  On Synthroid and TSH levels are being monitored, patient is on Vistaril and seen by psych nurse practitioner at the facility.  Patient is not able to make her decisions.

## 2023-11-16 ENCOUNTER — NURSING HOME VISIT (OUTPATIENT)
Dept: POST ACUTE CARE | Facility: EXTERNAL LOCATION | Age: 58
End: 2023-11-16
Payer: COMMERCIAL

## 2023-11-16 DIAGNOSIS — K21.9 GASTROESOPHAGEAL REFLUX DISEASE WITHOUT ESOPHAGITIS: ICD-10-CM

## 2023-11-16 DIAGNOSIS — G40.909 SEIZURE DISORDER (MULTI): ICD-10-CM

## 2023-11-16 DIAGNOSIS — E78.5 HYPERLIPIDEMIA, UNSPECIFIED HYPERLIPIDEMIA TYPE: Primary | ICD-10-CM

## 2023-11-16 DIAGNOSIS — E03.9 HYPOTHYROIDISM, ACQUIRED: ICD-10-CM

## 2023-11-16 DIAGNOSIS — E27.40 ADRENAL INSUFFICIENCY (MULTI): ICD-10-CM

## 2023-11-16 DIAGNOSIS — E16.2 HYPOGLYCEMIA: ICD-10-CM

## 2023-11-16 PROCEDURE — 99309 SBSQ NF CARE MODERATE MDM 30: CPT | Performed by: INTERNAL MEDICINE

## 2023-11-16 NOTE — PROGRESS NOTES
Subjective   Patient ID: Ileana White is a 58 y.o. female who is long term resident being seen and evaluated for multiple medical problems.    Patient is a 58-year-old female with history of cognitive impairment, adrenal insufficiency, hypothyroidism,  was found to have hypoglycemia.  Patient was evaluated in the ER as well as in the hospital and was monitored.  Patient was seen by specialist.  Patient has been continued on antibiotics for UTI.  All lab work reviewed from the hospital.         Review of Systems  Negative for fever or chills  Negative for sore throat, ear pain, nasal discharge  Negative for cough, shortness of breath or wheezing  Negative for chest pain, palpitations, swelling of legs  Negative for abdominal pain, constipation, diarrhea, blood in the stools  Negative for urinary complaints  Negative for headache, dizziness, weakness or numbness  Negative for joint pain  Positive for confusion  All other systems reviewed and were negative   Objective   Vital signs are stable    Physical Exam  Constitutional: Patient does not appear to be in any acute distress  HEENT.  Patient has bruise over her left eye.  Vision is normal   cardiovascular: RRR, S1/S2, no murmurs, rubs, or gallops, no edema of extremities  Pulmonary: CTAB, no respiratory distress.  Abdomen: +BS, soft, non-tender, nondistended, no guarding  no palpable mass   MSK: No joint swelling, normal movements of all extremities. Range of motion- normal.  Skin- No lesions, contusions, or erythema.  Peripheral puslses palpable bilaterally 2+  Neuro: AAO X1, Cranial nerves 2-12 grossly intact,DTR 2+ in all 4 limbs   Psychiatric: Judgment intact. Appropriate mood and behavior   Assessment/Plan   Diagnoses and all orders for this visit:  History of recent fall  Dyslipidemia  Adrenal insufficiency (CMS/HCC)  Hypoglycemia  Gastro-esophageal reflux disease without esophagitis  Hypothyroidism, acquired  Seizure disorder (CMS/HCC)  Wernicke's  encephalopathy

## 2023-11-16 NOTE — LETTER
Patient: Ileana White  : 1965    Encounter Date: 2023    Subjective   Patient ID: Ileana White is a 58 y.o. female who is long term resident being seen and evaluated for multiple medical problems.    Patient is a 58-year-old female with history of cognitive impairment, adrenal insufficiency, hypothyroidism,  was found to have hypoglycemia.  Patient was evaluated in the ER as well as in the hospital and was monitored.  Patient was seen by specialist.  Patient has been continued on antibiotics for UTI.  All lab work reviewed from the hospital.         Review of Systems  Negative for fever or chills  Negative for sore throat, ear pain, nasal discharge  Negative for cough, shortness of breath or wheezing  Negative for chest pain, palpitations, swelling of legs  Negative for abdominal pain, constipation, diarrhea, blood in the stools  Negative for urinary complaints  Negative for headache, dizziness, weakness or numbness  Negative for joint pain  Positive for confusion  All other systems reviewed and were negative   Objective   Vital signs are stable    Physical Exam  Constitutional: Patient does not appear to be in any acute distress  HEENT.  Patient has bruise over her left eye.  Vision is normal   cardiovascular: RRR, S1/S2, no murmurs, rubs, or gallops, no edema of extremities  Pulmonary: CTAB, no respiratory distress.  Abdomen: +BS, soft, non-tender, nondistended, no guarding  no palpable mass   MSK: No joint swelling, normal movements of all extremities. Range of motion- normal.  Skin- No lesions, contusions, or erythema.  Peripheral puslses palpable bilaterally 2+  Neuro: AAO X1, Cranial nerves 2-12 grossly intact,DTR 2+ in all 4 limbs   Psychiatric: Judgment intact. Appropriate mood and behavior   Assessment/Plan   Diagnoses and all orders for this visit:  History of recent fall  Dyslipidemia  Adrenal insufficiency (CMS/HCC)  Hypoglycemia  Gastro-esophageal reflux disease without  esophagitis  Hypothyroidism, acquired  Seizure disorder (CMS/HCC)  Wernicke's encephalopathy      Electronically Signed By: Onofre Renteria MD   11/26/23  4:02 PM

## 2023-11-27 ENCOUNTER — OFFICE VISIT (OUTPATIENT)
Dept: GASTROENTEROLOGY | Facility: CLINIC | Age: 58
End: 2023-11-27
Payer: COMMERCIAL

## 2023-11-27 ENCOUNTER — NURSING HOME VISIT (OUTPATIENT)
Dept: POST ACUTE CARE | Facility: EXTERNAL LOCATION | Age: 58
End: 2023-11-27

## 2023-11-27 VITALS — HEIGHT: 64 IN | BODY MASS INDEX: 19.97 KG/M2 | WEIGHT: 117 LBS

## 2023-11-27 DIAGNOSIS — E27.40 ADRENAL INSUFFICIENCY (MULTI): ICD-10-CM

## 2023-11-27 DIAGNOSIS — E51.2 WERNICKE'S ENCEPHALOPATHY: ICD-10-CM

## 2023-11-27 DIAGNOSIS — E03.9 HYPOTHYROIDISM, ACQUIRED: ICD-10-CM

## 2023-11-27 DIAGNOSIS — Z87.898 HISTORY OF NAUSEA AND VOMITING: Primary | ICD-10-CM

## 2023-11-27 DIAGNOSIS — G40.909 SEIZURE DISORDER (MULTI): ICD-10-CM

## 2023-11-27 DIAGNOSIS — E78.5 DYSLIPIDEMIA: Primary | ICD-10-CM

## 2023-11-27 DIAGNOSIS — K21.9 GASTRO-ESOPHAGEAL REFLUX DISEASE WITHOUT ESOPHAGITIS: ICD-10-CM

## 2023-11-27 PROCEDURE — 99310 SBSQ NF CARE HIGH MDM 45: CPT | Performed by: INTERNAL MEDICINE

## 2023-11-27 PROCEDURE — 3008F BODY MASS INDEX DOCD: CPT | Performed by: INTERNAL MEDICINE

## 2023-11-27 PROCEDURE — 99203 OFFICE O/P NEW LOW 30 MIN: CPT | Performed by: INTERNAL MEDICINE

## 2023-11-27 ASSESSMENT — ENCOUNTER SYMPTOMS
DIFFICULTY URINATING: 0
HEADACHES: 0
ROS GI COMMENTS: AS PER HPI
CHILLS: 0
MYALGIAS: 0
FEVER: 0
BRUISES/BLEEDS EASILY: 0
SORE THROAT: 0
ARTHRALGIAS: 0
EYE REDNESS: 0
NERVOUS/ANXIOUS: 0
FATIGUE: 0
SHORTNESS OF BREATH: 0
UNEXPECTED WEIGHT CHANGE: 0
ADENOPATHY: 0

## 2023-11-27 NOTE — PROGRESS NOTES
Subjective     History of Present Illness   Ileana White is a 58 y.o. female with PMHx of HLD, seizure d/o, panhypopituitarism, adrenal insufficiency, cognitive impairment, Wernicke's encephalopathy who presents to GI clinic for further evaluation of nausea/vomiting?  She actually doesn't know why she is here today - she is also here with a nurse from her facility who does not know the reason for the appointment  Patient says that currently she feels well  Had been having nausea/vomiting - states this was after a shot?  Now she doesn't get that shot any more and doesn't have any further problems  No further symptoms after hospitalization in May  Eating Rice Krispies TID  Avoids spicy food, greasy foods  Does think she lost some weight?  Taking Zofran ODT if she needs it which helps    Chart review:  Was recently hospitalized with UTI, fall, and hypoglycemia  Admitted to [facility] with adrenal crisis (hypotension, electrolyte abnormalities), had nausea vomiting and was seen by GI, had EGD and gastric emptying study  EGD 5/15/23 with mild gastritis (biopsies = moderate chronic gastritis, H. Pylori negative, focal duodenal villous blunting - nonspecific)  GES was non-diagnostic since she vomited immediately after drinking solution  Had cholecystectomy 5/24/23    Past Medical History  As per HPI.     Social History  she  reports that she has been smoking cigarettes. She has never used smokeless tobacco. She reports that she does not currently use alcohol. She reports that she does not currently use drugs.     Family History  her family history is not on file.   FHx not pertinent to presenting problem.    Review of Systems  Review of Systems   Constitutional:  Negative for chills, fatigue, fever and unexpected weight change.   HENT:  Negative for sore throat.    Eyes:  Negative for redness and visual disturbance.   Respiratory:  Negative for shortness of breath.    Cardiovascular:  Negative for chest pain.  "  Gastrointestinal:         As per HPI   Genitourinary:  Negative for difficulty urinating.   Musculoskeletal:  Positive for gait problem. Negative for arthralgias and myalgias.   Skin:  Negative for rash.   Neurological:  Negative for headaches.   Hematological:  Negative for adenopathy. Does not bruise/bleed easily.   Psychiatric/Behavioral:  The patient is not nervous/anxious.    All other systems reviewed and are negative.      Allergies  Allergies   Allergen Reactions    Egg Unknown    Morphine Unknown       Medications  Current Outpatient Medications   Medication Instructions    acetaminophen (TYLENOL) 650 mg, oral, Every 4 hours PRN    aspirin 81 mg, oral, Daily    atorvastatin (LIPITOR) 40 mg, oral, Daily    folic acid (FOLVITE) 1 mg, oral, Daily    hydrocortisone (CORTEF) 5 mg, oral, Daily    hydrOXYzine pamoate (VISTARIL) 25 mg, oral, 3 times daily PRN    levothyroxine (SYNTHROID, LEVOXYL) 100 mcg, oral, Daily before breakfast    melatonin 5 mg, oral, Nightly    mirtazapine (REMERON) 15 mg, oral, Nightly    ondansetron (ZOFRAN) 4 mg, oral, Every 8 hours    pantoprazole (PROTONIX) 40 mg, oral, Daily before breakfast, Do not crush, chew, or split.    polyethylene glycol (Glycolax, Miralax) 17 gram/dose powder Mix 17 grams into 4-8 ounces of water or juice and take by mouth once daily as needed for constipation    potassium chloride (Klor-Con) 20 mEq packet 20 mEq, oral, Daily    sodium bicarbonate 1,300 mg, oral, 2 times daily        Objective     Visit Vitals  Ht 1.626 m (5' 4\")   Wt 53.1 kg (117 lb)   BMI 20.08 kg/m²   Smoking Status Every Day   BSA 1.55 m²       Physical Exam  Constitutional:       General: She is not in acute distress.     Comments: In wheelchair   HENT:      Mouth/Throat:      Mouth: Mucous membranes are moist.   Eyes:      Extraocular Movements: Extraocular movements intact.   Cardiovascular:      Rate and Rhythm: Normal rate and regular rhythm.   Pulmonary:      Breath sounds: Normal " breath sounds.   Abdominal:      General: Bowel sounds are normal. There is no distension.      Palpations: Abdomen is soft.      Tenderness: There is no abdominal tenderness. There is no guarding or rebound.   Musculoskeletal:         General: No swelling.   Skin:     General: Skin is warm and dry.   Neurological:      General: No focal deficit present.      Mental Status: She is alert.   Psychiatric:         Mood and Affect: Mood normal.         Behavior: Behavior normal.           Lab Results   Component Value Date    WBC 4.2 (L) 10/26/2023    WBC 5.4 10/25/2023    HGB 8.8 (L) 10/26/2023    HGB 10.9 (L) 10/25/2023    HCT 26.9 (L) 10/26/2023    HCT 34.0 (L) 10/25/2023    MCV 87 10/26/2023    MCV 88 10/25/2023     10/26/2023     10/25/2023     Lab Results   Component Value Date     (L) 10/26/2023     (L) 10/25/2023    K 3.8 10/26/2023    K 4.0 10/25/2023     10/26/2023     10/25/2023    CO2 20 (L) 10/26/2023    CO2 21 (L) 10/25/2023    BUN 3 (L) 10/26/2023    BUN 6 (L) 10/25/2023    CREATININE 0.70 10/26/2023    CREATININE 0.80 10/25/2023    CALCIUM 7.3 (L) 10/26/2023    CALCIUM 8.3 (L) 10/25/2023    PROT 6.0 10/24/2023    PROT 4.4 (L) 06/16/2023    BILITOT 0.6 10/24/2023    BILITOT 0.6 06/16/2023    ALKPHOS 128 (H) 10/24/2023    ALKPHOS 94 06/16/2023    ALT 11 10/24/2023    ALT 22 06/16/2023    AST 38 10/24/2023    AST 49 (H) 06/16/2023    GLUCOSE 122 (H) 10/26/2023    GLUCOSE 94 10/25/2023       Recent Imaging  No results found.    Assessment/Plan    Ileana White is a 58 y.o. female with PMHx of HLD, seizure d/o, panhypopituitarism, adrenal insufficiency, cognitive impairment, Wernicke's encephalopathy who presents to GI clinic for prior history of nausea and vomiting.    History of nausea and vomiting  She was admitted to [facility] back in May 2023 due to acute adrenal crisis with hypotension and electrolyte abnormalities.  She was evaluated there by GI due to intractable vomiting  - had EGD that showed some gastritis and duodenitis, but nonspecific findings.  She did end up having HIDA scan that showed biliary dyskinesia so had cholecystectomy.  She states that since that hospitalization she feels fine, has been able to eat most things (avoids spicy and greasy foods), uses Zofran as needed.  She did not know why this appointment was made today.  Since she is feeling normal now, there is no need for further testing.  It is possible that her symptoms were secondary to her adrenal crisis.  She can continue PPI and PRN Zofran, and can follow up PRN.         Rickey Mcgarry MD

## 2023-11-27 NOTE — PATIENT INSTRUCTIONS
I'm glad you are feeling better!  I would not recommend any further GI testing since you are doing better.  Follow up as needed.

## 2023-11-27 NOTE — LETTER
Patient: Ileana White  : 1965    Encounter Date: 2023    Subjective   Patient ID: Ileana White is a 58 y.o. female who is long term resident being seen and evaluated for multiple medical problems.    Patient is a 58-year-old female with history of cognitive impairment, adrenal insufficiency, hypothyroidism,  is being seen for folow up at the nursing home.pt. has been eating well and blood sugars have been better.labs are stable.No recent falls.       Review of Systems  Negative for fever or chills  Negative for sore throat, ear pain, nasal discharge  Negative for cough, shortness of breath or wheezing  Negative for chest pain, palpitations, swelling of legs  Negative for abdominal pain, constipation, diarrhea, blood in the stools  Negative for urinary complaints  Negative for headache, dizziness, weakness or numbness  Negative for joint pain  Positive for confusion  All other systems reviewed and were negative   Objective   Vital signs are stable    Physical Exam  Constitutional: Patient does not appear to be in any acute distress  HEENT.  Patient has bruise over her left eye.  Vision is normal   cardiovascular: RRR, S1/S2, no murmurs, rubs, or gallops, no edema of extremities  Pulmonary: CTAB, no respiratory distress.  Abdomen: +BS, soft, non-tender, nondistended, no guarding  no palpable mass   MSK: No joint swelling, normal movements of all extremities. Range of motion- normal.  Skin- No lesions, contusions, or erythema.  Peripheral puslses palpable bilaterally 2+  Neuro: AAO X1, Cranial nerves 2-12 grossly intact,DTR 2+ in all 4 limbs   Psychiatric: Judgment intact. Appropriate mood and behavior   Assessment/Plan   Diagnoses and all orders for this visit:  Dyslipidemia  Adrenal insufficiency (CMS/HCC)  Hypoglycemia  Gastro-esophageal reflux disease without esophagitis  Hypothyroidism, acquired  Seizure disorder (CMS/[facility])  Wernicke's encephalopathy    Patient has been doing well.  Patient blood sugars  have been stable and no recent episodes of hypoglycemia.  All other labs reviewed and have been stable.  Patient has been eating well        Onofre Renteria MD       Electronically Signed By: Onofre Renteria MD   12/13/23  7:55 PM

## 2023-12-04 NOTE — PROGRESS NOTES
Subjective   Patient ID: Ileana White is a 58 y.o. female who is long term resident being seen and evaluated for multiple medical problems.    Patient is a 58-year-old female with history of cognitive impairment, adrenal insufficiency, hypothyroidism,  is being seen for folow up at the nursing home.pt. has been eating well and blood sugars have been better.labs are stable.No recent falls.       Review of Systems  Negative for fever or chills  Negative for sore throat, ear pain, nasal discharge  Negative for cough, shortness of breath or wheezing  Negative for chest pain, palpitations, swelling of legs  Negative for abdominal pain, constipation, diarrhea, blood in the stools  Negative for urinary complaints  Negative for headache, dizziness, weakness or numbness  Negative for joint pain  Positive for confusion  All other systems reviewed and were negative   Objective   Vital signs are stable    Physical Exam  Constitutional: Patient does not appear to be in any acute distress  HEENT.  Patient has bruise over her left eye.  Vision is normal   cardiovascular: RRR, S1/S2, no murmurs, rubs, or gallops, no edema of extremities  Pulmonary: CTAB, no respiratory distress.  Abdomen: +BS, soft, non-tender, nondistended, no guarding  no palpable mass   MSK: No joint swelling, normal movements of all extremities. Range of motion- normal.  Skin- No lesions, contusions, or erythema.  Peripheral puslses palpable bilaterally 2+  Neuro: AAO X1, Cranial nerves 2-12 grossly intact,DTR 2+ in all 4 limbs   Psychiatric: Judgment intact. Appropriate mood and behavior   Assessment/Plan   Diagnoses and all orders for this visit:  Dyslipidemia  Adrenal insufficiency (CMS/HCC)  Hypoglycemia  Gastro-esophageal reflux disease without esophagitis  Hypothyroidism, acquired  Seizure disorder (CMS/HCC)  Wernicke's encephalopathy    Patient has been doing well.  Patient blood sugars have been stable and no recent episodes of hypoglycemia.  All other  labs reviewed and have been stable.  Patient has been eating well        Onofre Renteria MD

## 2024-01-08 ENCOUNTER — NURSING HOME VISIT (OUTPATIENT)
Dept: POST ACUTE CARE | Facility: EXTERNAL LOCATION | Age: 59
End: 2024-01-08
Payer: COMMERCIAL

## 2024-01-08 DIAGNOSIS — E78.5 DYSLIPIDEMIA: Primary | ICD-10-CM

## 2024-01-08 DIAGNOSIS — E27.40 ADRENAL INSUFFICIENCY (MULTI): ICD-10-CM

## 2024-01-08 DIAGNOSIS — E51.2 WERNICKE'S ENCEPHALOPATHY: ICD-10-CM

## 2024-01-08 DIAGNOSIS — E03.9 HYPOTHYROIDISM, ACQUIRED: ICD-10-CM

## 2024-01-08 DIAGNOSIS — K21.9 GASTRO-ESOPHAGEAL REFLUX DISEASE WITHOUT ESOPHAGITIS: ICD-10-CM

## 2024-01-08 DIAGNOSIS — E16.2 HYPOGLYCEMIA: ICD-10-CM

## 2024-01-08 DIAGNOSIS — G40.909 SEIZURE DISORDER (MULTI): ICD-10-CM

## 2024-01-08 PROCEDURE — 99310 SBSQ NF CARE HIGH MDM 45: CPT | Performed by: INTERNAL MEDICINE

## 2024-01-08 NOTE — LETTER
Patient: Ileana White  : 1965    Encounter Date: 2024    Subjective   Patient ID: Ileana White is a 58 y.o. female who is long term resident being seen and evaluated for multiple medical problems.    Patient is a 58-year-old female with history of cognitive impairment, adrenal insufficiency, hypothyroidism,  is being seen for folow up at the nursing home.pt. has been eating well and blood sugars have been better.labs are stable.No recent falls.       Review of Systems  Negative for fever or chills  Negative for sore throat, ear pain, nasal discharge  Negative for cough, shortness of breath or wheezing  Negative for chest pain, palpitations, swelling of legs  Negative for abdominal pain, constipation, diarrhea, blood in the stools  Negative for urinary complaints  Negative for headache, dizziness, weakness or numbness  Negative for joint pain  Positive for confusion  All other systems reviewed and were negative   Objective   Vital signs are stable    Physical Exam  Constitutional: Patient does not appear to be in any acute distress  HEENT.  Patient has bruise over her left eye.  Vision is normal   cardiovascular: RRR, S1/S2, no murmurs, rubs, or gallops, no edema of extremities  Pulmonary: CTAB, no respiratory distress.  Abdomen: +BS, soft, non-tender, nondistended, no guarding  no palpable mass   MSK: No joint swelling, normal movements of all extremities. Range of motion- normal.  Skin- No lesions, contusions, or erythema.  Peripheral puslses palpable bilaterally 2+  Neuro: AAO X1, Cranial nerves 2-12 grossly intact,DTR 2+ in all 4 limbs   Psychiatric: Judgment intact. Appropriate mood and behavior   Assessment/Plan   Diagnoses and all orders for this visit:  Dyslipidemia  Adrenal insufficiency (CMS/[facility])  Hypoglycemia  Gastro-esophageal reflux disease without esophagitis  Hypothyroidism, acquired  Seizure disorder (CMS/[facility])  Wernicke's encephalopathy    Patient has been doing well.  Patient blood sugars  have been stable and no recent episodes of hypoglycemia.  All other labs reviewed and have been stable.  Patient has been eating well        Onofre Renteria MD       Electronically Signed By: Onofre Renteria MD   1/23/24 10:39 AM

## 2024-01-11 ENCOUNTER — OFFICE VISIT (OUTPATIENT)
Dept: ENDOCRINOLOGY | Facility: HOSPITAL | Age: 59
End: 2024-01-11
Payer: COMMERCIAL

## 2024-01-11 ENCOUNTER — LAB (OUTPATIENT)
Dept: LAB | Facility: LAB | Age: 59
End: 2024-01-11
Payer: COMMERCIAL

## 2024-01-11 VITALS
DIASTOLIC BLOOD PRESSURE: 91 MMHG | HEART RATE: 68 BPM | TEMPERATURE: 98 F | OXYGEN SATURATION: 99 % | SYSTOLIC BLOOD PRESSURE: 146 MMHG | BODY MASS INDEX: 23.92 KG/M2 | HEIGHT: 62 IN | WEIGHT: 130 LBS

## 2024-01-11 DIAGNOSIS — E27.40 ADRENAL INSUFFICIENCY (MULTI): ICD-10-CM

## 2024-01-11 DIAGNOSIS — E03.9 HYPOTHYROIDISM, ACQUIRED: ICD-10-CM

## 2024-01-11 DIAGNOSIS — E16.2 HYPOGLYCEMIA: ICD-10-CM

## 2024-01-11 DIAGNOSIS — E27.40 ADRENAL INSUFFICIENCY (MULTI): Primary | ICD-10-CM

## 2024-01-11 LAB
ALBUMIN SERPL BCP-MCNC: 4 G/DL (ref 3.4–5)
ANION GAP SERPL CALC-SCNC: 15 MMOL/L (ref 10–20)
BUN SERPL-MCNC: 22 MG/DL (ref 6–23)
CALCIUM SERPL-MCNC: 9.3 MG/DL (ref 8.6–10.6)
CHLORIDE SERPL-SCNC: 109 MMOL/L (ref 98–107)
CO2 SERPL-SCNC: 28 MMOL/L (ref 21–32)
CORTIS SERPL-MCNC: 3.4 UG/DL (ref 2.5–20)
CREAT SERPL-MCNC: 0.81 MG/DL (ref 0.5–1.05)
EGFRCR SERPLBLD CKD-EPI 2021: 85 ML/MIN/1.73M*2
ESTRADIOL SERPL-MCNC: <19 PG/ML
FSH SERPL-ACNC: 1.2 IU/L
GLUCOSE BLD MANUAL STRIP-MCNC: 79 MG/DL (ref 74–99)
GLUCOSE SERPL-MCNC: 75 MG/DL (ref 74–99)
LH SERPL-ACNC: 0.2 IU/L
PHOSPHATE SERPL-MCNC: 4.5 MG/DL (ref 2.5–4.9)
POTASSIUM SERPL-SCNC: 4.3 MMOL/L (ref 3.5–5.3)
PROLACTIN SERPL-MCNC: 2.9 UG/L (ref 3–20)
SODIUM SERPL-SCNC: 148 MMOL/L (ref 136–145)
T4 FREE SERPL-MCNC: 1.24 NG/DL (ref 0.78–1.48)

## 2024-01-11 PROCEDURE — 36416 COLLJ CAPILLARY BLOOD SPEC: CPT | Performed by: STUDENT IN AN ORGANIZED HEALTH CARE EDUCATION/TRAINING PROGRAM

## 2024-01-11 PROCEDURE — 82533 TOTAL CORTISOL: CPT

## 2024-01-11 PROCEDURE — 3008F BODY MASS INDEX DOCD: CPT | Performed by: STUDENT IN AN ORGANIZED HEALTH CARE EDUCATION/TRAINING PROGRAM

## 2024-01-11 PROCEDURE — 99215 OFFICE O/P EST HI 40 MIN: CPT | Performed by: STUDENT IN AN ORGANIZED HEALTH CARE EDUCATION/TRAINING PROGRAM

## 2024-01-11 PROCEDURE — 3077F SYST BP >= 140 MM HG: CPT | Performed by: STUDENT IN AN ORGANIZED HEALTH CARE EDUCATION/TRAINING PROGRAM

## 2024-01-11 PROCEDURE — 82670 ASSAY OF TOTAL ESTRADIOL: CPT

## 2024-01-11 PROCEDURE — 80069 RENAL FUNCTION PANEL: CPT

## 2024-01-11 PROCEDURE — 83002 ASSAY OF GONADOTROPIN (LH): CPT

## 2024-01-11 PROCEDURE — 84146 ASSAY OF PROLACTIN: CPT

## 2024-01-11 PROCEDURE — 82947 ASSAY GLUCOSE BLOOD QUANT: CPT | Performed by: STUDENT IN AN ORGANIZED HEALTH CARE EDUCATION/TRAINING PROGRAM

## 2024-01-11 PROCEDURE — 3080F DIAST BP >= 90 MM HG: CPT | Performed by: STUDENT IN AN ORGANIZED HEALTH CARE EDUCATION/TRAINING PROGRAM

## 2024-01-11 PROCEDURE — 84305 ASSAY OF SOMATOMEDIN: CPT

## 2024-01-11 PROCEDURE — 36415 COLL VENOUS BLD VENIPUNCTURE: CPT

## 2024-01-11 PROCEDURE — 82024 ASSAY OF ACTH: CPT

## 2024-01-11 PROCEDURE — 83001 ASSAY OF GONADOTROPIN (FSH): CPT

## 2024-01-11 PROCEDURE — 84439 ASSAY OF FREE THYROXINE: CPT

## 2024-01-11 ASSESSMENT — PAIN SCALES - GENERAL: PAINLEVEL: 0-NO PAIN

## 2024-01-11 ASSESSMENT — PATIENT HEALTH QUESTIONNAIRE - PHQ9
2. FEELING DOWN, DEPRESSED OR HOPELESS: NOT AT ALL
SUM OF ALL RESPONSES TO PHQ9 QUESTIONS 1 AND 2: 0
1. LITTLE INTEREST OR PLEASURE IN DOING THINGS: NOT AT ALL

## 2024-01-11 ASSESSMENT — ENCOUNTER SYMPTOMS
DEPRESSION: 0
OCCASIONAL FEELINGS OF UNSTEADINESS: 0
LOSS OF SENSATION IN FEET: 0

## 2024-01-11 NOTE — PATIENT INSTRUCTIONS
We will check blood work today to evaluate your hormones  Continue prednisone 5 mg twice daily morning and early afternoon  Continue levothyroxine 100 mcg daily  to be taken on an empty stomach on its own 30min before other meds or food   Decrease fludrocortisone to 0.1 mg once daily    Blood work today.  Continue to check BG    RTC in 6 months

## 2024-01-11 NOTE — PROGRESS NOTES
Subjective   Patient ID: Ileana White is a 58 y.o. 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 leading to central hypothyroidism, hypogonadotropic  hypogonadism and central adrenal insufficiency, CAD referred for evaluation of frequent low BG.    Was admitted for low BG in November to [facility] and since discharge her BG improved started on BG meds.  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 She is incontinent   Weight: Stable. When she was not eating and not taking meds she had no energy, and stable weight. Gets tired sometimes but does not sleep at.   Drinking and wetting her diaper more   Feels thirsty and lunch and breakfast 240ml per meal and big hospital cup 1 L up till 3 pm.  BM: Daily   Appetite: Good.  Polyuria, polydipsia and Nocturia: Yes polydipsia.  Cramps: Every now and then  Hot flashes: No  Night sweats: Yes. Room is so warm  No periods    Today she spit her meds  Blood work from earlier this month showed a Na: 147, K: 4,Cr: 1.1 Jayy 8.8  BG recent reviewed [phone]  Current meds:  Prednsione 5 mg twice daily  Fludrocortisone 0.1 mg BID  Levothyroxine 100 mcg daily  Takes her levothyroxine with all her other meds     Review of Systems  all pertinent systems reviewed and are otherwise negative   Objective   Vitals:    01/11/24 0953   BP: (!) 146/91   Pulse: 68   Temp: 36.7 °C (98 °F)   SpO2: 99%       Physical Exam  Constitutional:       General: She is not in acute distress.     Appearance: Normal appearance.      Comments: Confused  In  a wheel chair   Eyes:      Extraocular Movements: Extraocular movements intact.      Pupils: Pupils are equal, round, and reactive to light.   Cardiovascular:      Rate and Rhythm: Normal rate and regular rhythm.   Pulmonary:      Effort: Pulmonary effort is normal. No respiratory distress.      Breath sounds: Normal breath sounds.   Abdominal:      General: Bowel sounds are normal.      Palpations: Abdomen is soft.      Tenderness: There is no abdominal tenderness.   Skin:     Coloration: Skin is not jaundiced or pale.      Findings: No erythema or rash.   Neurological:      General: No focal deficit present.      Mental Status: She is alert and oriented to person, place, and time.      Deep Tendon Reflexes: Reflexes normal.   Psychiatric:         Mood and Affect: Mood normal.         Behavior: Behavior normal.         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 leading to central hypothyroidism, hypogonadotropic  hypogonadism and central adrenal insufficiency, CAD referred for evaluation of frequent low BG.    Was admitted for low BG in November to [facility] and since discharge her BG improved started on BG meds.  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  Today she spit her meds  Blood work from earlier this month showed a Na: 147, K: 4,Cr: 1.1 Jayy 8.8  BG recent reviewed [phone]  Current meds:  Prednsione 5 mg twice daily  Fludrocortisone 0.1 mg BID  Levothyroxine 100 mcg daily  Takes her levothyroxine with all her other meds     Plan:  We will check blood work today to evaluate your hormones  Continue  prednisone 5 mg twice daily morning and early afternoon  Continue levothyroxine 100 mcg daily  to be taken on an empty stomach on its own 30min before other meds or food   Decrease fludrocortisone to 0.1 mg once daily    Blood work today.  Continue to check BG    RTC in 6 months  Problem List Items Addressed This Visit       Adrenal insufficiency (CMS/HCC) - Primary    Relevant Orders    Adrenocorticotropic Hormone (ACTH) (Completed)    Cortisol (Completed)    FSH & LH (Completed)    Estradiol (Completed)    Insulin-Like Growth Factor 1 (Completed)    Thyroxine, Free (Completed)    Renal Function Panel (Completed)    Prolactin (Completed)    Osmolality, Urine    Hypothyroidism, acquired    Relevant Orders    Adrenocorticotropic Hormone (ACTH) (Completed)    Cortisol (Completed)    FSH & LH (Completed)    Estradiol (Completed)    Insulin-Like Growth Factor 1 (Completed)    Thyroxine, Free (Completed)    Renal Function Panel (Completed)    Prolactin (Completed)    Osmolality, Urine            Prudence Narvaez MD 01/11/24 10:46 AM

## 2024-01-13 LAB
IGF-I SERPL-MCNC: 61 NG/ML (ref 47–236)
IGF-I Z-SCORE SERPL: -1.6

## 2024-01-14 LAB — ACTH PLAS-MCNC: <1.5 PG/ML (ref 7.2–63.3)

## 2024-02-05 ENCOUNTER — NURSING HOME VISIT (OUTPATIENT)
Dept: POST ACUTE CARE | Facility: EXTERNAL LOCATION | Age: 59
End: 2024-02-05
Payer: COMMERCIAL

## 2024-02-05 DIAGNOSIS — E03.9 HYPOTHYROIDISM, ACQUIRED: ICD-10-CM

## 2024-02-05 DIAGNOSIS — I10 ESSENTIAL (PRIMARY) HYPERTENSION: ICD-10-CM

## 2024-02-05 DIAGNOSIS — E27.40 ADRENAL INSUFFICIENCY (MULTI): Primary | ICD-10-CM

## 2024-02-05 DIAGNOSIS — E78.5 DYSLIPIDEMIA: ICD-10-CM

## 2024-02-05 DIAGNOSIS — G40.909 SEIZURE DISORDER (MULTI): ICD-10-CM

## 2024-02-05 DIAGNOSIS — E16.2 HYPOGLYCEMIA: ICD-10-CM

## 2024-02-05 DIAGNOSIS — K21.9 GASTRO-ESOPHAGEAL REFLUX DISEASE WITHOUT ESOPHAGITIS: ICD-10-CM

## 2024-02-05 DIAGNOSIS — E51.2 WERNICKE'S ENCEPHALOPATHY: ICD-10-CM

## 2024-02-05 PROCEDURE — 99309 SBSQ NF CARE MODERATE MDM 30: CPT | Performed by: INTERNAL MEDICINE

## 2024-02-05 NOTE — LETTER
Patient: Ileana White  : 1965    Encounter Date: 2024    Subjective   Patient ID: Ileana White is a 58 y.o. female who is long term resident being seen and evaluated for multiple medical problems.    Patient is a 58-year-old female with history of cognitive impairment, adrenal insufficiency, hypothyroidism,  is being seen for folow up at the nursing home.pt. has been eating well and blood sugars have been better.labs are stable.No recent falls.       Review of Systems  Negative for fever or chills  Negative for sore throat, ear pain, nasal discharge  Negative for cough, shortness of breath or wheezing  Negative for chest pain, palpitations, swelling of legs  Negative for abdominal pain, constipation, diarrhea, blood in the stools  Negative for urinary complaints  Negative for headache, dizziness, weakness or numbness  Negative for joint pain  Positive for confusion  All other systems reviewed and were negative   Objective   Vital signs are stable    Physical Exam  Constitutional: Patient does not appear to be in any acute distress  HEENT.  Patient has bruise over her left eye.  Vision is normal   cardiovascular: RRR, S1/S2, no murmurs, rubs, or gallops, no edema of extremities  Pulmonary: CTAB, no respiratory distress.  Abdomen: +BS, soft, non-tender, nondistended, no guarding  no palpable mass   MSK: No joint swelling, normal movements of all extremities. Range of motion- normal.  Skin- No lesions, contusions, or erythema.  Peripheral puslses palpable bilaterally 2+  Neuro: AAO X1, Cranial nerves 2-12 grossly intact,DTR 2+ in all 4 limbs   Psychiatric: Judgment intact. Appropriate mood and behavior   Assessment/Plan   Diagnoses and all orders for this visit:  Dyslipidemia  Adrenal insufficiency (CMS/[facility])  Hypoglycemia  Gastro-esophageal reflux disease without esophagitis  Hypothyroidism, acquired  Seizure disorder (CMS/[facility])  Wernicke's encephalopathy    Patient has been doing well.  Patient blood sugars  have been stable and no recent episodes of hypoglycemia.  All other labs reviewed and have been stable.  Patient has been eating well        Onofre Renteria MD       Electronically Signed By: Onofre Renteria MD   2/25/24  8:36 PM

## 2024-03-13 ENCOUNTER — NURSING HOME VISIT (OUTPATIENT)
Dept: POST ACUTE CARE | Facility: EXTERNAL LOCATION | Age: 59
End: 2024-03-13
Payer: COMMERCIAL

## 2024-03-13 DIAGNOSIS — K21.9 GASTROESOPHAGEAL REFLUX DISEASE WITHOUT ESOPHAGITIS: ICD-10-CM

## 2024-03-13 DIAGNOSIS — G40.909 SEIZURE DISORDER (MULTI): ICD-10-CM

## 2024-03-13 DIAGNOSIS — E51.2 WERNICKE'S ENCEPHALOPATHY: ICD-10-CM

## 2024-03-13 DIAGNOSIS — E78.5 DYSLIPIDEMIA: ICD-10-CM

## 2024-03-13 DIAGNOSIS — E16.2 HYPOGLYCEMIA: ICD-10-CM

## 2024-03-13 DIAGNOSIS — E03.9 HYPOTHYROIDISM, ACQUIRED: ICD-10-CM

## 2024-03-13 DIAGNOSIS — E27.40 ADRENAL INSUFFICIENCY (MULTI): Primary | ICD-10-CM

## 2024-03-13 PROCEDURE — 99309 SBSQ NF CARE MODERATE MDM 30: CPT | Performed by: INTERNAL MEDICINE

## 2024-03-13 NOTE — LETTER
Patient: Ileana White  : 1965    Encounter Date: 2024                                          Nursing home visit note     Subjective   Patient ID: Ileana White is a 58 y.o. female who is long term resident being seen and evaluated for multiple medical problems.    Patient is a 58-year-old female with history of cognitive impairment, adrenal insufficiency, hypothyroidism,  is being seen for folow up at the nursing home.pt. has been eating well and blood sugars have been better.labs are stable.No recent falls.       Review of Systems  Negative for fever or chills  Negative for sore throat, ear pain, nasal discharge  Negative for cough, shortness of breath or wheezing  Negative for chest pain, palpitations, swelling of legs  Negative for abdominal pain, constipation, diarrhea, blood in the stools  Negative for urinary complaints  Negative for headache, dizziness, weakness or numbness  Negative for joint pain  Positive for confusion  All other systems reviewed and were negative   Objective   Vital signs are stable    Physical Exam  Constitutional: Patient does not appear to be in any acute distress  HEENT.  Patient has bruise over her left eye.  Vision is normal   cardiovascular: RRR, S1/S2, no murmurs, rubs, or gallops, no edema of extremities  Pulmonary: CTAB, no respiratory distress.  Abdomen: +BS, soft, non-tender, nondistended, no guarding  no palpable mass   MSK: No joint swelling, normal movements of all extremities. Range of motion- normal.  Skin- No lesions, contusions, or erythema.  Peripheral puslses palpable bilaterally 2+  Neuro: AAO X1, Cranial nerves 2-12 grossly intact,DTR 2+ in all 4 limbs   Psychiatric: Judgment intact. Appropriate mood and behavior   Assessment/Plan   Diagnoses and all orders for this visit:  Dyslipidemia  Adrenal insufficiency (CMS/[facility])  Hypoglycemia  Gastro-esophageal reflux disease without esophagitis  Hypothyroidism, acquired  Seizure disorder (CMS/[facility])  Wernicke's  encephalopathy    Patient has been doing well.  Patient blood sugars have been stable and no recent episodes of hypoglycemia.  All other labs reviewed and have been stable.  Patient has been eating well        Onofre Renteria MD       Electronically Signed By: Onofre Renteria MD   3/31/24 11:42 AM

## 2024-03-25 NOTE — PROGRESS NOTES
Nursing home visit note     Subjective   Patient ID: Ileana White is a 58 y.o. female who is long term resident being seen and evaluated for multiple medical problems.    Patient is a 58-year-old female with history of cognitive impairment, adrenal insufficiency, hypothyroidism,  is being seen for folow up at the nursing home.pt. has been eating well and blood sugars have been better.labs are stable.No recent falls.       Review of Systems  Negative for fever or chills  Negative for sore throat, ear pain, nasal discharge  Negative for cough, shortness of breath or wheezing  Negative for chest pain, palpitations, swelling of legs  Negative for abdominal pain, constipation, diarrhea, blood in the stools  Negative for urinary complaints  Negative for headache, dizziness, weakness or numbness  Negative for joint pain  Positive for confusion  All other systems reviewed and were negative   Objective   Vital signs are stable    Physical Exam  Constitutional: Patient does not appear to be in any acute distress  HEENT.  Patient has bruise over her left eye.  Vision is normal   cardiovascular: RRR, S1/S2, no murmurs, rubs, or gallops, no edema of extremities  Pulmonary: CTAB, no respiratory distress.  Abdomen: +BS, soft, non-tender, nondistended, no guarding  no palpable mass   MSK: No joint swelling, normal movements of all extremities. Range of motion- normal.  Skin- No lesions, contusions, or erythema.  Peripheral puslses palpable bilaterally 2+  Neuro: AAO X1, Cranial nerves 2-12 grossly intact,DTR 2+ in all 4 limbs   Psychiatric: Judgment intact. Appropriate mood and behavior   Assessment/Plan   Diagnoses and all orders for this visit:  Dyslipidemia  Adrenal insufficiency (CMS/HCC)  Hypoglycemia  Gastro-esophageal reflux disease without esophagitis  Hypothyroidism, acquired  Seizure disorder (CMS/HCC)  Wernicke's encephalopathy    Patient has been doing well.  Patient blood sugars  have been stable and no recent episodes of hypoglycemia.  All other labs reviewed and have been stable.  Patient has been eating well        Onofre Renteria MD

## 2024-05-04 NOTE — PROGRESS NOTES
[address]  Occupational Therapy  Daily Note  Time:   Time In: 3890  Time Out: 1036  Timed Code Treatment Minutes: 45 Minutes  Minutes: 38          Date: 2019  Patient Name: Ayden William,   Gender: female      Room: [facility]009-A  MRN: [id number]  : 1965  (49 y.o.)  Referring Practitioner: Dr. Emma Dueñas. BISHOP Del Toro  Diagnosis: acute metabolic encephalopathy  Additional Pertinent Hx: Per ED note, pt is a 49 y.o. female who presents to the Emergency Department via EMS for the evaluation of altered mental status. Per EMS, the patient has been like this for 6 days and the patient has not had any food or drink for the last 3 days. A friend found he bev the floor of her home today and called the squad. Restrictions/Precautions:  Restrictions/Precautions: General Precautions, Fall Risk    SUBJECTIVE: Pt seated in bedside chair upon arrival, agreeable to OT session. RN approved pt to shower. PAIN: Denies. COGNITION: Decreased Problem Solving, Decreased Safety Awareness and improved carryover for safety awareness and asking for reassurance for safe technique for functional transfers. ADL:   Feeding: Independent. Finishing breakfast tray. Grooming: Stand By Assistance. To wash face and pull hair into ponytail while seated on shower chair. Bathing: Stand By Assistance. SBA to wash syed area and bottom with 1 UE release from grab bar. Pt able to complete UB care and wash BLE including B feet while seated on shower chair. Upper Extremity Dressing: Minimal Assistance. Simone for line management to [facility]. Lower Extremity Dressing: Stand By Assistance. To doff socks while standing with 1 UE release from grab bar and don socks onto B feet while seated. Pt educated to complete doffing footwear while seated to increase safety and needs for assistance. Shower Transfer: Stand By Assistance and with verbal cues . Walk-in shower to/from shower chair.  Fletcher Jain Patient mobility status  with no difficulty. Provider aware     I have reviewed discharge instructions with the patient.  The patient verbalized understanding.    Patient left ED via Discharge Method: ambulatory to Home with  self .    Opportunity for questions and clarification provided.     Patient given 1 scripts.         v/c to use grab bars as assist.    BALANCE:  Sitting Balance:  Supervision  Standing Balance: Stand By Assistance  x1 minute within ADLs and in prep for mobility. TRANSFERS:  Sit to Stand:  Stand By Assistance. Stand to Sit: Stand By Assistance. FUNCTIONAL MOBILITY:  Assistive Device: None  Assist Level:  Contact Guard Assistance. Distance: To and from shower room  Completed functional mobility to/from walk-in shower room and within unit at fair pace, no LOB noted with min unsteadiness fluctuating from CGA-SBA. Pt requires seated rest break after trial of mobility, min fatigue noted. ASSESSMENT:  Activity Tolerance:  Patient tolerance of  treatment: good. Discharge Recommendations: Subacute/Skilled Nursing Facility  Equipment Recommendations: Other: Monitor pending progress  Plan: Times per week: 6x  Current Treatment Recommendations: Functional Mobility Training, Balance Training, Safety Education & Training, Self-Care / ADL, Endurance Training, Cognitive Reorientation    Patient Education  Patient Education: ADL strategies, Shower transfer, and Safety with transfers and mobility. Goals  Short term goals  Time Frame for Short term goals: 2 weeks  Short term goal 1: Complete various t/fs including toilet with S & 0-2 vcs for safety  Short term goal 2: Complete 5-6 min standing task with S for increased ease of sinkside grooming  Short term goal 3: Complete mobility to/from bathroom + in hallways with RW, S, & 0-2 vcs for walker safety for increased ease of retrieval of ADL items at home  Short term goal 4: Complete BADL routine with min A & min vcs for safety & sequencing  Long term goals  Time Frame for Long term goals : No LTG set d/t short ELOS    Following session, patient left in safe position with all fall risk precautions in place.

## 2024-07-03 ENCOUNTER — APPOINTMENT (OUTPATIENT)
Dept: ENDOCRINOLOGY | Facility: CLINIC | Age: 59
End: 2024-07-03
Payer: COMMERCIAL

## 2024-07-03 ENCOUNTER — APPOINTMENT (OUTPATIENT)
Dept: ENDOCRINOLOGY | Facility: HOSPITAL | Age: 59
End: 2024-07-03
Payer: COMMERCIAL

## 2024-07-10 ENCOUNTER — NURSING HOME VISIT (OUTPATIENT)
Dept: POST ACUTE CARE | Facility: EXTERNAL LOCATION | Age: 59
End: 2024-07-10
Payer: COMMERCIAL

## 2024-07-10 DIAGNOSIS — G40.909 SEIZURE DISORDER (MULTI): ICD-10-CM

## 2024-07-10 DIAGNOSIS — E51.2 WERNICKE'S ENCEPHALOPATHY: ICD-10-CM

## 2024-07-10 DIAGNOSIS — E27.40 ADRENAL INSUFFICIENCY (MULTI): ICD-10-CM

## 2024-07-10 DIAGNOSIS — E16.2 HYPOGLYCEMIA: ICD-10-CM

## 2024-07-10 DIAGNOSIS — E78.5 DYSLIPIDEMIA: Primary | ICD-10-CM

## 2024-07-10 DIAGNOSIS — E03.9 HYPOTHYROIDISM, ACQUIRED: ICD-10-CM

## 2024-07-10 PROCEDURE — 99309 SBSQ NF CARE MODERATE MDM 30: CPT | Performed by: INTERNAL MEDICINE

## 2024-07-10 NOTE — LETTER
Patient: Ileana White  : 1965    Encounter Date: 07/10/2024                                          Nursing home visit note     Subjective   Patient ID: Ileana White is a 58 y.o. female who is long term resident being seen and evaluated for multiple medical problems.    Patient is a 58-year-old female with history of cognitive impairment, adrenal insufficiency, hypothyroidism,  is being seen for folow up at the nursing home.pt. has been eating well and blood sugars have been better.labs are stable.No recent falls.       Review of Systems  Negative for fever or chills  Negative for sore throat, ear pain, nasal discharge  Negative for cough, shortness of breath or wheezing  Negative for chest pain, palpitations, swelling of legs  Negative for abdominal pain, constipation, diarrhea, blood in the stools  Negative for urinary complaints  Negative for headache, dizziness, weakness or numbness  Negative for joint pain  Positive for confusion  All other systems reviewed and were negative   Objective   Vital signs are stable    Physical Exam  Constitutional: Patient does not appear to be in any acute distress  HEENT.  Patient has bruise over her left eye.  Vision is normal   cardiovascular: RRR, S1/S2, no murmurs, rubs, or gallops, no edema of extremities  Pulmonary: CTAB, no respiratory distress.  Abdomen: +BS, soft, non-tender, nondistended, no guarding  no palpable mass   MSK: No joint swelling, normal movements of all extremities. Range of motion- normal.  Skin- No lesions, contusions, or erythema.  Peripheral puslses palpable bilaterally 2+  Neuro: AAO X1, Cranial nerves 2-12 grossly intact,DTR 2+ in all 4 limbs   Psychiatric: Judgment intact. Appropriate mood and behavior   Assessment/Plan   Diagnoses and all orders for this visit:  Dyslipidemia  Adrenal insufficiency (CMS/[facility])  Hypoglycemia  Gastro-esophageal reflux disease without esophagitis  Hypothyroidism, acquired  Seizure disorder (CMS/[facility])  Wernicke's  encephalopathy    Patient has been doing well.  Patient blood sugars have been stable and no recent episodes of hypoglycemia.  All other labs reviewed and have been stable.  Patient has been eating well        Onofre Renteria MD       Electronically Signed By: Onofre Renteria MD   7/27/24  4:43 AM

## 2024-07-16 ENCOUNTER — APPOINTMENT (OUTPATIENT)
Dept: ENDOCRINOLOGY | Facility: HOSPITAL | Age: 59
End: 2024-07-16
Payer: COMMERCIAL

## 2024-07-18 ENCOUNTER — APPOINTMENT (OUTPATIENT)
Dept: ENDOCRINOLOGY | Facility: HOSPITAL | Age: 59
End: 2024-07-18
Payer: COMMERCIAL

## 2024-08-20 NOTE — NURSING NOTE
12:10: Attempted to call and give report to [facility]. No answer from RN. Will re-attempt in 10 minutes.    12:29: Spoke to RN and gave report. Updated RN on expected pickup time.  
Patient confused and unable to take pills rushed in apple sauce. Patient voice is wet and has a weak cough. BG is 77. Dr. Jeter paged. Speech consult entered and NPO diet initiated  
4-10 METS

## 2024-08-21 ENCOUNTER — OFFICE VISIT (OUTPATIENT)
Dept: OTOLARYNGOLOGY | Facility: HOSPITAL | Age: 59
End: 2024-08-21
Payer: COMMERCIAL

## 2024-08-21 VITALS — WEIGHT: 158 LBS | BODY MASS INDEX: 29.08 KG/M2 | TEMPERATURE: 97.7 F | HEIGHT: 62 IN

## 2024-08-21 DIAGNOSIS — R49.0 DYSPHONIA: Primary | ICD-10-CM

## 2024-08-21 DIAGNOSIS — G40.909 SEIZURE DISORDER (MULTI): ICD-10-CM

## 2024-08-21 DIAGNOSIS — E51.2 WERNICKE'S ENCEPHALOPATHY: ICD-10-CM

## 2024-08-21 PROCEDURE — 3008F BODY MASS INDEX DOCD: CPT | Performed by: OTOLARYNGOLOGY

## 2024-08-21 PROCEDURE — 99213 OFFICE O/P EST LOW 20 MIN: CPT | Performed by: OTOLARYNGOLOGY

## 2024-08-21 PROCEDURE — 99203 OFFICE O/P NEW LOW 30 MIN: CPT | Performed by: OTOLARYNGOLOGY

## 2024-08-21 ASSESSMENT — PATIENT HEALTH QUESTIONNAIRE - PHQ9
1. LITTLE INTEREST OR PLEASURE IN DOING THINGS: NOT AT ALL
2. FEELING DOWN, DEPRESSED OR HOPELESS: NOT AT ALL
SUM OF ALL RESPONSES TO PHQ9 QUESTIONS 1 & 2: 0

## 2024-08-21 NOTE — PROGRESS NOTES
Patient: Ileana White   MRN: [id number] YOB: 1965   Sex: female Age: 59 y.o.  Date of Service: 2024       ASSESSMENT AND PLAN  I discussed the findings with Ileana White and have recommended the followin. Dysphonia, perceptually her voice is soft and speech is somewhat hypernasal. Oral-motor exam otherwise unremarkable. She declined laryngoscopy today as this was a scope that needed to be placed through her nose  - Follow-up as needed when patient is willing to undergo laryngoscopy  - Modified barium swallow study if patient has swallowing trouble      CHIEF COMPLAINT  Unknown      HISTORY OF PRESENT ILLNESS  Ileana White is a 59 y.o. female referred for evaluation of voice and swallowing.  The patient has a history of cognitive impairment, adrenal insufficiency, hypothyroidism. She lives in a nursing home. She is not sure why she is here today. Here with a nursing aid today who also has no information. Denies voice, breathing, or swallowing trouble. She denies history of stroke.      ADDITIONAL HISTORY  Past Medical History  She has a past medical history of Adrenal insufficiency (Is[place]'s disease) (Multi), Dyslipidemia, GERD without esophagitis, Heart disease, Hypertension, Hypothyroidism, and Wernicke's encephalopathy. Surgical History  She has a past surgical history that includes Mass excision.   Social History  She reports that she quit smoking about 3 weeks ago. Her smoking use included cigarettes. She has never used smokeless tobacco. She reports that she does not currently use alcohol. She reports that she does not currently use drugs. Allergies  Morphine and Egg     Family History  No family history on file.     REVIEW OF SYSTEMS  All 10 systems were reviewed and negative except for above.      PHYSICAL EXAM  ENT Physical Exam   GENERAL: Well-nourished, no distress, voice hypernasal, [id number]  RESPIRATORY: Breathing quietly, no stridor  HEAD: Normocephalic atraumatic  FACE:  "Symmetric, no masses or lesions  EYES:  Pupils reactive, sclera clear, external ocular muscles intact, no nystagmus.    EARS:  Pinnae normal. External auditory canals clear and tympanic membranes intact.  NOSE:  No anterior lesions, masses or polyps.  ORAL CAVITY/OROPHARYNX:  Buccal mucosa is moist without lesions or masses, tongue midline and palate elevates symmetrically. Tongue mobility intact.  NECK:  Soft. There is no lymphadenopathy or thyromegaly.    NEUROLOGIC:  Cranial nerves II-XII grossly intact.       Last Recorded Vitals  Temperature 36.5 °C (97.7 °F), height 1.575 m (5' 2\"), weight 71.7 kg (158 lb).    RESULTS    Patient Reported Outcome Measures  N/A    Laboratory, Radiology, and Pathology  I personally reviewed the following results, with the following interpretation:   N/A      PROCEDURES  Patient declined laryngoscopy     ----------------------------------------------------------------------  Mitzi Alston MD, MAEd    [facility]  Department of Otolaryngology - Head and Neck Surgery  [facility]    The total time I spent in care of this patient today (excluding time spent on other billable services) is as follows:    Time Spent  Prep time on day of patient encounter: 5 minutes  Time spent directly with patient, family or caregiver: 15 minutes  Additional Time Spent on Patient Care Activities: 5 minutes  Documentation Time: 5 minutes  Other Time Spent: 0 minutes  Total: 30 minutes        "

## 2024-09-23 ENCOUNTER — APPOINTMENT (OUTPATIENT)
Dept: ENDOCRINOLOGY | Facility: CLINIC | Age: 59
End: 2024-09-23
Payer: COMMERCIAL

## 2024-10-29 ENCOUNTER — APPOINTMENT (OUTPATIENT)
Dept: ENDOCRINOLOGY | Facility: CLINIC | Age: 59
End: 2024-10-29
Payer: COMMERCIAL

## 2024-11-17 ENCOUNTER — HOSPITAL ENCOUNTER (EMERGENCY)
Facility: HOSPITAL | Age: 59
Discharge: HOME | End: 2024-11-17
Attending: EMERGENCY MEDICINE
Payer: COMMERCIAL

## 2024-11-17 ENCOUNTER — APPOINTMENT (OUTPATIENT)
Dept: CARDIOLOGY | Facility: HOSPITAL | Age: 59
End: 2024-11-17
Payer: COMMERCIAL

## 2024-11-17 VITALS
WEIGHT: 169.53 LBS | BODY MASS INDEX: 30.04 KG/M2 | RESPIRATION RATE: 18 BRPM | SYSTOLIC BLOOD PRESSURE: 99 MMHG | TEMPERATURE: 96.6 F | HEART RATE: 86 BPM | HEIGHT: 63 IN | OXYGEN SATURATION: 94 % | DIASTOLIC BLOOD PRESSURE: 76 MMHG

## 2024-11-17 DIAGNOSIS — R00.0 SINUS TACHYCARDIA: ICD-10-CM

## 2024-11-17 DIAGNOSIS — E87.6 HYPOKALEMIA: Primary | ICD-10-CM

## 2024-11-17 DIAGNOSIS — N30.90 CYSTITIS: ICD-10-CM

## 2024-11-17 LAB
ANION GAP SERPL CALCULATED.3IONS-SCNC: 14 MMOL/L (ref 10–20)
APPEARANCE UR: ABNORMAL
BACTERIA #/AREA URNS AUTO: ABNORMAL /HPF
BASOPHILS # BLD AUTO: 0.1 X10*3/UL (ref 0–0.1)
BASOPHILS NFR BLD AUTO: 1.7 %
BILIRUB UR STRIP.AUTO-MCNC: NEGATIVE MG/DL
BUN SERPL-MCNC: 15 MG/DL (ref 6–23)
CALCIUM SERPL-MCNC: 9.3 MG/DL (ref 8.6–10.3)
CHLORIDE SERPL-SCNC: 107 MMOL/L (ref 98–107)
CO2 SERPL-SCNC: 19 MMOL/L (ref 21–32)
COLOR UR: YELLOW
CREAT SERPL-MCNC: 1.12 MG/DL (ref 0.5–1.05)
EGFRCR SERPLBLD CKD-EPI 2021: 57 ML/MIN/1.73M*2
EOSINOPHIL # BLD AUTO: 0.29 X10*3/UL (ref 0–0.7)
EOSINOPHIL NFR BLD AUTO: 4.9 %
ERYTHROCYTE [DISTWIDTH] IN BLOOD BY AUTOMATED COUNT: 12.6 % (ref 11.5–14.5)
GLUCOSE SERPL-MCNC: 154 MG/DL (ref 74–99)
GLUCOSE UR STRIP.AUTO-MCNC: NORMAL MG/DL
HCT VFR BLD AUTO: 45.8 % (ref 36–46)
HGB BLD-MCNC: 14.8 G/DL (ref 12–16)
HYALINE CASTS #/AREA URNS AUTO: ABNORMAL /LPF
IMM GRANULOCYTES # BLD AUTO: 0.02 X10*3/UL (ref 0–0.7)
IMM GRANULOCYTES NFR BLD AUTO: 0.3 % (ref 0–0.9)
KETONES UR STRIP.AUTO-MCNC: NEGATIVE MG/DL
LEUKOCYTE ESTERASE UR QL STRIP.AUTO: ABNORMAL
LYMPHOCYTES # BLD AUTO: 2.75 X10*3/UL (ref 1.2–4.8)
LYMPHOCYTES NFR BLD AUTO: 46.8 %
MAGNESIUM SERPL-MCNC: 1.96 MG/DL (ref 1.6–2.4)
MCH RBC QN AUTO: 29 PG (ref 26–34)
MCHC RBC AUTO-ENTMCNC: 32.3 G/DL (ref 32–36)
MCV RBC AUTO: 90 FL (ref 80–100)
MONOCYTES # BLD AUTO: 0.46 X10*3/UL (ref 0.1–1)
MONOCYTES NFR BLD AUTO: 7.8 %
MUCOUS THREADS #/AREA URNS AUTO: ABNORMAL /LPF
NEUTROPHILS # BLD AUTO: 2.26 X10*3/UL (ref 1.2–7.7)
NEUTROPHILS NFR BLD AUTO: 38.5 %
NITRITE UR QL STRIP.AUTO: NEGATIVE
NRBC BLD-RTO: 0 /100 WBCS (ref 0–0)
PH UR STRIP.AUTO: 5.5 [PH]
PLATELET # BLD AUTO: 251 X10*3/UL (ref 150–450)
POTASSIUM SERPL-SCNC: 3.3 MMOL/L (ref 3.5–5.3)
PROT UR STRIP.AUTO-MCNC: ABNORMAL MG/DL
RBC # BLD AUTO: 5.1 X10*6/UL (ref 4–5.2)
RBC # UR STRIP.AUTO: ABNORMAL /UL
RBC #/AREA URNS AUTO: ABNORMAL /HPF
SODIUM SERPL-SCNC: 137 MMOL/L (ref 136–145)
SP GR UR STRIP.AUTO: 1.02
SQUAMOUS #/AREA URNS AUTO: ABNORMAL /HPF
T4 FREE SERPL-MCNC: 1.01 NG/DL (ref 0.61–1.12)
TSH SERPL-ACNC: <0.01 MIU/L (ref 0.44–3.98)
UROBILINOGEN UR STRIP.AUTO-MCNC: NORMAL MG/DL
WBC # BLD AUTO: 5.9 X10*3/UL (ref 4.4–11.3)
WBC #/AREA URNS AUTO: >50 /HPF
WBC CLUMPS #/AREA URNS AUTO: ABNORMAL /HPF

## 2024-11-17 PROCEDURE — 2500000002 HC RX 250 W HCPCS SELF ADMINISTERED DRUGS (ALT 637 FOR MEDICARE OP, ALT 636 FOR OP/ED): Performed by: EMERGENCY MEDICINE

## 2024-11-17 PROCEDURE — 84439 ASSAY OF FREE THYROXINE: CPT | Performed by: EMERGENCY MEDICINE

## 2024-11-17 PROCEDURE — 81001 URINALYSIS AUTO W/SCOPE: CPT | Performed by: EMERGENCY MEDICINE

## 2024-11-17 PROCEDURE — 85025 COMPLETE CBC W/AUTO DIFF WBC: CPT | Performed by: EMERGENCY MEDICINE

## 2024-11-17 PROCEDURE — 84443 ASSAY THYROID STIM HORMONE: CPT | Performed by: EMERGENCY MEDICINE

## 2024-11-17 PROCEDURE — 2500000001 HC RX 250 WO HCPCS SELF ADMINISTERED DRUGS (ALT 637 FOR MEDICARE OP): Performed by: EMERGENCY MEDICINE

## 2024-11-17 PROCEDURE — 99283 EMERGENCY DEPT VISIT LOW MDM: CPT

## 2024-11-17 PROCEDURE — 84132 ASSAY OF SERUM POTASSIUM: CPT | Performed by: EMERGENCY MEDICINE

## 2024-11-17 PROCEDURE — 36415 COLL VENOUS BLD VENIPUNCTURE: CPT | Performed by: EMERGENCY MEDICINE

## 2024-11-17 PROCEDURE — 87086 URINE CULTURE/COLONY COUNT: CPT | Mod: TRILAB | Performed by: EMERGENCY MEDICINE

## 2024-11-17 PROCEDURE — 93005 ELECTROCARDIOGRAM TRACING: CPT

## 2024-11-17 PROCEDURE — 83735 ASSAY OF MAGNESIUM: CPT | Performed by: EMERGENCY MEDICINE

## 2024-11-17 RX ORDER — NITROFURANTOIN 25; 75 MG/1; MG/1
100 CAPSULE ORAL 2 TIMES DAILY
Qty: 10 CAPSULE | Refills: 0 | Status: SHIPPED | OUTPATIENT
Start: 2024-11-17 | End: 2024-11-22

## 2024-11-17 RX ORDER — POTASSIUM CHLORIDE 1.5 G/1.58G
20 POWDER, FOR SOLUTION ORAL DAILY
Qty: 10 PACKET | Refills: 0 | Status: SHIPPED | OUTPATIENT
Start: 2024-11-17 | End: 2024-11-27

## 2024-11-17 RX ORDER — METOPROLOL TARTRATE 25 MG/1
25 TABLET, FILM COATED ORAL ONCE
Status: COMPLETED | OUTPATIENT
Start: 2024-11-17 | End: 2024-11-17

## 2024-11-17 RX ORDER — NITROFURANTOIN 25; 75 MG/1; MG/1
100 CAPSULE ORAL ONCE
Status: COMPLETED | OUTPATIENT
Start: 2024-11-17 | End: 2024-11-17

## 2024-11-17 RX ORDER — POTASSIUM CHLORIDE 20 MEQ/1
40 TABLET, EXTENDED RELEASE ORAL ONCE
Status: COMPLETED | OUTPATIENT
Start: 2024-11-17 | End: 2024-11-17

## 2024-11-17 ASSESSMENT — PAIN - FUNCTIONAL ASSESSMENT: PAIN_FUNCTIONAL_ASSESSMENT: 0-10

## 2024-11-17 ASSESSMENT — PAIN SCALES - GENERAL
PAINLEVEL_OUTOF10: 0 - NO PAIN
PAINLEVEL_OUTOF10: 0 - NO PAIN

## 2024-11-17 ASSESSMENT — PAIN DESCRIPTION - PROGRESSION: CLINICAL_PROGRESSION: NOT CHANGED

## 2024-11-17 ASSESSMENT — PAIN DESCRIPTION - PAIN TYPE: TYPE: ACUTE PAIN

## 2024-11-17 NOTE — DISCHARGE INSTRUCTIONS
You have been diagnosed with a urinary tract infection.  Take antibiotics as prescribed until gone.  Return immediately if you develop fever, worsening pain in your flank/side or abdomen.    Your potassium was mildly low today at 3.3 begin taking potassium supplement and have repeat testing in 1 week.

## 2024-11-17 NOTE — ED PROVIDER NOTES
HPI   Chief Complaint   Patient presents with    Rapid Heart Rate     Pt has hx of tachycardia presents today with tachycardia. Pt reports no chest pain.       59-year-old female presents from skilled nursing facility for elevated heart rate.  She does have multiple comorbidities including history of tachycardia as well as Wernicke's encephalopathy with Korsakoff syndrome which is the reason she is currently residing in a skilled nursing facility.  Patient unable to provide any significant reliable history.  She does answer yes and no as well as some minimal questions but is a relatively poor historian.  History provided from EMS that at facility this morning her vitals were noted to be with a heart rate of 132 so they sent her here for assessment.  She does take metoprolol but this was not given to her this morning.  She denies any chest pain, shortness of breath or feeling palpitations.  Denies any specific ill symptoms states that she feels in her usual state of health.      History provided by:  Medical records, patient and EMS personnel  History limited by:  Dementia          Patient History   Past Medical History:   Diagnosis Date    Adrenal insufficiency ([place]'s disease) (Multi)     Dyslipidemia     GERD without esophagitis     Heart disease     Hypertension     Hypothyroidism     Wernicke's encephalopathy      Past Surgical History:   Procedure Laterality Date    MASS EXCISION      Pituitary     No family history on file.  Social History     Tobacco Use    Smoking status: Former     Current packs/day: 0.00     Types: Cigarettes     Quit date: 2024     Years since quittin.3    Smokeless tobacco: Never   Substance Use Topics    Alcohol use: Not Currently    Drug use: Not Currently       Physical Exam   ED Triage Vitals [24 0556]   Temperature Heart Rate Respirations BP   35.9 °C (96.6 °F) (!) 127 18 116/87      Pulse Ox Temp Source Heart Rate Source Patient Position   97 % Temporal Monitor --       BP Location FiO2 (%)     Right arm --       Physical Exam  Vitals and nursing note reviewed.     General: Vitals reviewed. Awake, alert, well-developed, well-nourished, NAD chronically ill-appearing  HEENT: NC/AT, PERRL, MMM  Neck: Supple, trachea midline  Respiratory: No respiratory distress, lungs clear to auscultation bilaterally, no wheezes, rhonchi, or rales  CV: Tachycardic rate and regular rhythm, no murmur/gallop/rubs  Abdomen/GI: Soft, non-tender, non-distended, no rebound, guarding, or rigidity, normal bowel sounds  Extremities: Moving all extremities, no deformities  Neuro: A/O, moving all extremities  Skin: Warm, dry. No rashes identified      ED Course & MDM   ED Course as of 11/17/24 0826   Sun Nov 17, 2024   0602 EKG on my independent interpretation: Sinus tachycardia 117 bpm, normal axis, normal intervals, nonspecific diffuse ST/T wave abnormality similar compared to prior EKG 6/5/2023 [VALERIA]      ED Course User Index  [VALERIA] Marly Ann MD         Diagnoses as of 11/17/24 0826   Hypokalemia   Sinus tachycardia   Cystitis                 No data recorded     Crawford Coma Scale Score: 15 (11/17/24 0710 : Gladis Wesley RN)                           Medical Decision Making  59-year-old female presents for tachycardia noted upon checking her vital signs at skilled nursing facility.  Patient is asymptomatic.  Heart rate in the 120s on arrival but otherwise physical exam is benign aside from her chronic metabolic encephalopathy.  Consider underlying infection, metabolic abnormality among others.  She does have a baseline history of tachycardia on metoprolol which was not taken today therefore we will give dose of her metoprolol as well.  EKG without arrhythmia this does appear to be sinus tachycardia with no acute change compared to prior EKG aside from the rate.  Labs without significant abnormality aside from mildly low potassium 3.3 which per chart review patient has had in the past will start  oral supplementation.  Urinalysis positive for UTI which could be contributory but heart rate did improve no evidence of sepsis at this time.  TSH low but free thyroxine normal.  At this time do feel appropriate for discharge back to skilled nursing facility with short course of potassium supplementation and course of Macrobid for UTI.  Return precautions discussed.    Amount and/or Complexity of Data Reviewed  Independent Historian: EMS  External Data Reviewed: notes.     Details: Notes and med list reviewed from skilled nursing facility from today 11/17/2024 8/22/24 outpatient psychiatry note reviewed  Labs: ordered. Decision-making details documented in ED Course.  ECG/medicine tests: ordered and independent interpretation performed. Decision-making details documented in ED Course.        Procedure  Procedures     Marly Ann MD  11/17/24 9833

## 2024-11-19 LAB
ATRIAL RATE: 118 BPM
BACTERIA UR CULT: ABNORMAL
P AXIS: 48 DEGREES
P OFFSET: 180 MS
P ONSET: 139 MS
PR INTERVAL: 144 MS
Q ONSET: 211 MS
QRS COUNT: 19 BEATS
QRS DURATION: 82 MS
QT INTERVAL: 336 MS
QTC CALCULATION(BAZETT): 470 MS
QTC FREDERICIA: 421 MS
R AXIS: 26 DEGREES
T AXIS: 21 DEGREES
T OFFSET: 379 MS
VENTRICULAR RATE: 118 BPM

## 2024-11-20 ENCOUNTER — TELEPHONE (OUTPATIENT)
Dept: PHARMACY | Facility: HOSPITAL | Age: 59
End: 2024-11-20
Payer: COMMERCIAL

## 2024-11-20 NOTE — PROGRESS NOTES
EDPD Note: Lab/Chart Reviewed    Reviewed Mr./Mrs./Ms. Ileana White 's chart regarding a positive urine culture/result that was taken during their recent emergency room visit. The patient was transferred back to their rehab/LTC facility .Therefore, [facility] fax number [phone] .    Susceptibility data from last 90 days.  Collected Specimen Info Organism Amoxicillin/Clavulanate Ampicillin Ampicillin/Sulbactam Cefazolin Cefazolin (uncomplicated UTIs only) Ciprofloxacin Gentamicin Nitrofurantoin Piperacillin/Tazobactam Tobramycin   11/17/24 Urine from Clean Catch/Voided Escherichia coli  S  R  I  S  S  S  R  S  S  I     Collected Specimen Info Organism Trimethoprim/Sulfamethoxazole   11/17/24 Urine from Clean Catch/Voided Escherichia coli  R       No further follow up needed from EDPD Team.     Cheyenne Paez, PharmD

## 2024-11-24 ENCOUNTER — HOSPITAL ENCOUNTER (EMERGENCY)
Facility: HOSPITAL | Age: 59
Discharge: HOME | End: 2024-11-24
Attending: STUDENT IN AN ORGANIZED HEALTH CARE EDUCATION/TRAINING PROGRAM
Payer: COMMERCIAL

## 2024-11-24 ENCOUNTER — APPOINTMENT (OUTPATIENT)
Dept: RADIOLOGY | Facility: HOSPITAL | Age: 59
End: 2024-11-24
Payer: COMMERCIAL

## 2024-11-24 VITALS
TEMPERATURE: 98.2 F | HEART RATE: 90 BPM | HEIGHT: 64 IN | BODY MASS INDEX: 30.73 KG/M2 | OXYGEN SATURATION: 94 % | RESPIRATION RATE: 19 BRPM | DIASTOLIC BLOOD PRESSURE: 86 MMHG | SYSTOLIC BLOOD PRESSURE: 123 MMHG | WEIGHT: 180 LBS

## 2024-11-24 DIAGNOSIS — R07.9 ACUTE CHEST PAIN: Primary | ICD-10-CM

## 2024-11-24 LAB
ALBUMIN SERPL BCP-MCNC: 3.8 G/DL (ref 3.4–5)
ALP SERPL-CCNC: 95 U/L (ref 33–110)
ALT SERPL W P-5'-P-CCNC: 8 U/L (ref 7–45)
ANION GAP SERPL CALCULATED.3IONS-SCNC: 19 MMOL/L (ref 10–20)
AST SERPL W P-5'-P-CCNC: 21 U/L (ref 9–39)
BASOPHILS # BLD AUTO: 0.08 X10*3/UL (ref 0–0.1)
BASOPHILS NFR BLD AUTO: 1.5 %
BILIRUB SERPL-MCNC: 0.7 MG/DL (ref 0–1.2)
BUN SERPL-MCNC: 11 MG/DL (ref 6–23)
CALCIUM SERPL-MCNC: 9.1 MG/DL (ref 8.6–10.3)
CARDIAC TROPONIN I PNL SERPL HS: 5 NG/L (ref 0–13)
CARDIAC TROPONIN I PNL SERPL HS: 5 NG/L (ref 0–13)
CHLORIDE SERPL-SCNC: 101 MMOL/L (ref 98–107)
CO2 SERPL-SCNC: 19 MMOL/L (ref 21–32)
CREAT SERPL-MCNC: 0.78 MG/DL (ref 0.5–1.05)
EGFRCR SERPLBLD CKD-EPI 2021: 88 ML/MIN/1.73M*2
EOSINOPHIL # BLD AUTO: 0.23 X10*3/UL (ref 0–0.7)
EOSINOPHIL NFR BLD AUTO: 4.4 %
ERYTHROCYTE [DISTWIDTH] IN BLOOD BY AUTOMATED COUNT: 13 % (ref 11.5–14.5)
GLUCOSE SERPL-MCNC: 79 MG/DL (ref 74–99)
HCT VFR BLD AUTO: 43.5 % (ref 36–46)
HGB BLD-MCNC: 14.3 G/DL (ref 12–16)
IMM GRANULOCYTES # BLD AUTO: 0.01 X10*3/UL (ref 0–0.7)
IMM GRANULOCYTES NFR BLD AUTO: 0.2 % (ref 0–0.9)
LYMPHOCYTES # BLD AUTO: 2.45 X10*3/UL (ref 1.2–4.8)
LYMPHOCYTES NFR BLD AUTO: 46.5 %
MCH RBC QN AUTO: 29.1 PG (ref 26–34)
MCHC RBC AUTO-ENTMCNC: 32.9 G/DL (ref 32–36)
MCV RBC AUTO: 89 FL (ref 80–100)
MONOCYTES # BLD AUTO: 0.42 X10*3/UL (ref 0.1–1)
MONOCYTES NFR BLD AUTO: 8 %
NEUTROPHILS # BLD AUTO: 2.08 X10*3/UL (ref 1.2–7.7)
NEUTROPHILS NFR BLD AUTO: 39.4 %
NRBC BLD-RTO: 0 /100 WBCS (ref 0–0)
PLATELET # BLD AUTO: 247 X10*3/UL (ref 150–450)
POTASSIUM SERPL-SCNC: 3.6 MMOL/L (ref 3.5–5.3)
PROT SERPL-MCNC: 6.8 G/DL (ref 6.4–8.2)
RBC # BLD AUTO: 4.91 X10*6/UL (ref 4–5.2)
SODIUM SERPL-SCNC: 135 MMOL/L (ref 136–145)
WBC # BLD AUTO: 5.3 X10*3/UL (ref 4.4–11.3)

## 2024-11-24 PROCEDURE — 80053 COMPREHEN METABOLIC PANEL: CPT | Performed by: STUDENT IN AN ORGANIZED HEALTH CARE EDUCATION/TRAINING PROGRAM

## 2024-11-24 PROCEDURE — 71045 X-RAY EXAM CHEST 1 VIEW: CPT | Performed by: RADIOLOGY

## 2024-11-24 PROCEDURE — 96374 THER/PROPH/DIAG INJ IV PUSH: CPT

## 2024-11-24 PROCEDURE — 36415 COLL VENOUS BLD VENIPUNCTURE: CPT | Performed by: STUDENT IN AN ORGANIZED HEALTH CARE EDUCATION/TRAINING PROGRAM

## 2024-11-24 PROCEDURE — 2500000004 HC RX 250 GENERAL PHARMACY W/ HCPCS (ALT 636 FOR OP/ED): Performed by: STUDENT IN AN ORGANIZED HEALTH CARE EDUCATION/TRAINING PROGRAM

## 2024-11-24 PROCEDURE — 84484 ASSAY OF TROPONIN QUANT: CPT | Performed by: STUDENT IN AN ORGANIZED HEALTH CARE EDUCATION/TRAINING PROGRAM

## 2024-11-24 PROCEDURE — 85025 COMPLETE CBC W/AUTO DIFF WBC: CPT | Performed by: STUDENT IN AN ORGANIZED HEALTH CARE EDUCATION/TRAINING PROGRAM

## 2024-11-24 PROCEDURE — 99284 EMERGENCY DEPT VISIT MOD MDM: CPT | Mod: 25

## 2024-11-24 PROCEDURE — 71045 X-RAY EXAM CHEST 1 VIEW: CPT

## 2024-11-24 RX ORDER — PANTOPRAZOLE SODIUM 40 MG/10ML
40 INJECTION, POWDER, LYOPHILIZED, FOR SOLUTION INTRAVENOUS DAILY
Status: DISCONTINUED | OUTPATIENT
Start: 2024-11-24 | End: 2024-11-24 | Stop reason: HOSPADM

## 2024-11-24 ASSESSMENT — PAIN DESCRIPTION - PROGRESSION: CLINICAL_PROGRESSION: NOT CHANGED

## 2024-11-24 ASSESSMENT — PAIN SCALES - GENERAL
PAINLEVEL_OUTOF10: 10 - WORST POSSIBLE PAIN
PAINLEVEL_OUTOF10: 9

## 2024-11-24 ASSESSMENT — PAIN DESCRIPTION - DESCRIPTORS: DESCRIPTORS: PRESSURE

## 2024-11-24 ASSESSMENT — HEART SCORE
AGE: 45-64
HISTORY: SLIGHTLY SUSPICIOUS
TROPONIN: LESS THAN OR EQUAL TO NORMAL LIMIT
HEART SCORE: 2
RISK FACTORS: 1-2 RISK FACTORS
ECG: NORMAL

## 2024-11-24 ASSESSMENT — COLUMBIA-SUICIDE SEVERITY RATING SCALE - C-SSRS
1. IN THE PAST MONTH, HAVE YOU WISHED YOU WERE DEAD OR WISHED YOU COULD GO TO SLEEP AND NOT WAKE UP?: NO
2. HAVE YOU ACTUALLY HAD ANY THOUGHTS OF KILLING YOURSELF?: NO
6. HAVE YOU EVER DONE ANYTHING, STARTED TO DO ANYTHING, OR PREPARED TO DO ANYTHING TO END YOUR LIFE?: NO

## 2024-11-24 ASSESSMENT — PAIN DESCRIPTION - FREQUENCY: FREQUENCY: CONSTANT/CONTINUOUS

## 2024-11-24 ASSESSMENT — PAIN - FUNCTIONAL ASSESSMENT: PAIN_FUNCTIONAL_ASSESSMENT: 0-10

## 2024-11-24 ASSESSMENT — PAIN DESCRIPTION - ONSET: ONSET: AWAKENED FROM SLEEP

## 2024-11-24 ASSESSMENT — PAIN DESCRIPTION - PAIN TYPE: TYPE: ACUTE PAIN

## 2024-11-24 ASSESSMENT — PAIN DESCRIPTION - LOCATION: LOCATION: CHEST

## 2024-11-24 NOTE — DISCHARGE INSTRUCTIONS
You are seen in the emergency department today for chest pain.  At this time you do not need to stay in the hospital.  Please continue to take your medications as prescribed.  If you have any other concerns, you can return to the emergency department for reevaluation.

## 2024-11-24 NOTE — ED PROVIDER NOTES
History of Present Illness     History provided by: Patient and EMS  Limitations to History: None  External Records Reviewed with Brief Summary: None    HPI:  Ileana White is a 59 y.o. female presents with chest pain.  Patient has a history of gastric reflux and missed her morning medications.  Patient describes a burning pain in her middle chest, nonradiating, no epigastric tenderness.  No nausea or vomiting.    Physical Exam   Triage vitals:  T 36.8 °C (98.2 °F)  HR 85  /90  RR 18  O2 95 % None (Room air)    Physical Exam  Vitals and nursing note reviewed.   Constitutional:       General: She is not in acute distress.     Appearance: She is not ill-appearing.   HENT:      Head: Normocephalic and atraumatic.      Mouth/Throat:      Mouth: Mucous membranes are moist.      Pharynx: Oropharynx is clear.   Eyes:      Extraocular Movements: Extraocular movements intact.      Conjunctiva/sclera: Conjunctivae normal.      Pupils: Pupils are equal, round, and reactive to light.   Cardiovascular:      Rate and Rhythm: Normal rate and regular rhythm.   Pulmonary:      Effort: Pulmonary effort is normal. No respiratory distress.      Breath sounds: Normal breath sounds.   Abdominal:      General: There is no distension.      Palpations: Abdomen is soft.      Tenderness: There is no abdominal tenderness.   Musculoskeletal:         General: No swelling or deformity. Normal range of motion.      Cervical back: Normal range of motion and neck supple.      Right lower leg: No edema.      Left lower leg: No edema.   Skin:     General: Skin is warm and dry.      Capillary Refill: Capillary refill takes less than 2 seconds.   Neurological:      General: No focal deficit present.      Mental Status: She is alert and oriented to person, place, and time. Mental status is at baseline.   Psychiatric:         Mood and Affect: Mood normal.         Behavior: Behavior normal.          Medical Decision Making & ED Course   Medical  Decision Makin y.o. female who presents to the ED with chest pain.  Considered wide ddx for pt's presentation, including aortic dissection, pneumothorax, pneumonia, pleurisy, COPD/asthma exacerbation, pleural effusion, CHF, pericarditis, myocarditis, endocarditis, pericardial effusion/tamponade, musculoskeletal chest pain. Doubt aortic dissection given no widened mediastinum, apical cap also equal pulses and no tearing chest pain.  No STEMI on initial EKG.   Troponin x2 negative, EKG with no ischemic changes.      HEART SCORE: 2     The patient responded to treatment with improvement in symptoms.  At this time is stable, with normal/improved vitals.  Patient given home dose of Protonix.  Patient is stable for discharge back to her facility with return precautions given.    ----    Social Determinants of Health which Significantly Impact Care: None identified     EKG Independent Interpretation: EKG interpreted by myself. Please see ED Course for full interpretation.    Independent Result Review and Interpretation: Relevant laboratory and radiographic results were reviewed and independently interpreted by myself.  As necessary, they are commented on in the ED Course.    Chronic conditions affecting the patient's care: As documented above in [facility]    The patient was discussed with the following consultants/services: None    Care Considerations: As documented above in [facility]    ED Course:  ED Course as of 24 180   Sun 2024   1232 Performed at  1230, HR of 90, NSR, NAD, QTc 479, no sign of STEMI, no Q wave or T wave abnormality noted.    Reviewed and interpreted by me at time performed   [JM]      ED Course User Index  [JM] Dayanara Finley MD         Diagnoses as of 24 180   Acute chest pain       Procedures   Procedures    Dayanara Finley MD  Emergency Medicine     Dayanara Finley MD  24

## 2024-12-11 ENCOUNTER — NURSING HOME VISIT (OUTPATIENT)
Dept: POST ACUTE CARE | Facility: EXTERNAL LOCATION | Age: 59
End: 2024-12-11
Payer: COMMERCIAL

## 2024-12-11 DIAGNOSIS — E16.2 HYPOGLYCEMIA: ICD-10-CM

## 2024-12-11 DIAGNOSIS — K21.9 GASTROESOPHAGEAL REFLUX DISEASE WITHOUT ESOPHAGITIS: Primary | ICD-10-CM

## 2024-12-11 DIAGNOSIS — E03.9 HYPOTHYROIDISM, ACQUIRED: ICD-10-CM

## 2024-12-11 DIAGNOSIS — G40.909 SEIZURE DISORDER (MULTI): ICD-10-CM

## 2024-12-11 DIAGNOSIS — E27.40 ADRENAL INSUFFICIENCY (MULTI): ICD-10-CM

## 2024-12-11 DIAGNOSIS — E51.2 WERNICKE'S ENCEPHALOPATHY: ICD-10-CM

## 2024-12-11 PROCEDURE — 99310 SBSQ NF CARE HIGH MDM 45: CPT | Performed by: INTERNAL MEDICINE

## 2024-12-11 NOTE — LETTER
Patient: Ileana White  : 1965    Encounter Date: 2024                    Nursing home visit note     Subjective   Patient ID: Ileana White is a 59 y.o. female who is long term resident being seen and evaluated for multiple medical problems.    Patient is a 58-year-old female with history of cognitive impairment, adrenal insufficiency, hypothyroidism,  is being seen for folow up at the nursing home.  Patient was seen in the ER about 2 weeks ago with complaint of chest pain.  Patient was negative for any acute coronary syndrome.  Patient has been complaining of some reflux symptoms.  Patient has not been eating well and has been having some nausea and vomiting.  Patient is being started on Zofran as well as Maalox.  Patient is on potassium and  hydroxychloroquine.  Patient's recent labs reviewed and has been stable.  Advance directives discussed with the patient.  She would like to be DNR CCA.  Does not want to be sent out to hospital and does not want intubation.  Patient does not have anybody else to make decisions for her.  She is competent enough to make decisions at this point of time.    Review of Systems  Negative for fever or chills  Negative for sore throat, ear pain, nasal discharge  Negative for cough, shortness of breath or wheezing  Negative for chest pain, palpitations, swelling of legs  Positive for abdominal pain, nausea and mild retching.  No constipation or diarrhea no blood in the stools  Negative for urinary complaints  Negative for headache, dizziness, weakness or numbness  Negative for joint pain  Positive for confusion  All other systems reviewed and were negative   Objective   Vital signs are stable  Vitals:    24 1009   BP: 124/68   Pulse: 74   Temp: 37 °C (98.6 °F)      Physical Exam  Constitutional: Patient does not appear to be in any acute distress  HEENT.  Patient has bruise over her left eye.  Vision is normal   cardiovascular: RRR, S1/S2, no murmurs, rubs, or gallops,  no edema of extremities  Pulmonary: CTAB, no respiratory distress.  Abdomen: No significant tenderness of the abdominal no palpable mass bowel sounds are present.  MSK: No joint swelling, normal movements of all extremities. Range of motion- normal.  Skin- No lesions, contusions, or erythema.  Peripheral puslses palpable bilaterally 2+  Neuro: AAO X1, Cranial nerves 2-12 grossly intact,DTR 2+ in all 4 limbs   Psychiatric: Judgment intact. Appropriate mood and behavior   Assessment/Plan   Diagnoses and all orders for this visit:  Dyslipidemia  Adrenal insufficiency (CMS/HCC)  Hypoglycemia  Gastro-esophageal reflux disease without esophagitis  Hypothyroidism, acquired  Seizure disorder (CMS/HCC)  Wernicke's encephalopathy    Patient is being seen for follow-up at the nursing home.  The ER records reviewed.  Patient has not been eating over the last few days and refusing all her medications.  Advance directives discussed with the patient she would like to be DNR CCA.  Patient also is being started on Zofran and Maalox for reflux esophagitis.  Patient has been on potassium and hydroxychloroquine which will be discontinued.  Patient will continue with prednisone for adrenal insufficiency.  Will continue to monitor for now lab work is stable.  If patient's condition continues to deteriorate patient is willing to be started on hospice.      Onofre Renteria MD       Electronically Signed By: Onofre Renteria MD   12/12/24 10:14 AM

## 2024-12-12 VITALS — HEART RATE: 74 BPM | TEMPERATURE: 98.6 F | DIASTOLIC BLOOD PRESSURE: 68 MMHG | SYSTOLIC BLOOD PRESSURE: 124 MMHG

## 2024-12-12 NOTE — PROGRESS NOTES
Nursing home visit note     Subjective   Patient ID: Ileana White is a 59 y.o. female who is long term resident being seen and evaluated for multiple medical problems.    Patient is a 58-year-old female with history of cognitive impairment, adrenal insufficiency, hypothyroidism,  is being seen for folow up at the nursing home.  Patient was seen in the ER about 2 weeks ago with complaint of chest pain.  Patient was negative for any acute coronary syndrome.  Patient has been complaining of some reflux symptoms.  Patient has not been eating well and has been having some nausea and vomiting.  Patient is being started on Zofran as well as Maalox.  Patient is on potassium and  hydroxychloroquine.  Patient's recent labs reviewed and has been stable.  Advance directives discussed with the patient.  She would like to be DNR CCA.  Does not want to be sent out to hospital and does not want intubation.  Patient does not have anybody else to make decisions for her.  She is competent enough to make decisions at this point of time.    Review of Systems  Negative for fever or chills  Negative for sore throat, ear pain, nasal discharge  Negative for cough, shortness of breath or wheezing  Negative for chest pain, palpitations, swelling of legs  Positive for abdominal pain, nausea and mild retching.  No constipation or diarrhea no blood in the stools  Negative for urinary complaints  Negative for headache, dizziness, weakness or numbness  Negative for joint pain  Positive for confusion  All other systems reviewed and were negative   Objective   Vital signs are stable  Vitals:    12/11/24 1009   BP: 124/68   Pulse: 74   Temp: 37 °C (98.6 °F)      Physical Exam  Constitutional: Patient does not appear to be in any acute distress  HEENT.  Patient has bruise over her left eye.  Vision is normal   cardiovascular: RRR, S1/S2, no murmurs, rubs, or gallops, no edema of extremities  Pulmonary: CTAB, no respiratory  distress.  Abdomen: No significant tenderness of the abdominal no palpable mass bowel sounds are present.  MSK: No joint swelling, normal movements of all extremities. Range of motion- normal.  Skin- No lesions, contusions, or erythema.  Peripheral puslses palpable bilaterally 2+  Neuro: AAO X1, Cranial nerves 2-12 grossly intact,DTR 2+ in all 4 limbs   Psychiatric: Judgment intact. Appropriate mood and behavior   Assessment/Plan   Diagnoses and all orders for this visit:  Dyslipidemia  Adrenal insufficiency (CMS/HCC)  Hypoglycemia  Gastro-esophageal reflux disease without esophagitis  Hypothyroidism, acquired  Seizure disorder (CMS/HCC)  Wernicke's encephalopathy    Patient is being seen for follow-up at the nursing home.  The ER records reviewed.  Patient has not been eating over the last few days and refusing all her medications.  Advance directives discussed with the patient she would like to be DNR CCA.  Patient also is being started on Zofran and Maalox for reflux esophagitis.  Patient has been on potassium and hydroxychloroquine which will be discontinued.  Patient will continue with prednisone for adrenal insufficiency.  Will continue to monitor for now lab work is stable.  If patient's condition continues to deteriorate patient is willing to be started on hospice.      Onofre Renteria MD

## 2025-01-07 ENCOUNTER — APPOINTMENT (OUTPATIENT)
Dept: ENDOCRINOLOGY | Facility: CLINIC | Age: 60
End: 2025-01-07
Payer: COMMERCIAL